# Patient Record
Sex: MALE | Race: WHITE | Employment: OTHER | URBAN - METROPOLITAN AREA
[De-identification: names, ages, dates, MRNs, and addresses within clinical notes are randomized per-mention and may not be internally consistent; named-entity substitution may affect disease eponyms.]

---

## 2017-03-09 ENCOUNTER — APPOINTMENT (OUTPATIENT)
Dept: GENERAL RADIOLOGY | Age: 77
End: 2017-03-09
Attending: EMERGENCY MEDICINE
Payer: MEDICARE

## 2017-03-09 ENCOUNTER — HOSPITAL ENCOUNTER (EMERGENCY)
Age: 77
Discharge: HOME | End: 2017-03-09
Attending: EMERGENCY MEDICINE
Payer: MEDICARE

## 2017-03-09 VITALS
DIASTOLIC BLOOD PRESSURE: 86 MMHG | TEMPERATURE: 98.2 F | HEIGHT: 67 IN | BODY MASS INDEX: 32.08 KG/M2 | SYSTOLIC BLOOD PRESSURE: 156 MMHG | HEART RATE: 68 BPM | OXYGEN SATURATION: 94 % | WEIGHT: 204.4 LBS | RESPIRATION RATE: 20 BRPM

## 2017-03-09 DIAGNOSIS — J98.01 ACUTE BRONCHOSPASM: ICD-10-CM

## 2017-03-09 DIAGNOSIS — R06.6 SINGULTUS: ICD-10-CM

## 2017-03-09 DIAGNOSIS — J10.1 INFLUENZA A: Primary | ICD-10-CM

## 2017-03-09 LAB
ALBUMIN SERPL BCP-MCNC: 3.9 G/DL (ref 3.5–5)
ALBUMIN/GLOB SERPL: 1.3 {RATIO} (ref 1.1–2.2)
ALP SERPL-CCNC: 72 U/L (ref 45–117)
ALT SERPL-CCNC: 43 U/L (ref 12–78)
ANION GAP BLD CALC-SCNC: 5 MMOL/L (ref 5–15)
AST SERPL W P-5'-P-CCNC: 29 U/L (ref 15–37)
BASOPHILS # BLD AUTO: 0 K/UL (ref 0–0.1)
BASOPHILS # BLD: 0 % (ref 0–1)
BILIRUB DIRECT SERPL-MCNC: 0.1 MG/DL (ref 0–0.2)
BILIRUB SERPL-MCNC: 0.4 MG/DL (ref 0.2–1)
BNP SERPL-MCNC: 52 PG/ML (ref 0–100)
BUN SERPL-MCNC: 19 MG/DL (ref 6–20)
BUN/CREAT SERPL: 18 (ref 12–20)
CALCIUM SERPL-MCNC: 8.4 MG/DL (ref 8.5–10.1)
CHLORIDE SERPL-SCNC: 105 MMOL/L (ref 97–108)
CO2 SERPL-SCNC: 27 MMOL/L (ref 21–32)
CREAT SERPL-MCNC: 1.03 MG/DL (ref 0.7–1.3)
EOSINOPHIL # BLD: 0.2 K/UL (ref 0–0.4)
EOSINOPHIL NFR BLD: 3 % (ref 0–7)
ERYTHROCYTE [DISTWIDTH] IN BLOOD BY AUTOMATED COUNT: 13.6 % (ref 11.5–14.5)
FLUAV AG NPH QL IA: POSITIVE
FLUBV AG NOSE QL IA: NEGATIVE
GLOBULIN SER CALC-MCNC: 2.9 G/DL (ref 2–4)
GLUCOSE SERPL-MCNC: 99 MG/DL (ref 65–100)
HCT VFR BLD AUTO: 42.7 % (ref 36.6–50.3)
HGB BLD-MCNC: 14.3 G/DL (ref 12.1–17)
LYMPHOCYTES # BLD AUTO: 24 % (ref 12–49)
LYMPHOCYTES # BLD: 1.3 K/UL (ref 0.8–3.5)
MCH RBC QN AUTO: 30.5 PG (ref 26–34)
MCHC RBC AUTO-ENTMCNC: 33.5 G/DL (ref 30–36.5)
MCV RBC AUTO: 91 FL (ref 80–99)
MONOCYTES # BLD: 0.8 K/UL (ref 0–1)
MONOCYTES NFR BLD AUTO: 16 % (ref 5–13)
NEUTS SEG # BLD: 3.1 K/UL (ref 1.8–8)
NEUTS SEG NFR BLD AUTO: 57 % (ref 32–75)
PLATELET # BLD AUTO: 157 K/UL (ref 150–400)
POTASSIUM SERPL-SCNC: 4 MMOL/L (ref 3.5–5.1)
PROT SERPL-MCNC: 6.8 G/DL (ref 6.4–8.2)
RBC # BLD AUTO: 4.69 M/UL (ref 4.1–5.7)
SODIUM SERPL-SCNC: 137 MMOL/L (ref 136–145)
WBC # BLD AUTO: 5.4 K/UL (ref 4.1–11.1)

## 2017-03-09 PROCEDURE — 96374 THER/PROPH/DIAG INJ IV PUSH: CPT

## 2017-03-09 PROCEDURE — 74011250637 HC RX REV CODE- 250/637: Performed by: EMERGENCY MEDICINE

## 2017-03-09 PROCEDURE — 85025 COMPLETE CBC W/AUTO DIFF WBC: CPT | Performed by: EMERGENCY MEDICINE

## 2017-03-09 PROCEDURE — 74011250636 HC RX REV CODE- 250/636: Performed by: EMERGENCY MEDICINE

## 2017-03-09 PROCEDURE — 36415 COLL VENOUS BLD VENIPUNCTURE: CPT | Performed by: EMERGENCY MEDICINE

## 2017-03-09 PROCEDURE — 74011000250 HC RX REV CODE- 250: Performed by: EMERGENCY MEDICINE

## 2017-03-09 PROCEDURE — 77030029684 HC NEB SM VOL KT MONA -A

## 2017-03-09 PROCEDURE — 80076 HEPATIC FUNCTION PANEL: CPT | Performed by: EMERGENCY MEDICINE

## 2017-03-09 PROCEDURE — 94640 AIRWAY INHALATION TREATMENT: CPT

## 2017-03-09 PROCEDURE — 99284 EMERGENCY DEPT VISIT MOD MDM: CPT

## 2017-03-09 PROCEDURE — 87804 INFLUENZA ASSAY W/OPTIC: CPT | Performed by: EMERGENCY MEDICINE

## 2017-03-09 PROCEDURE — 71020 XR CHEST PA LAT: CPT

## 2017-03-09 PROCEDURE — 93005 ELECTROCARDIOGRAM TRACING: CPT

## 2017-03-09 PROCEDURE — 83880 ASSAY OF NATRIURETIC PEPTIDE: CPT | Performed by: EMERGENCY MEDICINE

## 2017-03-09 PROCEDURE — 80048 BASIC METABOLIC PNL TOTAL CA: CPT | Performed by: EMERGENCY MEDICINE

## 2017-03-09 RX ORDER — ALBUTEROL SULFATE 90 UG/1
2 AEROSOL, METERED RESPIRATORY (INHALATION)
Qty: 1 INHALER | Refills: 0 | Status: ON HOLD | OUTPATIENT
Start: 2017-03-09 | End: 2019-01-02

## 2017-03-09 RX ORDER — METOCLOPRAMIDE HYDROCHLORIDE 5 MG/ML
5 INJECTION INTRAMUSCULAR; INTRAVENOUS
Status: COMPLETED | OUTPATIENT
Start: 2017-03-09 | End: 2017-03-09

## 2017-03-09 RX ORDER — BACLOFEN 10 MG/1
5 TABLET ORAL
Qty: 12 TAB | Refills: 0 | Status: ON HOLD | OUTPATIENT
Start: 2017-03-09 | End: 2019-01-08 | Stop reason: SDUPTHER

## 2017-03-09 RX ORDER — BACLOFEN 10 MG/1
10 TABLET ORAL 3 TIMES DAILY
Status: DISCONTINUED | OUTPATIENT
Start: 2017-03-09 | End: 2017-03-09

## 2017-03-09 RX ORDER — ALBUTEROL SULFATE 90 UG/1
2 AEROSOL, METERED RESPIRATORY (INHALATION)
Status: DISCONTINUED | OUTPATIENT
Start: 2017-03-09 | End: 2017-03-10 | Stop reason: HOSPADM

## 2017-03-09 RX ORDER — IPRATROPIUM BROMIDE AND ALBUTEROL SULFATE 2.5; .5 MG/3ML; MG/3ML
3 SOLUTION RESPIRATORY (INHALATION)
Status: COMPLETED | OUTPATIENT
Start: 2017-03-09 | End: 2017-03-09

## 2017-03-09 RX ORDER — BACLOFEN 10 MG/1
5 TABLET ORAL ONCE
Status: COMPLETED | OUTPATIENT
Start: 2017-03-09 | End: 2017-03-09

## 2017-03-09 RX ADMIN — ALBUTEROL SULFATE 2 PUFF: 90 AEROSOL, METERED RESPIRATORY (INHALATION) at 22:59

## 2017-03-09 RX ADMIN — METOCLOPRAMIDE 5 MG: 5 INJECTION, SOLUTION INTRAMUSCULAR; INTRAVENOUS at 21:32

## 2017-03-09 RX ADMIN — IPRATROPIUM BROMIDE AND ALBUTEROL SULFATE 3 ML: .5; 3 SOLUTION RESPIRATORY (INHALATION) at 21:10

## 2017-03-09 RX ADMIN — BACLOFEN 5 MG: 10 TABLET ORAL at 22:35

## 2017-03-10 LAB
ATRIAL RATE: 58 BPM
CALCULATED P AXIS, ECG09: 26 DEGREES
CALCULATED R AXIS, ECG10: 22 DEGREES
CALCULATED T AXIS, ECG11: 26 DEGREES
DIAGNOSIS, 93000: NORMAL
P-R INTERVAL, ECG05: 176 MS
Q-T INTERVAL, ECG07: 414 MS
QRS DURATION, ECG06: 80 MS
QTC CALCULATION (BEZET), ECG08: 406 MS
VENTRICULAR RATE, ECG03: 58 BPM

## 2017-03-10 NOTE — ED TRIAGE NOTES
Patient arrives to ED with c/o hiccups for 3 day, no nausea or vomiting noted. No other complaint noted.

## 2017-03-10 NOTE — ED PROVIDER NOTES
HPI Comments: 67 y.o. male with past medical history significant for dementia, sick sinus syndrome, pacemaker, HTN, hypothyroidism, and pituitary tumor who presents from home with chief complaint of hiccups. Daughters at bedside reports that the pt has been having the hiccups for 3 days without resolution. Pt has also had a cough for 2 weeks, congestion, and a chornic intermittent wheeze that worsenes with weight gain. Pt has a pacemaker in place due to SSS. Blood work taken at his PCP's office last September which were normal except for low thyroid levels and his synthroid dosage was increased. Kidney functions were normal.  Daughters deny fever. Daughters denies hx of CHF and childhood asthma. There are no other acute medical concerns at this time. Social hx:   Significant FMHx: unknown  PCP: Heidi Martínez MD    Full history, physical exam, and ROS unable to be obtained due to:  dementia. Note written by Brian Cordero, as dictated by Ishan Bright MD 8:39 PM      The history is provided by the patient. No  was used. Past Medical History:   Diagnosis Date    Dementia     Hypertension     Hypothyroidism     Pacemaker     Pituitary tumor     Sick sinus syndrome [facility])        Past Surgical History:   Procedure Laterality Date    HX PACEMAKER           History reviewed. No pertinent family history. Social History     Social History    Marital status:      Spouse name: N/A    Number of children: N/A    Years of education: N/A     Occupational History    Not on file. Social History Main Topics    Smoking status: Never Smoker    Smokeless tobacco: Not on file    Alcohol use Yes    Drug use: Not on file    Sexual activity: Not on file     Other Topics Concern    Not on file     Social History Narrative         ALLERGIES: Review of patient's allergies indicates no known allergies.     Review of Systems   Unable to perform ROS: Dementia Vitals:    03/09/17 2014   BP: 162/77   Pulse: (!) 59   Resp: 16   Temp: 98.1 °F (36.7 °C)   SpO2: 93%   Weight: 92.7 kg (204 lb 6.4 oz)   Height: 5' 7\" (1.702 m)            Physical Exam   Constitutional: He appears well-developed and well-nourished. No distress. HENT:   Head: Normocephalic and atraumatic. Eyes: Conjunctivae and EOM are normal. Pupils are equal, round, and reactive to light. Neck: Normal range of motion. Neck supple. Cardiovascular: Normal rate, regular rhythm, normal heart sounds and intact distal pulses. Exam reveals no friction rub. No murmur heard. Pulmonary/Chest: Effort normal. No respiratory distress. He has wheezes. He has no rales. He exhibits no tenderness. Abdominal: Soft. Bowel sounds are normal. He exhibits no distension. There is no tenderness. There is no rebound and no guarding. Musculoskeletal: Normal range of motion. Trace pitting edema b/l   Neurological: He is alert. Coordination normal.   Skin: Skin is warm and dry. He is not diaphoretic. No pallor. Psychiatric: He has a normal mood and affect. His behavior is normal.   Nursing note and vitals reviewed. MDM  Number of Diagnoses or Management Options  Acute bronchospasm:   Influenza A:   Singultus:   Diagnosis management comments: Possible causes of singultus diaphragm irration, gi causes, asthma as well given wheezing could be cause. Xray to look for edema given wheezing could be cardiac vs pulm and check creatinine. Daughter concern for flu so will check. As for meds for hiccups on ssri and antipsychotic and cant do reglan or chlorpromazine regularly .  Can try baclofen and inhaler       Amount and/or Complexity of Data Reviewed  Clinical lab tests: ordered and reviewed  Tests in the radiology section of CPT®: ordered and reviewed  Obtain history from someone other than the patient: yes (family)  Independent visualization of images, tracings, or specimens: yes (ekg)    Patient Progress  Patient progress: stable    ED Course       Procedures      EKG interpretation: (Preliminary)  Rhythm: sinus bradycardia; and regular . Rate (approx.): 55; Axis: normal; P wave: normal; QRS interval: normal ; ST/T wave: non-specific changes no prolong qt  9:31 PM  Listened to the pt and he is no longer wheezing after the neb tx. Pt sounds much better. 10:38 PM  Spoke with pt and family. Inhaler and try baclofen for hiccups at home given other meds interact with his seroquel. Family does not want tamiflu for flu.  Will dc home follow up with dr Robel Olvera and return if worsening sx

## 2017-03-10 NOTE — CALL BACK NOTE
[facility] Emergency Department Follow Up Call Record    Discharged to : Home/Family Home/Home Health/Skilled Facility/Rehab/Assisted Living/Other__home_____  1) Did you receive your discharge instructions? Yes        2) Do you understand them? Yes    Spoke with caregiver who reports Abelardo Torres still has hiccups at this time . \" He has taken two doses of baclofen or far and is taking the inhaler\", as reported by the caregiver. 3) Are you able to follow them? Yes          If NO, what can I clarify for you? 4) Do you understand your diagnosis? Yes         5) Do you know which symptoms should prompt you to call the doctor? Yes     6) Were you able to fill and  any medications that were prescribed? Yes     7) You were prescribed _baclofen, albuterol inhaler__________for __wheezing, hiccups__________________. Common side effects of this medication are___rash, headache_________________. This is not a complete list so please review the forms given from the pharmacy for a complete list.      8) Are there any questions about your medications? No            Have you scheduled any recommended doctors appointments (specialty, PCP) No. Will follow up with Dr. Poncho Triplett if not improving. If NO, what barriers are you encountering (transportation/lost contact info/cost/  didnt think necessary/no PCP  9) If discharged with Home Health, has the agency contacted you to schedule visit? No   This patient does have cargivers at home. 10) Is there anyone available to help you at home (meals, errands, transportation    monitoring) (adult children, neighbors, private duty companions) Yes    11) Are you on a special diet? No         If YES, do you understand the requirements for this diet? Education provided? 12) If presented with cough, bronchitis, COPD, asthma, is it ok to ask that the   respiratory disease management educator call you?  Not applicable      13)  A) If presented with fall, were you issued an assistive device in the ED    Are you using? Not applicable  B) If given RX for device, have you obtained? Not applicable       If NO, barriers? C) Therapist recommended:NO   Are you able to implement the suggestions? Not applicable        If NO, barriers to implementation? D) Are you having any difficulties with mobility inside your home?     (steps, bed, tub)Not applicable   If YES, ask if the [facility] PT can contact patient and good time and number?  14)  At the end of your discharge instructions, there is information about accessing [facility] SERVICES, have you had a chance to review those? No         Do you have any questions about signing up for this service? We encourage our patients to be active participants in their healthcare and this site is one of the ways to do that. It will allow you to access parts of your medical record, email your doctors office, schedule appointments, and request medications refills . 15) Are there any other questions that I can answer for you regarding    your Emergency department visit?  NO             Estimated Call Time:_____3:44 PM  ______________ Date/Time:_______________

## 2017-03-10 NOTE — DISCHARGE INSTRUCTIONS
We hope that we have addressed all of your medical concerns. The examination and treatment you received in the Emergency Department were for an emergent problem and were not intended as complete care. It is important that you follow up with your healthcare provider(s) for ongoing care. If your symptoms worsen or do not improve as expected, and you are unable to reach your usual health care provider(s), you should return to the Emergency Department. Today's healthcare is undergoing tremendous change, and patient satisfaction surveys are one of the many tools to assess the quality of medical care. You may receive a survey from the [employer] regarding your experience in the Emergency Department. I hope that your experience has been completely positive, particularly the medical care that I provided. As such, please participate in the survey; anything less than excellent does not meet my expectations or intentions. Thank you for allowing us to provide you with medical care today. We realize that you have many choices for your emergency care needs. Please choose us in the future for any continued health care needs.       Fan Love MD    [facility], [facility].   Office: [phone]            Recent Results (from the past 24 hour(s))   EKG, 12 LEAD, INITIAL    Collection Time: 03/09/17  8:44 PM   Result Value Ref Range    Ventricular Rate 58 BPM    Atrial Rate 58 BPM    P-R Interval 176 ms    QRS Duration 80 ms    Q-T Interval 414 ms    QTC Calculation (Bezet) 406 ms    Calculated P Axis 26 degrees    Calculated R Axis 22 degrees    Calculated T Axis 26 degrees    Diagnosis Sinus bradycardia  No previous ECGs available      CBC WITH AUTOMATED DIFF    Collection Time: 03/09/17  8:48 PM   Result Value Ref Range    WBC 5.4 4.1 - 11.1 K/uL    RBC 4.69 4.10 - 5.70 M/uL    HGB 14.3 12.1 - 17.0 g/dL    HCT 42.7 36.6 - 50.3 %    MCV 91.0 80.0 - 99.0 FL    MCH 30.5 26.0 - 34.0 PG    MCHC 33.5 30.0 - 36.5 g/dL    RDW 13.6 11.5 - 14.5 %    PLATELET 976 362 - 659 K/uL    NEUTROPHILS 57 32 - 75 %    LYMPHOCYTES 24 12 - 49 %    MONOCYTES 16 (H) 5 - 13 %    EOSINOPHILS 3 0 - 7 %    BASOPHILS 0 0 - 1 %    ABS. NEUTROPHILS 3.1 1.8 - 8.0 K/UL    ABS. LYMPHOCYTES 1.3 0.8 - 3.5 K/UL    ABS. MONOCYTES 0.8 0.0 - 1.0 K/UL    ABS. EOSINOPHILS 0.2 0.0 - 0.4 K/UL    ABS. BASOPHILS 0.0 0.0 - 0.1 K/UL   METABOLIC PANEL, BASIC    Collection Time: 03/09/17  8:48 PM   Result Value Ref Range    Sodium 137 136 - 145 mmol/L    Potassium 4.0 3.5 - 5.1 mmol/L    Chloride 105 97 - 108 mmol/L    CO2 27 21 - 32 mmol/L    Anion gap 5 5 - 15 mmol/L    Glucose 99 65 - 100 mg/dL    BUN 19 6 - 20 MG/DL    Creatinine 1.03 0.70 - 1.30 MG/DL    BUN/Creatinine ratio 18 12 - 20      GFR est AA >60 >60 ml/min/1.73m2    GFR est non-AA >60 >60 ml/min/1.73m2    Calcium 8.4 (L) 8.5 - 10.1 MG/DL   BNP    Collection Time: 03/09/17  8:48 PM   Result Value Ref Range    BNP 52 0 - 100 pg/mL   INFLUENZA A & B AG (RAPID TEST)    Collection Time: 03/09/17  8:48 PM   Result Value Ref Range    Influenza A Antigen POSITIVE (A) NEG      Influenza B Antigen NEGATIVE  NEG     HEPATIC FUNCTION PANEL    Collection Time: 03/09/17  8:48 PM   Result Value Ref Range    Protein, total 6.8 6.4 - 8.2 g/dL    Albumin 3.9 3.5 - 5.0 g/dL    Globulin 2.9 2.0 - 4.0 g/dL    A-G Ratio 1.3 1.1 - 2.2      Bilirubin, total 0.4 0.2 - 1.0 MG/DL    Bilirubin, direct 0.1 0.0 - 0.2 MG/DL    Alk. phosphatase 72 45 - 117 U/L    AST (SGOT) 29 15 - 37 U/L    ALT (SGPT) 43 12 - 78 U/L       Xr Chest Pa Lat    Result Date: 3/9/2017  INDICATION: wheezing hiccups hx pacemaker EXAM: CXR 2 Views. COMPARISON: Chest CT 9/26/2015. FINDINGS: Frontal and lateral views of the chest show the lungs are free of acute disease. Heart size is normal. There is no overt pulmonary edema. There is no evident pneumothorax, adenopathy or pleural effusion.  Dual chamber pacemaker is stable. IMPRESSION: No acute disease. No significant interval change. Hiccups: Care Instructions  Your Care Instructions    Hiccups occur when a spasm contracts the diaphragm, a large sheet of muscle that separates the chest cavity from the abdominal cavity. The spasm causes an intake of breath that is suddenly stopped by the closure of the vocal cords (glottis). This closure causes the \"hiccup\" sound. A very full stomach can cause hiccups that go away on their own. A full stomach can be caused by things like eating too much food too quickly or swallowing too much air. Most hiccups go away on their own within a few minutes to a few hours and do not require any treatment. Hiccups that last longer than 48 hours are called persistent hiccups. Hiccups that last longer than a month are called intractable hiccups. Both persistent and intractable hiccups may be a sign of a more serious health problem. Follow-up care is a key part of your treatment and safety. Be sure to make and go to all appointments, and call your doctor if you are having problems. It's also a good idea to know your test results and keep a list of the medicines you take. How can you care for yourself at home? · Try these safe and easy home remedies if your hiccups are making you uncomfortable. ¨ Eat a teaspoon of sugar or honey. ¨ Hold your breath and count slowly to 10. ¨ Quickly drink a glass of cold water. · If your doctor prescribed medicine, take it as directed. Call your doctor if you think you are having a problem with your medicine. To help prevent hiccups  · Take steps to avoid swallowing air:  ¨ Eat slowly. Avoid gulping food or beverages. ¨ Chew your food thoroughly before you swallow. ¨ Avoid drinking through a straw. ¨ Avoid chewing gum or eating hard candy. ¨ Do not smoke or use other tobacco products. ¨ If you wear dentures, check with a dentist to make sure they fit properly.   · Do not eat large meals. · Do not drink alcohol. · Avoid sudden changes in stomach temperature, such as drinking a hot beverage and then a cold beverage. · Avoid emotional stress or excitement. When should you call for help? Call your doctor now or seek immediate medical care if:  · You have trouble swallowing and are unable to swallow food or fluids. · You have hiccups for more than 2 days. · You have new symptoms, such as belly pain, constipation, diarrhea, heartburn, or vomiting. Watch closely for changes in your health, and be sure to contact your doctor if:  · You have trouble swallowing but are able to swallow food and fluids. · Hiccups occur often and get in the way of your activities. · You think medicine may be causing your symptoms. · You do not get better as expected. Where can you learn more? Go to http://shady-cruz.info/. Enter W407 in the search box to learn more about \"Hiccups: Care Instructions. \"  Current as of: May 27, 2016  Content Version: 11.1  © 1740-7226 [employer]. Care instructions adapted under license by [employer] (which disclaims liability or warranty for this information). If you have questions about a medical condition or this instruction, always ask your healthcare professional. [address] any warranty or liability for your use of this information. Influenza (Flu): Care Instructions  Your Care Instructions  Influenza (flu) is an infection in the lungs and breathing passages. It is caused by the influenza virus. There are different strains, or types, of the flu virus from year to year. Unlike the common cold, the flu comes on suddenly and the symptoms, such as a cough, congestion, fever, chills, fatigue, aches, and pains, are more severe. These symptoms may last up to 10 days. Although the flu can make you feel very sick, it usually doesn't cause serious health problems.   Home treatment is usually all you need for flu symptoms. But your doctor may prescribe antiviral medicine to prevent other health problems, such as pneumonia, from developing. Older people and those who have a long-term health condition, such as lung disease, are most at risk for having pneumonia or other health problems. Follow-up care is a key part of your treatment and safety. Be sure to make and go to all appointments, and call your doctor if you are having problems. Its also a good idea to know your test results and keep a list of the medicines you take. How can you care for yourself at home? · Get plenty of rest.  · Drink plenty of fluids, enough so that your urine is light yellow or clear like water. If you have kidney, heart, or liver disease and have to limit fluids, talk with your doctor before you increase the amount of fluids you drink. · Take an over-the-counter pain medicine if needed, such as acetaminophen (Tylenol), ibuprofen (Advil, Motrin), or naproxen (Aleve), to relieve fever, headache, and muscle aches. Read and follow all instructions on the label. No one younger than 20 should take aspirin. It has been linked to Reye syndrome, a serious illness. · Do not smoke. Smoking can make the flu worse. If you need help quitting, talk to your doctor about stop-smoking programs and medicines. These can increase your chances of quitting for good. · Breathe moist air from a hot shower or from a sink filled with hot water to help clear a stuffy nose. · Before you use cough and cold medicines, check the label. These medicines may not be safe for young children or for people with certain health problems. · If the skin around your nose and lips becomes sore, put some petroleum jelly on the area. · To ease coughing:  ¨ Drink fluids to soothe a scratchy throat. ¨ Suck on cough drops or plain hard candy. ¨ Take an over-the-counter cough medicine that contains dextromethorphan to help you get some sleep.  Read and follow all instructions on the label. ¨ Raise your head at night with an extra pillow. This may help you rest if coughing keeps you awake. · Take any prescribed medicine exactly as directed. Call your doctor if you think you are having a problem with your medicine. To avoid spreading the flu  · Wash your hands regularly, and keep your hands away from your face. · Stay home from school, work, and other public places until you are feeling better and your fever has been gone for at least 24 hours. The fever needs to have gone away on its own without the help of medicine. · Ask people living with you to talk to their doctors about preventing the flu. They may get antiviral medicine to keep from getting the flu from you. · To prevent the flu in the future, get a flu vaccine every fall. Encourage people living with you to get the vaccine. · Cover your mouth when you cough or sneeze. When should you call for help? Call 911 anytime you think you may need emergency care. For example, call if:  · You have severe trouble breathing. Call your doctor now or seek immediate medical care if:  · You have new or worse trouble breathing. · You seem to be getting much sicker. · You feel very sleepy or confused. · You have a new or higher fever. · You get a new rash. Watch closely for changes in your health, and be sure to contact your doctor if:  · You begin to get better and then get worse. · You are not getting better after 1 week. Where can you learn more? Go to http://shady-cruz.info/. Enter V086 in the search box to learn more about \"Influenza (Flu): Care Instructions. \"  Current as of: May 23, 2016  Content Version: 11.1  © 7186-0371 [employer]. Care instructions adapted under license by [employer] [facility] (which disclaims liability or warranty for this information).  If you have questions about a medical condition or this instruction, always ask your healthcare professional. [employer] disclaims any warranty or liability for your use of this information. Wheezing or Bronchoconstriction: Care Instructions  Your Care Instructions  Wheezing is a whistling noise made during breathing. It occurs when the small airways, or bronchial tubes, that lead to your lungs swell or contract (spasm) and become narrow. This narrowing is called bronchoconstriction. When your airways constrict, it is hard for air to pass through and this makes it hard for you to breathe. Wheezing and bronchoconstriction can be caused by many problems, including:  · An infection such as the flu or a cold. · Allergies such as hay fever. · Diseases such as asthma or chronic obstructive pulmonary disease. · Smoking. Treatment for your wheezing depends on what is causing the problem. Your wheezing may get better without treatment. But you may need to pay attention to things that cause your wheezing and avoid them. Or you may need medicine to help treat the wheezing and to reduce the swelling or to relieve spasms in your lungs. Follow-up care is a key part of your treatment and safety. Be sure to make and go to all appointments, and call your doctor if you are having problems. It is also a good idea to know your test results and keep a list of the medicines you take. How can you care for yourself at home? · Take your medicine exactly as prescribed. Call your doctor if you think you are having a problem with your medicine. You will get more details on the specific medicine your doctor prescribes. · If your doctor prescribed antibiotics, take them as directed. Do not stop taking them just because you feel better. You need to take the full course of antibiotics. · Breathe moist air from a humidifier, hot shower, or sink filled with hot water. This may help ease your symptoms and make it easier for you to breathe.   · If you have congestion in your nose and throat, drinking plenty of fluids, especially hot fluids, may help relieve your symptoms. If you have kidney, heart, or liver disease and have to limit fluids, talk with your doctor before you increase the amount of fluids you drink. · If you have mucus in your airways, it may help to breathe deeply and cough. · Do not smoke or allow others to smoke around you. Smoking can make your wheezing worse. If you need help quitting, talk to your doctor about stop-smoking programs and medicines. These can increase your chances of quitting for good. · Avoid things that may cause your wheezing. These may include colds, smoke, air pollution, dust, pollen, pets, cockroaches, stress, and cold air. When should you call for help? Call 911 anytime you think you may need emergency care. For example, call if:  · You have severe trouble breathing. · You passed out (lost consciousness). Call your doctor now or seek immediate medical care if:  · You cough up yellow, dark brown, or bloody mucus (sputum). · You have new or worse shortness of breath. · Your wheezing is not getting better or it gets worse after you start taking your medicine. Watch closely for changes in your health, and be sure to contact your doctor if:  · You do not get better as expected. Where can you learn more? Go to http://shady-cruz.info/. Enter 410 9785 in the search box to learn more about \"Wheezing or Bronchoconstriction: Care Instructions. \"  Current as of: May 23, 2016  Content Version: 11.1  © 2801-0689 [employer]. Care instructions adapted under license by [employer] (which disclaims liability or warranty for this information). If you have questions about a medical condition or this instruction, always ask your healthcare professional. [place] [address] any warranty or liability for your use of this information.

## 2017-03-10 NOTE — ED NOTES
MD reviewed discharge instructions with the caregiver. The patient and caregiver verbalized understanding. Patient ambulated out of the emergency department escorted by daughters. Patient continues to have hiccups. Patient is in no apparent distress.

## 2018-10-19 ENCOUNTER — APPOINTMENT (OUTPATIENT)
Dept: GENERAL RADIOLOGY | Age: 78
End: 2018-10-19
Attending: EMERGENCY MEDICINE
Payer: MEDICARE

## 2018-10-19 ENCOUNTER — APPOINTMENT (OUTPATIENT)
Dept: CT IMAGING | Age: 78
End: 2018-10-19
Attending: EMERGENCY MEDICINE
Payer: MEDICARE

## 2018-10-19 ENCOUNTER — HOSPITAL ENCOUNTER (EMERGENCY)
Age: 78
Discharge: HOME | End: 2018-10-19
Attending: EMERGENCY MEDICINE
Payer: MEDICARE

## 2018-10-19 VITALS
TEMPERATURE: 98.1 F | RESPIRATION RATE: 20 BRPM | WEIGHT: 204 LBS | DIASTOLIC BLOOD PRESSURE: 81 MMHG | OXYGEN SATURATION: 92 % | HEIGHT: 67 IN | SYSTOLIC BLOOD PRESSURE: 169 MMHG | BODY MASS INDEX: 32.02 KG/M2 | HEART RATE: 54 BPM

## 2018-10-19 DIAGNOSIS — M54.5 LOW BACK PAIN, UNSPECIFIED BACK PAIN LATERALITY, UNSPECIFIED CHRONICITY, WITH SCIATICA PRESENCE UNSPECIFIED: Primary | ICD-10-CM

## 2018-10-19 LAB
ALBUMIN SERPL-MCNC: 3.8 G/DL (ref 3.5–5)
ALBUMIN/GLOB SERPL: 1.2 {RATIO} (ref 1.1–2.2)
ALP SERPL-CCNC: 101 U/L (ref 45–117)
ALT SERPL-CCNC: 29 U/L (ref 12–78)
ANION GAP SERPL CALC-SCNC: 9 MMOL/L (ref 5–15)
APPEARANCE UR: CLEAR
AST SERPL-CCNC: 19 U/L (ref 15–37)
BACTERIA URNS QL MICRO: NEGATIVE /HPF
BASOPHILS # BLD: 0 K/UL (ref 0–0.1)
BASOPHILS NFR BLD: 0 % (ref 0–1)
BILIRUB SERPL-MCNC: 0.6 MG/DL (ref 0.2–1)
BILIRUB UR QL: NEGATIVE
BUN SERPL-MCNC: 14 MG/DL (ref 6–20)
BUN/CREAT SERPL: 15 (ref 12–20)
CALCIUM SERPL-MCNC: 8.3 MG/DL (ref 8.5–10.1)
CHLORIDE SERPL-SCNC: 106 MMOL/L (ref 97–108)
CO2 SERPL-SCNC: 30 MMOL/L (ref 21–32)
COLOR UR: ABNORMAL
COMMENT, HOLDF: NORMAL
CREAT SERPL-MCNC: 0.94 MG/DL (ref 0.7–1.3)
DIFFERENTIAL METHOD BLD: NORMAL
EOSINOPHIL # BLD: 0.2 K/UL (ref 0–0.4)
EOSINOPHIL NFR BLD: 2 % (ref 0–7)
EPITH CASTS URNS QL MICRO: ABNORMAL /LPF
ERYTHROCYTE [DISTWIDTH] IN BLOOD BY AUTOMATED COUNT: 13.4 % (ref 11.5–14.5)
GLOBULIN SER CALC-MCNC: 3.2 G/DL (ref 2–4)
GLUCOSE SERPL-MCNC: 121 MG/DL (ref 65–100)
GLUCOSE UR STRIP.AUTO-MCNC: NEGATIVE MG/DL
HCT VFR BLD AUTO: 44.3 % (ref 36.6–50.3)
HGB BLD-MCNC: 14.7 G/DL (ref 12.1–17)
HGB UR QL STRIP: NEGATIVE
HYALINE CASTS URNS QL MICRO: ABNORMAL /LPF (ref 0–5)
IMM GRANULOCYTES # BLD: 0 K/UL (ref 0–0.04)
IMM GRANULOCYTES NFR BLD AUTO: 0 % (ref 0–0.5)
KETONES UR QL STRIP.AUTO: NEGATIVE MG/DL
LEUKOCYTE ESTERASE UR QL STRIP.AUTO: NEGATIVE
LIPASE SERPL-CCNC: 232 U/L (ref 73–393)
LYMPHOCYTES # BLD: 1.6 K/UL (ref 0.8–3.5)
LYMPHOCYTES NFR BLD: 19 % (ref 12–49)
MCH RBC QN AUTO: 31.4 PG (ref 26–34)
MCHC RBC AUTO-ENTMCNC: 33.2 G/DL (ref 30–36.5)
MCV RBC AUTO: 94.7 FL (ref 80–99)
MONOCYTES # BLD: 0.7 K/UL (ref 0–1)
MONOCYTES NFR BLD: 9 % (ref 5–13)
MUCOUS THREADS URNS QL MICRO: ABNORMAL /LPF
NEUTS SEG # BLD: 5.8 K/UL (ref 1.8–8)
NEUTS SEG NFR BLD: 70 % (ref 32–75)
NITRITE UR QL STRIP.AUTO: NEGATIVE
NRBC # BLD: 0 K/UL (ref 0–0.01)
NRBC BLD-RTO: 0 PER 100 WBC
PH UR STRIP: 6 [PH] (ref 5–8)
PLATELET # BLD AUTO: 182 K/UL (ref 150–400)
PMV BLD AUTO: 10.8 FL (ref 8.9–12.9)
POTASSIUM SERPL-SCNC: 3.3 MMOL/L (ref 3.5–5.1)
PROT SERPL-MCNC: 7 G/DL (ref 6.4–8.2)
PROT UR STRIP-MCNC: 30 MG/DL
RBC # BLD AUTO: 4.68 M/UL (ref 4.1–5.7)
RBC #/AREA URNS HPF: ABNORMAL /HPF (ref 0–5)
SAMPLES BEING HELD,HOLD: NORMAL
SODIUM SERPL-SCNC: 145 MMOL/L (ref 136–145)
SP GR UR REFRACTOMETRY: >1.03 (ref 1–1.03)
TROPONIN I SERPL-MCNC: <0.05 NG/ML
UR CULT HOLD, URHOLD: NORMAL
UROBILINOGEN UR QL STRIP.AUTO: 1 EU/DL (ref 0.2–1)
WBC # BLD AUTO: 8.4 K/UL (ref 4.1–11.1)
WBC URNS QL MICRO: ABNORMAL /HPF (ref 0–4)

## 2018-10-19 PROCEDURE — 81001 URINALYSIS AUTO W/SCOPE: CPT | Performed by: EMERGENCY MEDICINE

## 2018-10-19 PROCEDURE — 74177 CT ABD & PELVIS W/CONTRAST: CPT

## 2018-10-19 PROCEDURE — 77030011943

## 2018-10-19 PROCEDURE — 84484 ASSAY OF TROPONIN QUANT: CPT | Performed by: EMERGENCY MEDICINE

## 2018-10-19 PROCEDURE — 74011636320 HC RX REV CODE- 636/320: Performed by: EMERGENCY MEDICINE

## 2018-10-19 PROCEDURE — 80053 COMPREHEN METABOLIC PANEL: CPT | Performed by: EMERGENCY MEDICINE

## 2018-10-19 PROCEDURE — 99284 EMERGENCY DEPT VISIT MOD MDM: CPT

## 2018-10-19 PROCEDURE — 93005 ELECTROCARDIOGRAM TRACING: CPT

## 2018-10-19 PROCEDURE — 74011000250 HC RX REV CODE- 250: Performed by: EMERGENCY MEDICINE

## 2018-10-19 PROCEDURE — 74011000258 HC RX REV CODE- 258: Performed by: EMERGENCY MEDICINE

## 2018-10-19 PROCEDURE — 83690 ASSAY OF LIPASE: CPT | Performed by: EMERGENCY MEDICINE

## 2018-10-19 PROCEDURE — 71046 X-RAY EXAM CHEST 2 VIEWS: CPT

## 2018-10-19 PROCEDURE — 85025 COMPLETE CBC W/AUTO DIFF WBC: CPT | Performed by: EMERGENCY MEDICINE

## 2018-10-19 PROCEDURE — 36415 COLL VENOUS BLD VENIPUNCTURE: CPT | Performed by: EMERGENCY MEDICINE

## 2018-10-19 RX ORDER — SODIUM CHLORIDE 0.9 % (FLUSH) 0.9 %
10 SYRINGE (ML) INJECTION
Status: COMPLETED | OUTPATIENT
Start: 2018-10-19 | End: 2018-10-19

## 2018-10-19 RX ORDER — LIDOCAINE HYDROCHLORIDE 20 MG/ML
JELLY TOPICAL
Status: COMPLETED | OUTPATIENT
Start: 2018-10-19 | End: 2018-10-19

## 2018-10-19 RX ADMIN — Medication 10 ML: at 16:16

## 2018-10-19 RX ADMIN — IOPAMIDOL 100 ML: 755 INJECTION, SOLUTION INTRAVENOUS at 16:17

## 2018-10-19 RX ADMIN — LIDOCAINE HYDROCHLORIDE: 20 JELLY TOPICAL at 15:53

## 2018-10-19 RX ADMIN — SODIUM CHLORIDE 100 ML: 900 INJECTION, SOLUTION INTRAVENOUS at 16:16

## 2018-10-19 NOTE — ED PROVIDER NOTES
HPI  
 
  65y M with hx of dementia here with concern for L sided abd pain. Seemed like he was grimacing when walking yesterday. Nursing home staff seemed to localize it to the L flank/abd. Had an xray done that showed ileus vs partial SBO so sent here. No vomiting. Moving bowels ok. No trouble breathing. Nothing makes sx's better or worse. No recent illnesses. No prior abd surgeries. Past Medical History:  
Diagnosis Date  Dementia  Hypertension  Hypothyroidism  Pacemaker  Pituitary tumor  Sick sinus syndrome ([address] Past Surgical History:  
Procedure Laterality Date  HX PACEMAKER History reviewed. No pertinent family history. Social History Socioeconomic History  Marital status:  Spouse name: Not on file  Number of children: Not on file  Years of education: Not on file  Highest education level: Not on file Social Needs  Financial resource strain: Not on file  Food insecurity - worry: Not on file  Food insecurity - inability: Not on file  Transportation needs - medical: Not on file  Transportation needs - non-medical: Not on file Occupational History  Not on file Tobacco Use  Smoking status: Never Smoker  Smokeless tobacco: Never Used Substance and Sexual Activity  Alcohol use: Yes  Drug use: Not on file  Sexual activity: Not on file Other Topics Concern  Not on file Social History Narrative  Not on file ALLERGIES: Exelon [rivastigmine] Review of Systems Review of Systems Constitutional: (-) weight loss. HEENT: (-) stiff neck Eyes: (-) discharge. Respiratory: (-) cough. Cardiovascular: (-) syncope. Gastrointestinal: (-) blood in stool. Genitourinary: (-) hematuria. Musculoskeletal: (-) myalgias. Neurological: (-) seizure. Skin: (-) petechiae Lymph/Immunologic: (-) enlarged lymph nodes All other systems reviewed and are negative. Vitals: 10/19/18 1407 BP: 183/74 Pulse: (!) 53 Resp: 18 Temp: 98.7 °F (37.1 °C) SpO2: 94% Weight: 92.5 kg (204 lb) Height: 5' 7\" (1.702 m) Physical Exam Nursing note and vitals reviewed. Constitutional: oriented to person, place, and time. appears elderly. No distress. Head: Normocephalic and atraumatic. Sclera anicteric Nose: No rhinorrhea Mouth/Throat: Oropharynx is clear and moist. Pharynx normal 
Eyes: Conjunctivae are normal. Pupils are equal, round, and reactive to light. Right eye exhibits no discharge. Left eye exhibits no discharge. Neck: Painless normal range of motion. Neck supple. No LAD. Cardiovascular: Normal rate, regular rhythm, normal heart sounds and intact distal pulses. Exam reveals no gallop and no friction rub. No murmur heard. Pulmonary/Chest:  No respiratory distress. No wheezes. No rales. No rhonchi. No increased work of breathing. No accessory muscle use. Good air exchange throughout. Abdominal: soft, tender in the LLQ, no rebound or guarding. No hepatosplenomegaly. Normal bowel sounds throughout. Back: no tenderness to palpation, no deformities, no CVA tenderness Extremities/Musculoskeletal: Normal range of motion. no tenderness. No edema. Distal extremities are neurovasc intact. Lymphadenopathy:   No adenopathy. Neurological:  Alert and oriented to person, place, and time. Coordination normal. CN 2-12 intact. Motor and sensory function intact. Skin: Skin is warm and dry. No rash noted. No pallor. MDM 65y M here with abd pain. Unclear etiology. Will check labs, ECG, CT abd/pelvis and reeval. 
  
 
Procedures ED EKG interpretation: 
Rhythm: sinus bradycardia; and regular . Rate (approx.): 50; Axis: normal; P wave: normal; QRS interval: normal ; ST/T wave: normal;  This EKG was interpreted by Mariano Whitehead MD,ED Provider.

## 2018-10-19 NOTE — ROUTINE PROCESS
Reviewed discharge instructions with the patient's family who verbalized understanding and denied having any further questions.

## 2018-10-19 NOTE — ED TRIAGE NOTES
Patient arrives from [facility] with daughter who drove patient here. Per daughter, starting with pain yesterday on movement, per grimacing. Had back and abd films today, abd film shows either SBO or ileus.

## 2018-10-19 NOTE — DISCHARGE INSTRUCTIONS
Back Pain: Care Instructions  Your Care Instructions    Back pain has many possible causes. It is often related to problems with muscles and ligaments of the back. It may also be related to problems with the nerves, discs, or bones of the back. Moving, lifting, standing, sitting, or sleeping in an awkward way can strain the back. Sometimes you don't notice the injury until later. Arthritis is another common cause of back pain. Although it may hurt a lot, back pain usually improves on its own within several weeks. Most people recover in 12 weeks or less. Using good home treatment and being careful not to stress your back can help you feel better sooner. Follow-up care is a key part of your treatment and safety. Be sure to make and go to all appointments, and call your doctor if you are having problems. It's also a good idea to know your test results and keep a list of the medicines you take. How can you care for yourself at home? · Sit or lie in positions that are most comfortable and reduce your pain. Try one of these positions when you lie down:  ? Lie on your back with your knees bent and supported by large pillows. ? Lie on the floor with your legs on the seat of a sofa or chair. ? Lie on your side with your knees and hips bent and a pillow between your legs. ? Lie on your stomach if it does not make pain worse. · Do not sit up in bed, and avoid soft couches and twisted positions. Bed rest can help relieve pain at first, but it delays healing. Avoid bed rest after the first day of back pain. · Change positions every 30 minutes. If you must sit for long periods of time, take breaks from sitting. Get up and walk around, or lie in a comfortable position. · Try using a heating pad on a low or medium setting for 15 to 20 minutes every 2 or 3 hours. Try a warm shower in place of one session with the heating pad. · You can also try an ice pack for 10 to 15 minutes every 2 to 3 hours.  Put a thin cloth between the ice pack and your skin. · Take pain medicines exactly as directed. ? If the doctor gave you a prescription medicine for pain, take it as prescribed. ? If you are not taking a prescription pain medicine, ask your doctor if you can take an over-the-counter medicine. · Take short walks several times a day. You can start with 5 to 10 minutes, 3 or 4 times a day, and work up to longer walks. Walk on level surfaces and avoid hills and stairs until your back is better. · Return to work and other activities as soon as you can. Continued rest without activity is usually not good for your back. · To prevent future back pain, do exercises to stretch and strengthen your back and stomach. Learn how to use good posture, safe lifting techniques, and proper body mechanics. When should you call for help? Call your doctor now or seek immediate medical care if:    · You have new or worsening numbness in your legs.     · You have new or worsening weakness in your legs. (This could make it hard to stand up.)     · You lose control of your bladder or bowels.    Watch closely for changes in your health, and be sure to contact your doctor if:    · You have a fever, lose weight, or don't feel well.     · You do not get better as expected. Where can you learn more? Go to http://shady-cruz.info/. Enter V014 in the search box to learn more about \"Back Pain: Care Instructions. \"  Current as of: November 29, 2017  Content Version: 11.8  © 0401-9672 [employer]. Care instructions adapted under license by [employer] (which disclaims liability or warranty for this information). If you have questions about a medical condition or this instruction, always ask your healthcare professional. [place] [address] any warranty or liability for your use of this information.

## 2018-10-20 LAB
ATRIAL RATE: 394 BPM
CALCULATED R AXIS, ECG10: 13 DEGREES
CALCULATED T AXIS, ECG11: 41 DEGREES
DIAGNOSIS, 93000: NORMAL
Q-T INTERVAL, ECG07: 482 MS
QRS DURATION, ECG06: 84 MS
QTC CALCULATION (BEZET), ECG08: 439 MS
VENTRICULAR RATE, ECG03: 50 BPM

## 2018-10-22 ENCOUNTER — HOSPITAL ENCOUNTER (EMERGENCY)
Age: 78
Discharge: HOME | End: 2018-10-22
Attending: EMERGENCY MEDICINE
Payer: MEDICARE

## 2018-10-22 ENCOUNTER — APPOINTMENT (OUTPATIENT)
Dept: GENERAL RADIOLOGY | Age: 78
End: 2018-10-22
Attending: EMERGENCY MEDICINE
Payer: MEDICARE

## 2018-10-22 VITALS
RESPIRATION RATE: 15 BRPM | HEART RATE: 62 BPM | SYSTOLIC BLOOD PRESSURE: 137 MMHG | HEIGHT: 70 IN | BODY MASS INDEX: 26.99 KG/M2 | DIASTOLIC BLOOD PRESSURE: 95 MMHG | OXYGEN SATURATION: 96 % | TEMPERATURE: 101.2 F | WEIGHT: 188.49 LBS

## 2018-10-22 DIAGNOSIS — N39.0 URINARY TRACT INFECTION WITHOUT HEMATURIA, SITE UNSPECIFIED: ICD-10-CM

## 2018-10-22 DIAGNOSIS — R50.9 FEVER, UNSPECIFIED FEVER CAUSE: Primary | ICD-10-CM

## 2018-10-22 LAB
ALBUMIN SERPL-MCNC: 3.5 G/DL (ref 3.5–5)
ALBUMIN/GLOB SERPL: 1.1 {RATIO} (ref 1.1–2.2)
ALP SERPL-CCNC: 90 U/L (ref 45–117)
ALT SERPL-CCNC: 26 U/L (ref 12–78)
ANION GAP SERPL CALC-SCNC: 8 MMOL/L (ref 5–15)
APPEARANCE UR: ABNORMAL
AST SERPL-CCNC: 19 U/L (ref 15–37)
BACTERIA URNS QL MICRO: ABNORMAL /HPF
BASOPHILS # BLD: 0 K/UL (ref 0–0.1)
BASOPHILS NFR BLD: 0 % (ref 0–1)
BILIRUB SERPL-MCNC: 1.1 MG/DL (ref 0.2–1)
BILIRUB UR QL CFM: NEGATIVE
BUN SERPL-MCNC: 17 MG/DL (ref 6–20)
BUN/CREAT SERPL: 15 (ref 12–20)
CALCIUM SERPL-MCNC: 8.5 MG/DL (ref 8.5–10.1)
CHLORIDE SERPL-SCNC: 102 MMOL/L (ref 97–108)
CO2 SERPL-SCNC: 28 MMOL/L (ref 21–32)
COLOR UR: YELLOW
COMMENT, HOLDF: NORMAL
CREAT SERPL-MCNC: 1.1 MG/DL (ref 0.7–1.3)
DIFFERENTIAL METHOD BLD: ABNORMAL
EOSINOPHIL # BLD: 0 K/UL (ref 0–0.4)
EOSINOPHIL NFR BLD: 0 % (ref 0–7)
EPITH CASTS URNS QL MICRO: ABNORMAL /LPF
ERYTHROCYTE [DISTWIDTH] IN BLOOD BY AUTOMATED COUNT: 13.6 % (ref 11.5–14.5)
FLUAV AG NPH QL IA: NEGATIVE
FLUBV AG NOSE QL IA: NEGATIVE
GLOBULIN SER CALC-MCNC: 3.2 G/DL (ref 2–4)
GLUCOSE SERPL-MCNC: 94 MG/DL (ref 65–100)
GLUCOSE UR STRIP.AUTO-MCNC: NEGATIVE MG/DL
HCT VFR BLD AUTO: 42.4 % (ref 36.6–50.3)
HGB BLD-MCNC: 14.2 G/DL (ref 12.1–17)
HGB UR QL STRIP: ABNORMAL
IMM GRANULOCYTES # BLD: 0.1 K/UL (ref 0–0.04)
IMM GRANULOCYTES NFR BLD AUTO: 1 % (ref 0–0.5)
KETONES UR QL STRIP.AUTO: ABNORMAL MG/DL
LACTATE SERPL-SCNC: 1.3 MMOL/L (ref 0.4–2)
LEUKOCYTE ESTERASE UR QL STRIP.AUTO: ABNORMAL
LYMPHOCYTES # BLD: 1.5 K/UL (ref 0.8–3.5)
LYMPHOCYTES NFR BLD: 8 % (ref 12–49)
MCH RBC QN AUTO: 30.7 PG (ref 26–34)
MCHC RBC AUTO-ENTMCNC: 33.5 G/DL (ref 30–36.5)
MCV RBC AUTO: 91.8 FL (ref 80–99)
MONOCYTES # BLD: 1.7 K/UL (ref 0–1)
MONOCYTES NFR BLD: 10 % (ref 5–13)
NEUTS SEG # BLD: 14.6 K/UL (ref 1.8–8)
NEUTS SEG NFR BLD: 81 % (ref 32–75)
NITRITE UR QL STRIP.AUTO: NEGATIVE
NRBC # BLD: 0 K/UL (ref 0–0.01)
NRBC BLD-RTO: 0 PER 100 WBC
PH UR STRIP: 8.5 [PH] (ref 5–8)
PLATELET # BLD AUTO: 183 K/UL (ref 150–400)
PMV BLD AUTO: 10.5 FL (ref 8.9–12.9)
POTASSIUM SERPL-SCNC: 3.3 MMOL/L (ref 3.5–5.1)
PROT SERPL-MCNC: 6.7 G/DL (ref 6.4–8.2)
PROT UR STRIP-MCNC: 100 MG/DL
RBC # BLD AUTO: 4.62 M/UL (ref 4.1–5.7)
RBC #/AREA URNS HPF: ABNORMAL /HPF (ref 0–5)
SAMPLES BEING HELD,HOLD: NORMAL
SODIUM SERPL-SCNC: 138 MMOL/L (ref 136–145)
SP GR UR REFRACTOMETRY: 1.01 (ref 1–1.03)
UROBILINOGEN UR QL STRIP.AUTO: 1 EU/DL (ref 0.2–1)
WBC # BLD AUTO: 18 K/UL (ref 4.1–11.1)
WBC URNS QL MICRO: ABNORMAL /HPF (ref 0–4)

## 2018-10-22 PROCEDURE — 96365 THER/PROPH/DIAG IV INF INIT: CPT

## 2018-10-22 PROCEDURE — 81001 URINALYSIS AUTO W/SCOPE: CPT | Performed by: EMERGENCY MEDICINE

## 2018-10-22 PROCEDURE — 71046 X-RAY EXAM CHEST 2 VIEWS: CPT

## 2018-10-22 PROCEDURE — 74011250637 HC RX REV CODE- 250/637: Performed by: EMERGENCY MEDICINE

## 2018-10-22 PROCEDURE — 85025 COMPLETE CBC W/AUTO DIFF WBC: CPT | Performed by: EMERGENCY MEDICINE

## 2018-10-22 PROCEDURE — 74011250636 HC RX REV CODE- 250/636: Performed by: EMERGENCY MEDICINE

## 2018-10-22 PROCEDURE — 36415 COLL VENOUS BLD VENIPUNCTURE: CPT | Performed by: EMERGENCY MEDICINE

## 2018-10-22 PROCEDURE — 83605 ASSAY OF LACTIC ACID: CPT | Performed by: EMERGENCY MEDICINE

## 2018-10-22 PROCEDURE — 87186 SC STD MICRODIL/AGAR DIL: CPT | Performed by: EMERGENCY MEDICINE

## 2018-10-22 PROCEDURE — 87040 BLOOD CULTURE FOR BACTERIA: CPT | Performed by: EMERGENCY MEDICINE

## 2018-10-22 PROCEDURE — 87077 CULTURE AEROBIC IDENTIFY: CPT | Performed by: EMERGENCY MEDICINE

## 2018-10-22 PROCEDURE — 96361 HYDRATE IV INFUSION ADD-ON: CPT

## 2018-10-22 PROCEDURE — 77030005563 HC CATH URETH INT MMGH -A

## 2018-10-22 PROCEDURE — 74011000258 HC RX REV CODE- 258: Performed by: EMERGENCY MEDICINE

## 2018-10-22 PROCEDURE — 99285 EMERGENCY DEPT VISIT HI MDM: CPT

## 2018-10-22 PROCEDURE — 87086 URINE CULTURE/COLONY COUNT: CPT | Performed by: EMERGENCY MEDICINE

## 2018-10-22 PROCEDURE — 51701 INSERT BLADDER CATHETER: CPT

## 2018-10-22 PROCEDURE — 87804 INFLUENZA ASSAY W/OPTIC: CPT | Performed by: EMERGENCY MEDICINE

## 2018-10-22 PROCEDURE — 80053 COMPREHEN METABOLIC PANEL: CPT | Performed by: EMERGENCY MEDICINE

## 2018-10-22 RX ORDER — ACETAMINOPHEN 500 MG
1000 TABLET ORAL
Status: DISCONTINUED | OUTPATIENT
Start: 2018-10-22 | End: 2018-10-22

## 2018-10-22 RX ORDER — IBUPROFEN 800 MG/1
800 TABLET ORAL
Qty: 20 TAB | Refills: 0 | Status: SHIPPED | OUTPATIENT
Start: 2018-10-22 | End: 2018-10-29

## 2018-10-22 RX ORDER — CEPHALEXIN 250 MG/1
250 CAPSULE ORAL 2 TIMES DAILY
Qty: 14 CAP | Refills: 0 | Status: ON HOLD | OUTPATIENT
Start: 2018-10-22 | End: 2019-01-02

## 2018-10-22 RX ORDER — IBUPROFEN 400 MG/1
800 TABLET ORAL
Status: COMPLETED | OUTPATIENT
Start: 2018-10-22 | End: 2018-10-22

## 2018-10-22 RX ADMIN — CEFTRIAXONE 1 G: 1 INJECTION, POWDER, FOR SOLUTION INTRAMUSCULAR; INTRAVENOUS at 20:33

## 2018-10-22 RX ADMIN — IBUPROFEN 800 MG: 400 TABLET, FILM COATED ORAL at 20:32

## 2018-10-22 RX ADMIN — SODIUM CHLORIDE 1000 ML: 900 INJECTION, SOLUTION INTRAVENOUS at 19:25

## 2018-10-22 NOTE — ED TRIAGE NOTES
Triage: Pt arrives with daughters for c/o pain grimacing with ambulation (unknown origin), fatigue, increased confusion since Thursday. Pt was seen at [facility] on Friday with normal results except chest xray stated atelectasis. Hx dementia.

## 2018-10-22 NOTE — ED NOTES
Bedside and Verbal shift change report given to Dwain Harada. RN (oncoming nurse) by Sarita Thorne RN (offgoing nurse). Report included the following information ED Summary, MAR and Recent Results.

## 2018-10-22 NOTE — ED PROVIDER NOTES
Hx dementia, SSS S/P pacer (currently non-functioning), HTN, pituitary tumor; presents from a memory care unit accompanied by his daughters; he began grimacing with apparent abd vs back pain 4 days ago; he had outpatient plain films which showed an ileus vs SBO; he was seen at the [facility] [place] ED and had an unremarkable CBC, CMP, UA, and abd CT; CXR showed ATX; he developed a fever today and has been more confused, weak, and needed help to ambulate and eat (not his baseline); no cough; no rash; no hx UTI's. No known sick contacts. Fatigue Past Medical History:  
Diagnosis Date  Dementia  Hypertension  Hypothyroidism  Pacemaker  Pituitary tumor  Sick sinus syndrome ([facility] [address] Past Surgical History:  
Procedure Laterality Date  HX PACEMAKER History reviewed. No pertinent family history. Social History Socioeconomic History  Marital status:  Spouse name: Not on file  Number of children: Not on file  Years of education: Not on file  Highest education level: Not on file Social Needs  Financial resource strain: Not on file  Food insecurity - worry: Not on file  Food insecurity - inability: Not on file  Transportation needs - medical: Not on file  Transportation needs - non-medical: Not on file Occupational History  Not on file Tobacco Use  Smoking status: Never Smoker  Smokeless tobacco: Never Used Substance and Sexual Activity  Alcohol use: Yes  Drug use: Not on file  Sexual activity: Not on file Other Topics Concern  Not on file Social History Narrative  Not on file ALLERGIES: Exelon [rivastigmine] Review of Systems Constitutional: Positive for fatigue. All other systems reviewed and are negative. Vitals:  
 10/22/18 1851 10/22/18 1900 10/22/18 1913 BP:  161/87 Pulse:  66 Resp:  15 Temp: (!) 101.5 °F (38.6 °C) SpO2:  (!) 86% 95% Weight:  85.5 kg (188 lb 7.9 oz) Height:  5' 10\" (1.778 m) Physical Exam  
Constitutional: He appears well-developed and well-nourished. Moderate to severe dementia; non-toxic appearance HENT:  
Head: Normocephalic and atraumatic. Eyes: Conjunctivae are normal.  
Neck: Neck supple. No tracheal deviation present. Cardiovascular: Normal rate, regular rhythm, normal heart sounds and intact distal pulses. Exam reveals no gallop and no friction rub. No murmur heard. Pulmonary/Chest: Effort normal and breath sounds normal.  
Abdominal: Soft. Minimal right-sided abd tenderness Musculoskeletal: He exhibits no edema or deformity. Neurological: He is alert. Awake; limited historian; dementia Skin: Skin is warm and dry. Psychiatric: He has a normal mood and affect. Nursing note and vitals reviewed. MDM Procedures Progress Note: 
Results, treatment, and follow up plan have been discussed with daughters. Questions were answered. Maulik Buitrago MD 
 
A/P: fever, AMS, weakness - suspect UTI; reassuring appearance and exam; VSS;  
WBC - 18; chemistries ok; UA with 4+ bacteria, 5-10 WBC's, + LE; Rocephin, IVF's in ED; Keflex for home; lactate normal; daughters prefer for him to go back to the memory unit over hospitalization which I think is reasonable.   Maulik Buitrago MD

## 2018-10-23 NOTE — ED NOTES
Patient's daughter provided with prescriptions and discharge paperwork; reports she would relay information to Memory Care unit. Patient's brief changed and assisted into personal clothing. Assisted to wheelchair and into private vehicle. No distress noted.

## 2018-10-23 NOTE — DISCHARGE INSTRUCTIONS

## 2018-10-26 LAB
BACTERIA SPEC CULT: ABNORMAL
BACTERIA SPEC CULT: ABNORMAL
CC UR VC: ABNORMAL
SERVICE CMNT-IMP: ABNORMAL

## 2018-10-27 LAB
BACTERIA SPEC CULT: NORMAL
SERVICE CMNT-IMP: NORMAL

## 2018-12-30 ENCOUNTER — APPOINTMENT (OUTPATIENT)
Dept: GENERAL RADIOLOGY | Age: 78
DRG: 470 | End: 2018-12-30
Attending: EMERGENCY MEDICINE
Payer: MEDICARE

## 2018-12-30 ENCOUNTER — APPOINTMENT (OUTPATIENT)
Dept: CT IMAGING | Age: 78
DRG: 470 | End: 2018-12-30
Attending: PHYSICIAN ASSISTANT
Payer: MEDICARE

## 2018-12-30 ENCOUNTER — HOSPITAL ENCOUNTER (INPATIENT)
Age: 78
LOS: 9 days | Discharge: NURSING, REHAB OR LONG-TERM CARE | DRG: 470 | End: 2019-01-08
Attending: EMERGENCY MEDICINE | Admitting: INTERNAL MEDICINE
Payer: MEDICARE

## 2018-12-30 DIAGNOSIS — Z87.81 S/P RIGHT HIP FRACTURE: ICD-10-CM

## 2018-12-30 DIAGNOSIS — S72.001S HIP FRACTURE, RIGHT, SEQUELA: Primary | ICD-10-CM

## 2018-12-30 LAB
ABO + RH BLD: NORMAL
ALBUMIN SERPL-MCNC: 3.8 G/DL (ref 3.5–5)
ALBUMIN/GLOB SERPL: 1.3 {RATIO} (ref 1.1–2.2)
ALP SERPL-CCNC: 126 U/L (ref 45–117)
ALT SERPL-CCNC: 32 U/L (ref 12–78)
ANION GAP SERPL CALC-SCNC: 4 MMOL/L (ref 5–15)
APTT PPP: 23.3 SEC (ref 22.1–32)
AST SERPL-CCNC: 25 U/L (ref 15–37)
BASOPHILS # BLD: 0 K/UL (ref 0–0.1)
BASOPHILS NFR BLD: 0 % (ref 0–1)
BILIRUB SERPL-MCNC: 0.5 MG/DL (ref 0.2–1)
BLOOD GROUP ANTIBODIES SERPL: NORMAL
BUN SERPL-MCNC: 16 MG/DL (ref 6–20)
BUN/CREAT SERPL: 16 (ref 12–20)
CALCIUM SERPL-MCNC: 8.1 MG/DL (ref 8.5–10.1)
CHLORIDE SERPL-SCNC: 109 MMOL/L (ref 97–108)
CO2 SERPL-SCNC: 30 MMOL/L (ref 21–32)
COMMENT, HOLDF: NORMAL
CREAT SERPL-MCNC: 1 MG/DL (ref 0.7–1.3)
DIFFERENTIAL METHOD BLD: ABNORMAL
EOSINOPHIL # BLD: 0.1 K/UL (ref 0–0.4)
EOSINOPHIL NFR BLD: 1 % (ref 0–7)
ERYTHROCYTE [DISTWIDTH] IN BLOOD BY AUTOMATED COUNT: 13.4 % (ref 11.5–14.5)
GLOBULIN SER CALC-MCNC: 2.9 G/DL (ref 2–4)
GLUCOSE SERPL-MCNC: 106 MG/DL (ref 65–100)
HCT VFR BLD AUTO: 42.6 % (ref 36.6–50.3)
HGB BLD-MCNC: 14 G/DL (ref 12.1–17)
IMM GRANULOCYTES # BLD: 0 K/UL (ref 0–0.04)
IMM GRANULOCYTES NFR BLD AUTO: 1 % (ref 0–0.5)
INR PPP: 1.1 (ref 0.9–1.1)
LYMPHOCYTES # BLD: 1.2 K/UL (ref 0.8–3.5)
LYMPHOCYTES NFR BLD: 14 % (ref 12–49)
MCH RBC QN AUTO: 30.8 PG (ref 26–34)
MCHC RBC AUTO-ENTMCNC: 32.9 G/DL (ref 30–36.5)
MCV RBC AUTO: 93.8 FL (ref 80–99)
MONOCYTES # BLD: 0.5 K/UL (ref 0–1)
MONOCYTES NFR BLD: 6 % (ref 5–13)
NEUTS SEG # BLD: 6.4 K/UL (ref 1.8–8)
NEUTS SEG NFR BLD: 78 % (ref 32–75)
NRBC # BLD: 0 K/UL (ref 0–0.01)
NRBC BLD-RTO: 0 PER 100 WBC
PLATELET # BLD AUTO: 182 K/UL (ref 150–400)
PMV BLD AUTO: 10.2 FL (ref 8.9–12.9)
POTASSIUM SERPL-SCNC: 3.8 MMOL/L (ref 3.5–5.1)
PROT SERPL-MCNC: 6.7 G/DL (ref 6.4–8.2)
PROTHROMBIN TIME: 10.9 SEC (ref 9–11.1)
RBC # BLD AUTO: 4.54 M/UL (ref 4.1–5.7)
SAMPLES BEING HELD,HOLD: NORMAL
SODIUM SERPL-SCNC: 143 MMOL/L (ref 136–145)
SPECIMEN EXP DATE BLD: NORMAL
THERAPEUTIC RANGE,PTTT: NORMAL SECS (ref 58–77)
WBC # BLD AUTO: 8.1 K/UL (ref 4.1–11.1)

## 2018-12-30 PROCEDURE — 99284 EMERGENCY DEPT VISIT MOD MDM: CPT

## 2018-12-30 PROCEDURE — 73700 CT LOWER EXTREMITY W/O DYE: CPT

## 2018-12-30 PROCEDURE — 71045 X-RAY EXAM CHEST 1 VIEW: CPT

## 2018-12-30 PROCEDURE — 96375 TX/PRO/DX INJ NEW DRUG ADDON: CPT

## 2018-12-30 PROCEDURE — 74011250637 HC RX REV CODE- 250/637: Performed by: INTERNAL MEDICINE

## 2018-12-30 PROCEDURE — 86900 BLOOD TYPING SEROLOGIC ABO: CPT

## 2018-12-30 PROCEDURE — 36415 COLL VENOUS BLD VENIPUNCTURE: CPT

## 2018-12-30 PROCEDURE — 96374 THER/PROPH/DIAG INJ IV PUSH: CPT

## 2018-12-30 PROCEDURE — 74011250636 HC RX REV CODE- 250/636

## 2018-12-30 PROCEDURE — 74011000258 HC RX REV CODE- 258: Performed by: INTERNAL MEDICINE

## 2018-12-30 PROCEDURE — 85610 PROTHROMBIN TIME: CPT

## 2018-12-30 PROCEDURE — 85025 COMPLETE CBC W/AUTO DIFF WBC: CPT

## 2018-12-30 PROCEDURE — 85730 THROMBOPLASTIN TIME PARTIAL: CPT

## 2018-12-30 PROCEDURE — 73502 X-RAY EXAM HIP UNI 2-3 VIEWS: CPT

## 2018-12-30 PROCEDURE — 74011250636 HC RX REV CODE- 250/636: Performed by: INTERNAL MEDICINE

## 2018-12-30 PROCEDURE — 74011250636 HC RX REV CODE- 250/636: Performed by: PHYSICIAN ASSISTANT

## 2018-12-30 PROCEDURE — 74011250636 HC RX REV CODE- 250/636: Performed by: EMERGENCY MEDICINE

## 2018-12-30 PROCEDURE — 80053 COMPREHEN METABOLIC PANEL: CPT

## 2018-12-30 PROCEDURE — 65660000000 HC RM CCU STEPDOWN

## 2018-12-30 RX ORDER — SODIUM CHLORIDE 0.9 % (FLUSH) 0.9 %
5-10 SYRINGE (ML) INJECTION EVERY 8 HOURS
Status: DISCONTINUED | OUTPATIENT
Start: 2018-12-30 | End: 2019-01-08 | Stop reason: HOSPADM

## 2018-12-30 RX ORDER — MEMANTINE HYDROCHLORIDE 10 MG/1
10 TABLET ORAL 2 TIMES DAILY
Status: DISCONTINUED | OUTPATIENT
Start: 2018-12-31 | End: 2019-01-08 | Stop reason: HOSPADM

## 2018-12-30 RX ORDER — ONDANSETRON 4 MG/1
4 TABLET, ORALLY DISINTEGRATING ORAL
Status: DISCONTINUED | OUTPATIENT
Start: 2018-12-30 | End: 2019-01-08 | Stop reason: HOSPADM

## 2018-12-30 RX ORDER — ADHESIVE BANDAGE
30 BANDAGE TOPICAL DAILY PRN
Status: DISCONTINUED | OUTPATIENT
Start: 2018-12-30 | End: 2019-01-08 | Stop reason: HOSPADM

## 2018-12-30 RX ORDER — ONDANSETRON 2 MG/ML
4 INJECTION INTRAMUSCULAR; INTRAVENOUS
Status: COMPLETED | OUTPATIENT
Start: 2018-12-30 | End: 2018-12-30

## 2018-12-30 RX ORDER — PANTOPRAZOLE SODIUM 40 MG/1
40 TABLET, DELAYED RELEASE ORAL
Status: DISCONTINUED | OUTPATIENT
Start: 2018-12-31 | End: 2019-01-05

## 2018-12-30 RX ORDER — HYDROCODONE BITARTRATE AND ACETAMINOPHEN 5; 325 MG/1; MG/1
1 TABLET ORAL
Status: DISCONTINUED | OUTPATIENT
Start: 2018-12-30 | End: 2019-01-08 | Stop reason: HOSPADM

## 2018-12-30 RX ORDER — MORPHINE SULFATE 2 MG/ML
2 INJECTION, SOLUTION INTRAMUSCULAR; INTRAVENOUS
Status: COMPLETED | OUTPATIENT
Start: 2018-12-30 | End: 2018-12-30

## 2018-12-30 RX ORDER — QUETIAPINE FUMARATE 25 MG/1
25 TABLET, FILM COATED ORAL 2 TIMES DAILY
Status: DISCONTINUED | OUTPATIENT
Start: 2018-12-31 | End: 2019-01-08 | Stop reason: HOSPADM

## 2018-12-30 RX ORDER — MORPHINE SULFATE 2 MG/ML
1 INJECTION, SOLUTION INTRAMUSCULAR; INTRAVENOUS
Status: DISCONTINUED | OUTPATIENT
Start: 2018-12-30 | End: 2019-01-08 | Stop reason: HOSPADM

## 2018-12-30 RX ORDER — SIMVASTATIN 40 MG/1
40 TABLET, FILM COATED ORAL
Status: DISCONTINUED | OUTPATIENT
Start: 2018-12-30 | End: 2019-01-05

## 2018-12-30 RX ORDER — MORPHINE SULFATE 4 MG/ML
INJECTION INTRAVENOUS
Status: DISPENSED
Start: 2018-12-30 | End: 2018-12-31

## 2018-12-30 RX ORDER — MORPHINE SULFATE 2 MG/ML
4 INJECTION, SOLUTION INTRAMUSCULAR; INTRAVENOUS
Status: DISCONTINUED | OUTPATIENT
Start: 2018-12-30 | End: 2018-12-30

## 2018-12-30 RX ORDER — ACETAMINOPHEN 325 MG/1
650 TABLET ORAL
Status: DISCONTINUED | OUTPATIENT
Start: 2018-12-30 | End: 2019-01-08 | Stop reason: HOSPADM

## 2018-12-30 RX ORDER — DEXTROSE MONOHYDRATE AND SODIUM CHLORIDE 5; .45 G/100ML; G/100ML
75 INJECTION, SOLUTION INTRAVENOUS CONTINUOUS
Status: DISCONTINUED | OUTPATIENT
Start: 2018-12-30 | End: 2019-01-08 | Stop reason: HOSPADM

## 2018-12-30 RX ORDER — CETIRIZINE HCL 10 MG
10 TABLET ORAL DAILY
Status: DISCONTINUED | OUTPATIENT
Start: 2018-12-31 | End: 2019-01-08 | Stop reason: HOSPADM

## 2018-12-30 RX ORDER — TAMSULOSIN HYDROCHLORIDE 0.4 MG/1
0.4 CAPSULE ORAL DAILY
Status: DISCONTINUED | OUTPATIENT
Start: 2018-12-31 | End: 2019-01-08 | Stop reason: HOSPADM

## 2018-12-30 RX ORDER — TRAMADOL HYDROCHLORIDE 50 MG/1
50 TABLET ORAL
Status: DISCONTINUED | OUTPATIENT
Start: 2018-12-30 | End: 2019-01-08 | Stop reason: HOSPADM

## 2018-12-30 RX ORDER — SERTRALINE HYDROCHLORIDE 50 MG/1
100 TABLET, FILM COATED ORAL DAILY
Status: DISCONTINUED | OUTPATIENT
Start: 2018-12-31 | End: 2019-01-08 | Stop reason: HOSPADM

## 2018-12-30 RX ORDER — SODIUM CHLORIDE 0.9 % (FLUSH) 0.9 %
5-10 SYRINGE (ML) INJECTION AS NEEDED
Status: DISCONTINUED | OUTPATIENT
Start: 2018-12-30 | End: 2019-01-08 | Stop reason: HOSPADM

## 2018-12-30 RX ORDER — MORPHINE SULFATE 4 MG/ML
4 INJECTION, SOLUTION INTRAMUSCULAR; INTRAVENOUS
Status: DISCONTINUED | OUTPATIENT
Start: 2018-12-30 | End: 2018-12-31 | Stop reason: SDUPTHER

## 2018-12-30 RX ORDER — DONEPEZIL HYDROCHLORIDE 10 MG/1
10 TABLET, FILM COATED ORAL DAILY
Status: DISCONTINUED | OUTPATIENT
Start: 2018-12-31 | End: 2019-01-08 | Stop reason: HOSPADM

## 2018-12-30 RX ORDER — ACETAMINOPHEN 325 MG/1
650 TABLET ORAL EVERY 6 HOURS
Status: DISCONTINUED | OUTPATIENT
Start: 2018-12-31 | End: 2019-01-08 | Stop reason: HOSPADM

## 2018-12-30 RX ADMIN — MORPHINE SULFATE 2 MG: 2 INJECTION, SOLUTION INTRAMUSCULAR; INTRAVENOUS at 20:28

## 2018-12-30 RX ADMIN — DEXTROSE MONOHYDRATE AND SODIUM CHLORIDE 75 ML/HR: 5; .45 INJECTION, SOLUTION INTRAVENOUS at 23:28

## 2018-12-30 RX ADMIN — ONDANSETRON 4 MG: 2 INJECTION INTRAMUSCULAR; INTRAVENOUS at 20:28

## 2018-12-30 RX ADMIN — MORPHINE SULFATE 1 MG: 2 INJECTION, SOLUTION INTRAMUSCULAR; INTRAVENOUS at 22:27

## 2018-12-30 RX ADMIN — MORPHINE SULFATE 4 MG: 4 INJECTION, SOLUTION INTRAMUSCULAR; INTRAVENOUS at 21:25

## 2018-12-30 RX ADMIN — SIMVASTATIN 40 MG: 40 TABLET, FILM COATED ORAL at 22:27

## 2018-12-30 NOTE — ED TRIAGE NOTES
Patient arrives via EMS with cc of GLF on right side. Facility reports patient is normally ambulatory and was non-ambulatory after GLF. Patient resides at [address] [facility] [address] Dementia unit. Facility denied LOC. EMS reports BLE are equal in length, but RLE is rotated slightly outward. PVS intact. Patient poor historian due to Dementia baseline. VSS.

## 2018-12-31 ENCOUNTER — APPOINTMENT (OUTPATIENT)
Dept: GENERAL RADIOLOGY | Age: 78
DRG: 470 | End: 2018-12-31
Attending: ORTHOPAEDIC SURGERY
Payer: MEDICARE

## 2018-12-31 ENCOUNTER — ANESTHESIA (OUTPATIENT)
Dept: SURGERY | Age: 78
DRG: 470 | End: 2018-12-31
Payer: MEDICARE

## 2018-12-31 ENCOUNTER — ANESTHESIA EVENT (OUTPATIENT)
Dept: SURGERY | Age: 78
DRG: 470 | End: 2018-12-31
Payer: MEDICARE

## 2018-12-31 LAB
ANION GAP SERPL CALC-SCNC: 5 MMOL/L (ref 5–15)
APPEARANCE UR: CLEAR
BACTERIA URNS QL MICRO: NEGATIVE /HPF
BILIRUB UR QL CFM: NEGATIVE
BUN SERPL-MCNC: 13 MG/DL (ref 6–20)
BUN/CREAT SERPL: 17 (ref 12–20)
CALCIUM SERPL-MCNC: 7.8 MG/DL (ref 8.5–10.1)
CHLORIDE SERPL-SCNC: 108 MMOL/L (ref 97–108)
CO2 SERPL-SCNC: 26 MMOL/L (ref 21–32)
COLOR UR: ABNORMAL
CREAT SERPL-MCNC: 0.77 MG/DL (ref 0.7–1.3)
EPITH CASTS URNS QL MICRO: ABNORMAL /LPF
ERYTHROCYTE [DISTWIDTH] IN BLOOD BY AUTOMATED COUNT: 13.2 % (ref 11.5–14.5)
GLUCOSE SERPL-MCNC: 139 MG/DL (ref 65–100)
GLUCOSE UR STRIP.AUTO-MCNC: NEGATIVE MG/DL
HCT VFR BLD AUTO: 40.8 % (ref 36.6–50.3)
HGB BLD-MCNC: 13.6 G/DL (ref 12.1–17)
HGB UR QL STRIP: NEGATIVE
INR PPP: 1.1 (ref 0.9–1.1)
KETONES UR QL STRIP.AUTO: ABNORMAL MG/DL
LEUKOCYTE ESTERASE UR QL STRIP.AUTO: NEGATIVE
MCH RBC QN AUTO: 31.3 PG (ref 26–34)
MCHC RBC AUTO-ENTMCNC: 33.3 G/DL (ref 30–36.5)
MCV RBC AUTO: 94 FL (ref 80–99)
MUCOUS THREADS URNS QL MICRO: ABNORMAL /LPF
NITRITE UR QL STRIP.AUTO: NEGATIVE
NRBC # BLD: 0 K/UL (ref 0–0.01)
NRBC BLD-RTO: 0 PER 100 WBC
PH UR STRIP: 6 [PH] (ref 5–8)
PLATELET # BLD AUTO: 170 K/UL (ref 150–400)
PMV BLD AUTO: 10.6 FL (ref 8.9–12.9)
POTASSIUM SERPL-SCNC: 3.7 MMOL/L (ref 3.5–5.1)
PROT UR STRIP-MCNC: 30 MG/DL
PROTHROMBIN TIME: 11.3 SEC (ref 9–11.1)
RBC # BLD AUTO: 4.34 M/UL (ref 4.1–5.7)
RBC #/AREA URNS HPF: ABNORMAL /HPF (ref 0–5)
SODIUM SERPL-SCNC: 139 MMOL/L (ref 136–145)
SP GR UR REFRACTOMETRY: 1.03 (ref 1–1.03)
UA: UC IF INDICATED,UAUC: ABNORMAL
UROBILINOGEN UR QL STRIP.AUTO: 1 EU/DL (ref 0.2–1)
WBC # BLD AUTO: 10.5 K/UL (ref 4.1–11.1)
WBC URNS QL MICRO: ABNORMAL /HPF (ref 0–4)

## 2018-12-31 PROCEDURE — 65270000029 HC RM PRIVATE

## 2018-12-31 PROCEDURE — 77030020788: Performed by: ORTHOPAEDIC SURGERY

## 2018-12-31 PROCEDURE — 74011000250 HC RX REV CODE- 250: Performed by: ORTHOPAEDIC SURGERY

## 2018-12-31 PROCEDURE — 77030006784 HC BLD SAW OSC MCRA -B: Performed by: ORTHOPAEDIC SURGERY

## 2018-12-31 PROCEDURE — 36415 COLL VENOUS BLD VENIPUNCTURE: CPT

## 2018-12-31 PROCEDURE — 77030018836 HC SOL IRR NACL ICUM -A: Performed by: ORTHOPAEDIC SURGERY

## 2018-12-31 PROCEDURE — 77030026438 HC STYL ET INTUB CARD -A: Performed by: ANESTHESIOLOGY

## 2018-12-31 PROCEDURE — 73501 X-RAY EXAM HIP UNI 1 VIEW: CPT

## 2018-12-31 PROCEDURE — 77030002933 HC SUT MCRYL J&J -A: Performed by: ORTHOPAEDIC SURGERY

## 2018-12-31 PROCEDURE — 74011250637 HC RX REV CODE- 250/637: Performed by: INTERNAL MEDICINE

## 2018-12-31 PROCEDURE — 77030008467 HC STPLR SKN COVD -B: Performed by: ORTHOPAEDIC SURGERY

## 2018-12-31 PROCEDURE — 74011250636 HC RX REV CODE- 250/636: Performed by: INTERNAL MEDICINE

## 2018-12-31 PROCEDURE — 76210000016 HC OR PH I REC 1 TO 1.5 HR: Performed by: ORTHOPAEDIC SURGERY

## 2018-12-31 PROCEDURE — 77030002916 HC SUT ETHLN J&J -A: Performed by: ORTHOPAEDIC SURGERY

## 2018-12-31 PROCEDURE — 77030034850

## 2018-12-31 PROCEDURE — 77030031139 HC SUT VCRL2 J&J -A: Performed by: ORTHOPAEDIC SURGERY

## 2018-12-31 PROCEDURE — 74011250637 HC RX REV CODE- 250/637: Performed by: PHYSICIAN ASSISTANT

## 2018-12-31 PROCEDURE — 85610 PROTHROMBIN TIME: CPT

## 2018-12-31 PROCEDURE — 74011250636 HC RX REV CODE- 250/636: Performed by: ORTHOPAEDIC SURGERY

## 2018-12-31 PROCEDURE — 81001 URINALYSIS AUTO W/SCOPE: CPT

## 2018-12-31 PROCEDURE — 74011250636 HC RX REV CODE- 250/636

## 2018-12-31 PROCEDURE — 94760 N-INVAS EAR/PLS OXIMETRY 1: CPT

## 2018-12-31 PROCEDURE — 77030012935 HC DRSG AQUACEL BMS -B

## 2018-12-31 PROCEDURE — 76060000034 HC ANESTHESIA 1.5 TO 2 HR: Performed by: ORTHOPAEDIC SURGERY

## 2018-12-31 PROCEDURE — 77030018846 HC SOL IRR STRL H20 ICUM -A: Performed by: ORTHOPAEDIC SURGERY

## 2018-12-31 PROCEDURE — 76010000153 HC OR TIME 1.5 TO 2 HR: Performed by: ORTHOPAEDIC SURGERY

## 2018-12-31 PROCEDURE — 74011000250 HC RX REV CODE- 250

## 2018-12-31 PROCEDURE — 0SRR0JA REPLACEMENT OF RIGHT HIP JOINT, FEMORAL SURFACE WITH SYNTHETIC SUBSTITUTE, UNCEMENTED, OPEN APPROACH: ICD-10-PCS | Performed by: ORTHOPAEDIC SURGERY

## 2018-12-31 PROCEDURE — 77030013079 HC BLNKT BAIR HGGR 3M -A: Performed by: ANESTHESIOLOGY

## 2018-12-31 PROCEDURE — C1776 JOINT DEVICE (IMPLANTABLE): HCPCS | Performed by: ORTHOPAEDIC SURGERY

## 2018-12-31 PROCEDURE — 77030012935 HC DRSG AQUACEL BMS -B: Performed by: ORTHOPAEDIC SURGERY

## 2018-12-31 PROCEDURE — 80048 BASIC METABOLIC PNL TOTAL CA: CPT

## 2018-12-31 PROCEDURE — 77030008684 HC TU ET CUF COVD -B: Performed by: ANESTHESIOLOGY

## 2018-12-31 PROCEDURE — 85027 COMPLETE CBC AUTOMATED: CPT

## 2018-12-31 PROCEDURE — 77030011640 HC PAD GRND REM COVD -A: Performed by: ORTHOPAEDIC SURGERY

## 2018-12-31 DEVICE — HIP PRI POR BIPLR STD HD ECHO -- H4: Type: IMPLANTABLE DEVICE | Site: HIP | Status: FUNCTIONAL

## 2018-12-31 DEVICE — IMPLANTABLE DEVICE: Type: IMPLANTABLE DEVICE | Site: HIP | Status: FUNCTIONAL

## 2018-12-31 DEVICE — HEAD FEM DIA28MM STD OFFSET HIP CO CHROM TYP 1 PRI MOD 2: Type: IMPLANTABLE DEVICE | Site: HIP | Status: FUNCTIONAL

## 2018-12-31 RX ORDER — GLYCOPYRROLATE 0.2 MG/ML
INJECTION INTRAMUSCULAR; INTRAVENOUS AS NEEDED
Status: DISCONTINUED | OUTPATIENT
Start: 2018-12-31 | End: 2018-12-31 | Stop reason: HOSPADM

## 2018-12-31 RX ORDER — FENTANYL CITRATE 50 UG/ML
INJECTION, SOLUTION INTRAMUSCULAR; INTRAVENOUS AS NEEDED
Status: DISCONTINUED | OUTPATIENT
Start: 2018-12-31 | End: 2018-12-31 | Stop reason: HOSPADM

## 2018-12-31 RX ORDER — PROPOFOL 10 MG/ML
INJECTION, EMULSION INTRAVENOUS AS NEEDED
Status: DISCONTINUED | OUTPATIENT
Start: 2018-12-31 | End: 2018-12-31 | Stop reason: HOSPADM

## 2018-12-31 RX ORDER — CEFAZOLIN SODIUM/WATER 2 G/20 ML
2 SYRINGE (ML) INTRAVENOUS EVERY 8 HOURS
Status: COMPLETED | OUTPATIENT
Start: 2018-12-31 | End: 2019-01-01

## 2018-12-31 RX ORDER — SODIUM CHLORIDE 0.9 % (FLUSH) 0.9 %
5-10 SYRINGE (ML) INJECTION EVERY 8 HOURS
Status: DISCONTINUED | OUTPATIENT
Start: 2018-12-31 | End: 2019-01-08 | Stop reason: HOSPADM

## 2018-12-31 RX ORDER — SODIUM CHLORIDE 9 MG/ML
125 INJECTION, SOLUTION INTRAVENOUS CONTINUOUS
Status: DISPENSED | OUTPATIENT
Start: 2018-12-31 | End: 2019-01-01

## 2018-12-31 RX ORDER — FERROUS SULFATE, DRIED 160(50) MG
1 TABLET, EXTENDED RELEASE ORAL
Status: DISCONTINUED | OUTPATIENT
Start: 2019-01-01 | End: 2019-01-08 | Stop reason: HOSPADM

## 2018-12-31 RX ORDER — POLYETHYLENE GLYCOL 3350 17 G/17G
17 POWDER, FOR SOLUTION ORAL DAILY
Status: DISCONTINUED | OUTPATIENT
Start: 2018-12-31 | End: 2019-01-08 | Stop reason: HOSPADM

## 2018-12-31 RX ORDER — HYDROMORPHONE HYDROCHLORIDE 2 MG/ML
INJECTION, SOLUTION INTRAMUSCULAR; INTRAVENOUS; SUBCUTANEOUS AS NEEDED
Status: DISCONTINUED | OUTPATIENT
Start: 2018-12-31 | End: 2018-12-31 | Stop reason: HOSPADM

## 2018-12-31 RX ORDER — SUCCINYLCHOLINE CHLORIDE 20 MG/ML
INJECTION INTRAMUSCULAR; INTRAVENOUS AS NEEDED
Status: DISCONTINUED | OUTPATIENT
Start: 2018-12-31 | End: 2018-12-31 | Stop reason: HOSPADM

## 2018-12-31 RX ORDER — FACIAL-BODY WIPES
10 EACH TOPICAL DAILY PRN
Status: DISCONTINUED | OUTPATIENT
Start: 2019-01-02 | End: 2019-01-08 | Stop reason: HOSPADM

## 2018-12-31 RX ORDER — CEFAZOLIN SODIUM 1 G/3ML
INJECTION, POWDER, FOR SOLUTION INTRAMUSCULAR; INTRAVENOUS AS NEEDED
Status: DISCONTINUED | OUTPATIENT
Start: 2018-12-31 | End: 2018-12-31 | Stop reason: HOSPADM

## 2018-12-31 RX ORDER — ACETAMINOPHEN 10 MG/ML
INJECTION, SOLUTION INTRAVENOUS AS NEEDED
Status: DISCONTINUED | OUTPATIENT
Start: 2018-12-31 | End: 2018-12-31 | Stop reason: HOSPADM

## 2018-12-31 RX ORDER — ROCURONIUM BROMIDE 10 MG/ML
INJECTION, SOLUTION INTRAVENOUS AS NEEDED
Status: DISCONTINUED | OUTPATIENT
Start: 2018-12-31 | End: 2018-12-31 | Stop reason: HOSPADM

## 2018-12-31 RX ORDER — CEFAZOLIN SODIUM/WATER 2 G/20 ML
2 SYRINGE (ML) INTRAVENOUS ONCE
Status: DISCONTINUED | OUTPATIENT
Start: 2018-12-31 | End: 2018-12-31 | Stop reason: HOSPADM

## 2018-12-31 RX ORDER — LEVOTHYROXINE SODIUM 150 UG/1
150 TABLET ORAL
Status: DISCONTINUED | OUTPATIENT
Start: 2019-01-01 | End: 2019-01-08 | Stop reason: HOSPADM

## 2018-12-31 RX ORDER — SODIUM CHLORIDE, SODIUM LACTATE, POTASSIUM CHLORIDE, CALCIUM CHLORIDE 600; 310; 30; 20 MG/100ML; MG/100ML; MG/100ML; MG/100ML
INJECTION, SOLUTION INTRAVENOUS
Status: DISCONTINUED | OUTPATIENT
Start: 2018-12-31 | End: 2018-12-31 | Stop reason: HOSPADM

## 2018-12-31 RX ORDER — AMOXICILLIN 250 MG
1 CAPSULE ORAL 2 TIMES DAILY
Status: DISCONTINUED | OUTPATIENT
Start: 2018-12-31 | End: 2019-01-08 | Stop reason: HOSPADM

## 2018-12-31 RX ORDER — EPHEDRINE SULFATE 50 MG/ML
INJECTION, SOLUTION INTRAVENOUS AS NEEDED
Status: DISCONTINUED | OUTPATIENT
Start: 2018-12-31 | End: 2018-12-31 | Stop reason: HOSPADM

## 2018-12-31 RX ORDER — ONDANSETRON 2 MG/ML
INJECTION INTRAMUSCULAR; INTRAVENOUS AS NEEDED
Status: DISCONTINUED | OUTPATIENT
Start: 2018-12-31 | End: 2018-12-31 | Stop reason: HOSPADM

## 2018-12-31 RX ORDER — ENOXAPARIN SODIUM 100 MG/ML
40 INJECTION SUBCUTANEOUS DAILY
Status: DISCONTINUED | OUTPATIENT
Start: 2018-12-31 | End: 2019-01-08 | Stop reason: HOSPADM

## 2018-12-31 RX ORDER — DEXAMETHASONE SODIUM PHOSPHATE 4 MG/ML
INJECTION, SOLUTION INTRA-ARTICULAR; INTRALESIONAL; INTRAMUSCULAR; INTRAVENOUS; SOFT TISSUE AS NEEDED
Status: DISCONTINUED | OUTPATIENT
Start: 2018-12-31 | End: 2018-12-31 | Stop reason: HOSPADM

## 2018-12-31 RX ORDER — NALOXONE HYDROCHLORIDE 0.4 MG/ML
0.4 INJECTION, SOLUTION INTRAMUSCULAR; INTRAVENOUS; SUBCUTANEOUS AS NEEDED
Status: DISCONTINUED | OUTPATIENT
Start: 2018-12-31 | End: 2019-01-08 | Stop reason: HOSPADM

## 2018-12-31 RX ORDER — LIDOCAINE HYDROCHLORIDE 20 MG/ML
INJECTION, SOLUTION EPIDURAL; INFILTRATION; INTRACAUDAL; PERINEURAL AS NEEDED
Status: DISCONTINUED | OUTPATIENT
Start: 2018-12-31 | End: 2018-12-31 | Stop reason: HOSPADM

## 2018-12-31 RX ORDER — SODIUM CHLORIDE 0.9 % (FLUSH) 0.9 %
5-10 SYRINGE (ML) INJECTION AS NEEDED
Status: DISCONTINUED | OUTPATIENT
Start: 2018-12-31 | End: 2019-01-08 | Stop reason: HOSPADM

## 2018-12-31 RX ORDER — ONDANSETRON 2 MG/ML
4 INJECTION INTRAMUSCULAR; INTRAVENOUS
Status: ACTIVE | OUTPATIENT
Start: 2018-12-31 | End: 2019-01-01

## 2018-12-31 RX ADMIN — EPHEDRINE SULFATE 5 MG: 50 INJECTION, SOLUTION INTRAVENOUS at 08:05

## 2018-12-31 RX ADMIN — MORPHINE SULFATE 1 MG: 2 INJECTION, SOLUTION INTRAMUSCULAR; INTRAVENOUS at 03:12

## 2018-12-31 RX ADMIN — GLYCOPYRROLATE 0.2 MG: 0.2 INJECTION INTRAMUSCULAR; INTRAVENOUS at 07:58

## 2018-12-31 RX ADMIN — EPHEDRINE SULFATE 5 MG: 50 INJECTION, SOLUTION INTRAVENOUS at 08:47

## 2018-12-31 RX ADMIN — TAMSULOSIN HYDROCHLORIDE 0.4 MG: 0.4 CAPSULE ORAL at 12:53

## 2018-12-31 RX ADMIN — PROPOFOL 50 MG: 10 INJECTION, EMULSION INTRAVENOUS at 08:07

## 2018-12-31 RX ADMIN — DONEPEZIL HYDROCHLORIDE 10 MG: 10 TABLET ORAL at 12:53

## 2018-12-31 RX ADMIN — HYDROMORPHONE HYDROCHLORIDE 0.2 MG: 2 INJECTION, SOLUTION INTRAMUSCULAR; INTRAVENOUS; SUBCUTANEOUS at 09:02

## 2018-12-31 RX ADMIN — SODIUM CHLORIDE 125 ML/HR: 900 INJECTION, SOLUTION INTRAVENOUS at 17:26

## 2018-12-31 RX ADMIN — Medication 5 ML: at 13:36

## 2018-12-31 RX ADMIN — FENTANYL CITRATE 25 MCG: 50 INJECTION, SOLUTION INTRAMUSCULAR; INTRAVENOUS at 07:38

## 2018-12-31 RX ADMIN — SODIUM CHLORIDE, SODIUM LACTATE, POTASSIUM CHLORIDE, CALCIUM CHLORIDE: 600; 310; 30; 20 INJECTION, SOLUTION INTRAVENOUS at 07:35

## 2018-12-31 RX ADMIN — HYDROMORPHONE HYDROCHLORIDE 0.2 MG: 2 INJECTION, SOLUTION INTRAMUSCULAR; INTRAVENOUS; SUBCUTANEOUS at 08:34

## 2018-12-31 RX ADMIN — ROCURONIUM BROMIDE 5 MG: 10 INJECTION, SOLUTION INTRAVENOUS at 07:42

## 2018-12-31 RX ADMIN — EPHEDRINE SULFATE 5 MG: 50 INJECTION, SOLUTION INTRAVENOUS at 08:52

## 2018-12-31 RX ADMIN — SODIUM CHLORIDE 125 ML/HR: 900 INJECTION, SOLUTION INTRAVENOUS at 09:25

## 2018-12-31 RX ADMIN — PANTOPRAZOLE SODIUM 40 MG: 40 TABLET, DELAYED RELEASE ORAL at 12:53

## 2018-12-31 RX ADMIN — MEMANTINE 10 MG: 10 TABLET ORAL at 17:17

## 2018-12-31 RX ADMIN — SIMVASTATIN 40 MG: 40 TABLET, FILM COATED ORAL at 22:47

## 2018-12-31 RX ADMIN — PROPOFOL 150 MG: 10 INJECTION, EMULSION INTRAVENOUS at 07:42

## 2018-12-31 RX ADMIN — EPHEDRINE SULFATE 5 MG: 50 INJECTION, SOLUTION INTRAVENOUS at 08:29

## 2018-12-31 RX ADMIN — ACETAMINOPHEN 1000 MG: 10 INJECTION, SOLUTION INTRAVENOUS at 08:26

## 2018-12-31 RX ADMIN — Medication 2 G: at 15:58

## 2018-12-31 RX ADMIN — FENTANYL CITRATE 50 MCG: 50 INJECTION, SOLUTION INTRAMUSCULAR; INTRAVENOUS at 08:07

## 2018-12-31 RX ADMIN — DEXAMETHASONE SODIUM PHOSPHATE 4 MG: 4 INJECTION, SOLUTION INTRA-ARTICULAR; INTRALESIONAL; INTRAMUSCULAR; INTRAVENOUS; SOFT TISSUE at 07:59

## 2018-12-31 RX ADMIN — ENOXAPARIN SODIUM 40 MG: 40 INJECTION SUBCUTANEOUS at 12:54

## 2018-12-31 RX ADMIN — QUETIAPINE FUMARATE 25 MG: 25 TABLET ORAL at 17:17

## 2018-12-31 RX ADMIN — ACETAMINOPHEN 650 MG: 325 TABLET ORAL at 12:53

## 2018-12-31 RX ADMIN — FENTANYL CITRATE 50 MCG: 50 INJECTION, SOLUTION INTRAMUSCULAR; INTRAVENOUS at 07:58

## 2018-12-31 RX ADMIN — ACETAMINOPHEN 650 MG: 325 TABLET ORAL at 17:17

## 2018-12-31 RX ADMIN — POLYETHYLENE GLYCOL 3350 17 G: 17 POWDER, FOR SOLUTION ORAL at 12:52

## 2018-12-31 RX ADMIN — EPHEDRINE SULFATE 5 MG: 50 INJECTION, SOLUTION INTRAVENOUS at 08:56

## 2018-12-31 RX ADMIN — FENTANYL CITRATE 25 MCG: 50 INJECTION, SOLUTION INTRAMUSCULAR; INTRAVENOUS at 07:42

## 2018-12-31 RX ADMIN — CEFAZOLIN SODIUM 2 G: 1 INJECTION, POWDER, FOR SOLUTION INTRAMUSCULAR; INTRAVENOUS at 07:54

## 2018-12-31 RX ADMIN — SUCCINYLCHOLINE CHLORIDE 140 MG: 20 INJECTION INTRAMUSCULAR; INTRAVENOUS at 07:42

## 2018-12-31 RX ADMIN — LIDOCAINE HYDROCHLORIDE 60 MG: 20 INJECTION, SOLUTION EPIDURAL; INFILTRATION; INTRACAUDAL; PERINEURAL at 07:42

## 2018-12-31 RX ADMIN — ONDANSETRON 4 MG: 2 INJECTION INTRAMUSCULAR; INTRAVENOUS at 07:59

## 2018-12-31 RX ADMIN — SERTRALINE HYDROCHLORIDE 100 MG: 50 TABLET ORAL at 12:53

## 2018-12-31 NOTE — PERIOP NOTES
TRANSFER - OUT REPORT: 
 
Verbal report given to Brandon Bender (name) on [address]  being transferred to [address] [place] [address]  (unit) for routine post - op Report consisted of patients Situation, Background, Assessment and  
Recommendations(SBAR). Time Pre op antibiotic CNBWB:[id number] Anesthesia Stop time: 2083 Scott Present on Transfer to floor:YES Order for Scott on Chart:YES Discharge Prescriptions with Chart:NO Information from the following report(s) SBAR, Kardex, OR Summary, Intake/Output, MAR, Med Rec Status and Cardiac Rhythm SR was reviewed with the receiving nurse. Opportunity for questions and clarification was provided. Is the patient on 02? YES 
     L/Min 2L Is the patient on a monitor? YES Is the nurse transporting with the patient? YES Surgical Waiting Area notified of patient's transfer from PACU? YES The following personal items collected during your admission accompanied patient upon transfer:  
Dental Appliance: Dental Appliances: None Vision: Visual Aid: None Hearing Aid:   
Jewelry:   
Clothing:   
Other Valuables:   
Valuables sent to safe:

## 2018-12-31 NOTE — OP NOTES
[address] [place] [address] REPORT    Katt Landers  MR#: [id number]  : 1940  ACCOUNT #: [de-identified]   DATE OF SERVICE: 2018    PREOPERATIVE DIAGNOSIS:  Displaced right femoral neck fracture. POSTOPERATIVE DIAGNOSIS:  Displaced right femoral neck fracture. PROCEDURE PERFORMED:  Right hip hemiarthroplasty. SURGEON:  Jayy Maria MD    ASSISTANT:  Wing Osuna    ANESTHESIA:  General.    ESTIMATED BLOOD LOSS:  Minimal.    SPECIMENS REMOVED:  none    COMPLICATIONS:  None. IMPLANTS:  Biomet size 13 press-fit femoral stem, 49 mm head, standard neck. DISPOSITION:  The patient was taken to the recovery room in stable condition. OPERATIVE INDICATIONS:  The patient is a 74-year-old male who presented to [address] [place] [address] with a right femoral neck fracture. He has severe dementia. I met with his daughter, who works in the [facility] Emergency Department. We discussed options. I recommended a hemiarthroplasty for his hip fracture. She wished to proceed. Informed consent was obtained including all risks and benefits, and they wished to go forward. OPERATIVE PROCEDURE:  The patient was identified in the preoperative holding area. The right lower extremity was marked with the patient. All preoperative questions were answered. He was seen by the Anesthesia Department, taken to the operating room, and transferred to the operating room table in supine position. Once appropriate anesthesia was obtained, he was flipped to a lateral position with the right leg up, with a pegboard used to hold him there. An axillary roll was placed. All bony prominences were padded. The right leg was prepped and draped in the usual sterile fashion. A timeout was conducted indicating the correct operative site and preoperative antibiotics were given prior to incision being made.   Next, an incision was made centered at the greater trochanter of the right femur and dissected down to the fascia. The fascia was split in line with the skin. A Charnley retractor was placed. The hip was internally rotated while electrocautery was used to remove the external rotators. The capsule was opened. There was a fracture of the femoral neck. The femoral head was removed. The acetabulum was cleaned of any debris. Trials were undertaken. A size 49 mm head was selected. The proximal femur was approached with sequential broaching and reaming. A size 13 press-fit femoral stem was placed with good fixation and purchase. Once this was in place, then trial head and neck sizes were attempted. A standard neck with a 49 mm head was selected. The final implants were implanted. The hip was quite stable with the hip flexed to 90 degrees, with internal rotation to about 70 degrees. Once all of this was completed, the whole area was thoroughly irrigated with saline. The capsule followed by external rotators, followed by fascia were all closed with Vicryl suture as well as subcuticular layer, and then skin staples were placed. Sterile dressings were placed. The patient was awoken and taken to recovery room in stable condition.       MD NILDA Baker / MATEO  D: 12/31/2018 10:24     T: 12/31/2018 11:13  JOB #: [id number]

## 2018-12-31 NOTE — PROGRESS NOTES
Consult received and chart reviewed. Patient underwent hemiarthroplasty for femoral neck fracture today. We will follow up with DANIEL morejon tomorrow.  
 
Francisca Romero PT

## 2018-12-31 NOTE — PROGRESS NOTES
Hospitalist Progress Note Indira Ashley MD 
Call  Date of Service:  2018 NAME:  Sam Colón :  1940 MRN:  [id number] Admission Summary: S/p fall and right hip fracture Interval history / Subjective:  
Resting quietly when seen at bedside Assessment & Plan: Hx of fall and right hip fracture s/p right hemiarthroplasty by ortho service 
-no complaints of pain 
-on pain and bowel regimen as guided by ortho service. History of pacemaker placement for sick sinus syndrome without any battery life in the past year. 2018 hospitalist note: \"Patient's cardiologist aware. No dysrhythmias noted. Fairly well controlled primary hypertension. \" Hx of dyslipidemia/hyperlipidemia 
-on statin Hx of hypothyroidism -on replacement History of a pituitary tumor. Probable advanced dementia without any current behavioral disturbances.   
-on aricpet and memantine and other home meds 2018 hospitalist note: \"Directives. DDNR on the record. Discussed with the patient's daughter, power of . \" 
 
DVT prophylaxis: lovenox, as per ortho recs Care Plan discussed with:patient/staff Disposition:TBD Hospital Problems  Never Reviewed Codes Class Noted POA  
 S/P right hip fracture ICD-10-CM: Z87.81 ICD-9-CM: V15.51  2018 Unknown Review of Systems:  
Unable to obtain due to current state, dementia Vital Signs:  
 Last 24hrs VS reviewed since prior progress note. Most recent are: 
Visit Vitals /74 Pulse 84 Temp 98 °F (36.7 °C) Resp 14 SpO2 100% Intake/Output Summary (Last 24 hours) at 2018 1345 Last data filed at 2018 1000 Gross per 24 hour Intake 1420 ml Output 335 ml Net 1085 ml Physical Examination:  
 
 
     
Constitutional:  No acute distress, pleasant   
ENT:  Neck supple,   
 Resp:  No wheezing/rhonchi/rales. No accessory muscle use CV:  Regular rhythm, normal rate, no gallops or rubs GI:  Soft, non distended, non tender. normoactive bowel sounds Musculoskeletal:  No edema, pulses present, no cyanosis Neurologic:  follows command, tracks with eyes, flat affect, alert but not oriented Data Review:  
 
 
 
Labs:  
 
Recent Labs 12/31/18 0254 12/30/18 2022 WBC 10.5 8.1 HGB 13.6 14.0  
HCT 40.8 42.6  182 Recent Labs 12/31/18 0254 12/30/18 2022  143  
K 3.7 3.8  109* CO2 26 30 BUN 13 16 CREA 0.77 1.00 * 106* CA 7.8* 8.1* Recent Labs 12/30/18 2022 SGOT 25 ALT 32 * TBILI 0.5 TP 6.7 ALB 3.8 GLOB 2.9 Recent Labs 12/31/18 0254 12/30/18 2022 INR 1.1 1.1 PTP 11.3* 10.9 APTT  --  23.3 Lab Results Component Value Date/Time Color DARK YELLOW 12/31/2018 06:38 AM  
 Appearance CLEAR 12/31/2018 06:38 AM  
 Specific gravity 1.029 12/31/2018 06:38 AM  
 Specific gravity 1.015 10/22/2018 07:03 PM  
 pH (UA) 6.0 12/31/2018 06:38 AM  
 Protein 30 (A) 12/31/2018 06:38 AM  
 Glucose NEGATIVE  12/31/2018 06:38 AM  
 Ketone TRACE (A) 12/31/2018 06:38 AM  
 Bilirubin NEGATIVE  10/19/2018 04:50 PM  
 Urobilinogen 1.0 12/31/2018 06:38 AM  
 Nitrites NEGATIVE  12/31/2018 06:38 AM  
 Leukocyte Esterase NEGATIVE  12/31/2018 06:38 AM  
 Epithelial cells MODERATE (A) 12/31/2018 06:38 AM  
 Bacteria NEGATIVE  12/31/2018 06:38 AM  
 WBC 0-4 12/31/2018 06:38 AM  
 RBC 0-5 12/31/2018 06:38 AM  
 
 
 
Medications Reviewed:  
 
______________________________________________________________________ EXPECTED LENGTH OF STAY: - - - 
ACTUAL LENGTH OF STAY:          1 Debbie Fagan MD

## 2018-12-31 NOTE — ED PROVIDER NOTES
Grace Dumont is a 77 y.o. male who presents to the ED via EMS from [address] [place] [address]  with a c/o rt hip pain s/p GLF today. Pt's daughter states that he fell down, landing on his right side. Pt was non-ambulatory after GLF. Denies LOC/head injury. Additional hx limited due to pt's dementia. No chest pain, shortness of breath, n/v,d , fever, chills, numbness, tingling, abdominal pain, back pain, cough, leg swelling, dizziness or any other acute sx. Pt denies any recent travel, known sick contacts, or recent illness. PCP: Luis Godoy MD 
PMHx significant for: Dementia, Sick Sinus Syndrome, HTN, Hypothyroidism, Pituitary Tumor PSHx significant for: Pacemaker Social Hx: Tobacco: none EtOH: none Illicit drug use: none There are no further complaints or symptoms at this time. Signed by: Jordan Garcia, scribing for Mirella Varela MD  on December 30th, 2018 at 19:14pm. 
 
 
 
 
  
 
Past Medical History:  
Diagnosis Date  Dementia  Hypertension  Hypothyroidism  Pacemaker  Pituitary tumor  Sick sinus syndrome ([facility] [address] Past Surgical History:  
Procedure Laterality Date  HX PACEMAKER History reviewed. No pertinent family history. Social History Socioeconomic History  Marital status:  Spouse name: Not on file  Number of children: Not on file  Years of education: Not on file  Highest education level: Not on file Social Needs  Financial resource strain: Not on file  Food insecurity - worry: Not on file  Food insecurity - inability: Not on file  Transportation needs - medical: Not on file  Transportation needs - non-medical: Not on file Occupational History  Not on file Tobacco Use  Smoking status: Never Smoker  Smokeless tobacco: Never Used Substance and Sexual Activity  Alcohol use: No  
  Frequency: Never  Drug use: No  
 Sexual activity: Not on file Other Topics Concern  Not on file Social History Narrative  Not on file ALLERGIES: Exelon [rivastigmine] Review of Systems Constitutional: Negative for chills and fever. Respiratory: Negative for cough and shortness of breath. Cardiovascular: Negative for chest pain and leg swelling. Gastrointestinal: Negative for nausea and vomiting. Musculoskeletal: Positive for arthralgias (Positive fpr rt hip pain). Neurological: Negative for syncope and weakness. All other systems reviewed and are negative. Vitals:  
 12/30/18 1833 BP: (!) 154/97 Pulse: (!) 58 Resp: 16 Temp: 98.3 °F (36.8 °C) SpO2: 93% Physical Exam  
Constitutional: He appears well-developed and well-nourished. No distress. HENT:  
Head: Normocephalic and atraumatic. Nose: Nose normal.  
Mouth/Throat: Oropharynx is clear and moist.  
Eyes: Conjunctivae and EOM are normal. Pupils are equal, round, and reactive to light. No scleral icterus. Neck: Normal range of motion. Neck supple. No JVD present. No tracheal deviation present. No thyromegaly present. No carotid bruits noted. Cardiovascular: Normal rate, regular rhythm, normal heart sounds and intact distal pulses. Exam reveals no gallop and no friction rub. No murmur heard. Pulmonary/Chest: Effort normal and breath sounds normal. No respiratory distress. He has no wheezes. He has no rales. He exhibits no tenderness. Abdominal: Soft. Bowel sounds are normal. He exhibits no distension and no mass. There is no tenderness. There is no rebound and no guarding. Musculoskeletal: He exhibits no edema. Right hip: He exhibits decreased range of motion and tenderness. Lymphadenopathy:  
  He has no cervical adenopathy. Neurological: He is alert. He has normal reflexes. No cranial nerve deficit. Coordination normal.  
Skin: Skin is warm and dry. No rash noted. No erythema. Psychiatric: He has a normal mood and affect.  His behavior is normal. Judgment and thought content normal.  
Nursing note and vitals reviewed. Signed by: reji Livingston for Nato Varela MD  on December 30th, 2018 at 19:14pm. 
 
MDM Number of Diagnoses or Management Options Amount and/or Complexity of Data Reviewed Clinical lab tests: ordered and reviewed Tests in the radiology section of CPT®: ordered and reviewed Decide to obtain previous medical records or to obtain history from someone other than the patient: yes Review and summarize past medical records: yes Discuss the patient with other providers: yes Independent visualization of images, tracings, or specimens: yes Risk of Complications, Morbidity, and/or Mortality Presenting problems: high Diagnostic procedures: high Management options: high Patient Progress Patient progress: stable Procedures 7:58 PM 
X-Ray shows Rt Hip Fx, will consult Hospitalist for admission. 7:58 PM 
Patient is being admitted to the hospital.  The results of their tests and reasons for their admission have been discussed with them and/or available family. They convey agreement and understanding for the need to be admitted and for their admission diagnosis. Consultation will be made now with the inpatient physician for hospitalization.

## 2018-12-31 NOTE — ANESTHESIA POSTPROCEDURE EVALUATION
Procedure(s): RIGHT HIP HEMIARTHROPLASTY. <BSHSIANPOST> Visit Vitals /73 (BP 1 Location: Left arm, BP Patient Position: At rest) Pulse 100 Temp 36.8 °C (98.2 °F) Resp 16 SpO2 92%

## 2018-12-31 NOTE — PROGRESS NOTES
NUTRITION COMPLETE ASSESSMENT 
 
RECOMMENDATIONS:  
1. Continue current diet 2. Please document po intake meals /supplements in flow sheets 3. Please obtain measured weight Interventions/Plan:  
Food/Nutrient Delivery:           RD to add supplements Assessment:  
Reason for Assessment:  
[x] Provider Consult Diet: Cardiac Supplements: None Nutritionally Significant Medications: [x] Reviewed & Includes: Os-Peter, Synthroid, Protonix, Miralax, Pericolace, D5 w/NaCl @75mL/hr  
Meal Intake: No data found. Pre-Hospitalization: Unsure Current Hospitalization:  
Fluid Restriction:  none Appetite:  unsure PO Ability:  unsure Average po intake: diet just advanced Average supplements intake:  n/a Subjective: Pt with dementia; poor historian Objective: 
Pt seen for MD consult. Pt admitted with R hip fx from nursing home. PMHx: advanced Alzheimer dementia, primary hypertension, hypothyroidism, history of pacemaker placement for sick sinus syndrome. Noted pt with no current wt in system; used most recent wt in Oct (85.4 kg) to calculate nutrition needs. Pt needs measured weight. Pt diet just advanced today to Cardiac. Will add Ensure BID which provides 700 kcal, 40g protein meeting 38% caloric, 43% protein needs. Will follow po intake, wt status. Estimated Nutrition Needs:  
Kcals/day: 0166 Kcals/day(6095-8157 kcal/day (MSJ x 1.2-1.3)) Protein: 94 g(94-103g/day (1.1-1.2g/kg)) Fluid: 1900 ml(1mL/kcal) Based On: [place] [address] Weight Used: Other (comment)(last wt in EHR, 188#) Pt expected to meet estimated nutrient needs:  []   Yes     []  No [x] Unable to predict at this time Nutrition Diagnosis:  
1. Increased nutrient needs (protein) related to hip fx as evidenced by pt with hip fx Goals: Pt will consume at least 50% meals, supplements over next 5-7 days Monitoring & Evaluation: - Total energy intake, Liquid meal replacement - Weight/weight change  -   
 
 Previous Nutrition Goals Met:   N/A Previous Recommendations:    N/A Education & Discharge Needs: 
 [x] None Identified 
 [] Identified and addressed  
 [] Participated in care plan, discharge planning, and/or interdisciplinary rounds Cultural, Ep[employer] and ethnic food preferences identified:   
 
Skin Integrity: []Intact  [x]Other- surgical incision R hip Edema: [x]None []Other Last BM: Unknown Food Allergies: [x]None []Other Diet Restrictions:   none Anthropometrics:   
Weight Loss Metrics 12/30/2018 10/22/2018 10/19/2018 3/9/2017 9/26/2015 9/23/2015 Today's Wt - 188 lb 7.9 oz 204 lb 204 lb 6.4 oz 205 lb 202 lb BMI 29.52 kg/m2 27.05 kg/m2 31.95 kg/m2 32.01 kg/m2 29.41 kg/m2 28.98 kg/m2 Height: 5' 7\" (170.2 cm)(per EHR note), Body mass index is 29.52 kg/m². ,   
 ,   
 
Labs:   
Lab Results Component Value Date/Time Sodium 139 12/31/2018 02:54 AM  
 Potassium 3.7 12/31/2018 02:54 AM  
 Chloride 108 12/31/2018 02:54 AM  
 CO2 26 12/31/2018 02:54 AM  
 Glucose 139 (H) 12/31/2018 02:54 AM  
 BUN 13 12/31/2018 02:54 AM  
 Creatinine 0.77 12/31/2018 02:54 AM  
 Calcium 7.8 (L) 12/31/2018 02:54 AM  
 Albumin 3.8 12/30/2018 08:22 PM  
 
No results found for: HBA1C, HGBE8, GKW8ZPSX, [id number] [address]

## 2018-12-31 NOTE — ED NOTES
Patient resting comfortably in bed, bed locked & low, call bell within reach. Patient's Daughter at bedside.

## 2018-12-31 NOTE — ED NOTES
Kaity GUEVARA, at bedside updating Patient and Patient's Family on plan of care. 8:56 PM 
Hospitalist MD at bedside for admission assessment. Patient's family at bedside. 9:02 PM 
CT to pick patient up shortly.

## 2018-12-31 NOTE — PROGRESS NOTES
Bedside and Verbal shift change report given to Dorann Alpers, RN (oncoming nurse) by Dg Upton RN (offgoing nurse). Report included the following information SBAR, Kardex, Intake/Output, MAR, Accordion and Recent Results.

## 2018-12-31 NOTE — ROUTINE PROCESS
TRANSFER - OUT REPORT: 
 
Verbal report given to Villa Dias RN (name) on Rocky Upton  being transferred to  (unit) for routine progression of care Report consisted of patients Situation, Background, Assessment and  
Recommendations(SBAR). Information from the following report(s) SBAR, ED Summary, [facility] and Recent Results was reviewed with the receiving nurse. Lines:  
Peripheral IV 12/30/18 Right Antecubital (Active) Site Assessment Clean, dry, & intact 12/30/2018  8:24 PM  
Phlebitis Assessment 0 12/30/2018  8:24 PM  
Infiltration Assessment 0 12/30/2018  8:24 PM  
Dressing Status Clean, dry, & intact 12/30/2018  8:24 PM  
Dressing Type Transparent 12/30/2018  8:24 PM  
Hub Color/Line Status Blue 12/30/2018  8:24 PM  
  
 
Opportunity for questions and clarification was provided. Patient transported with: 
ER Medic, Celia Johnson, on cardiac monitor.

## 2018-12-31 NOTE — ANESTHESIA PREPROCEDURE EVALUATION
Anesthetic History No history of anesthetic complications Review of Systems / Medical History Patient summary reviewed, nursing notes reviewed and pertinent labs reviewed Pulmonary Within defined limits Neuro/Psych Dementia Cardiovascular Hypertension Dysrhythmias Pacemaker GI/Hepatic/Renal 
Within defined limits Endo/Other Hypothyroidism Other Findings Physical Exam 
 
Airway Mallampati: II 
TM Distance: > 6 cm Neck ROM: normal range of motion Mouth opening: Normal 
 
 Cardiovascular Regular rate and rhythm,  S1 and S2 normal,  no murmur, click, rub, or gallop Dental 
No notable dental hx Pulmonary Breath sounds clear to auscultation Abdominal 
GI exam deferred Other Findings Anesthetic Plan ASA: 3 Anesthesia type: general 
 
 
 
 
Induction: Intravenous Anesthetic plan and risks discussed with: Patient

## 2018-12-31 NOTE — PROGRESS NOTES
TRANSFER - IN REPORT: 
 
Verbal report received from [address], RN(name) on [address]  being received from ED(unit) for routine progression of care Report consisted of patients Situation, Background, Assessment and  
Recommendations(SBAR). Information from the following report(s) SBAR, Kardex, Intake/Output, MAR, Accordion and Recent Results was reviewed with the receiving nurse. Opportunity for questions and clarification was provided. Assessment completed upon patients arrival to unit and care assumed.

## 2018-12-31 NOTE — CONSULTS
ORTHOPAEDIC CONSULT NOTE    Subjective:     Date of Consultation:  December 30, 2018  Referring Physician:  Jaime Schuster is a 77 y.o. male with PMH significant for sick sinus syndrome with pacemaker no longer working, HTN, hypothyroidism and dementia who lives in [facility] was witnessed having a GLF at his facility today while walking without assistance. Patient unable to give any information but patient daughter (ER nurse here) is present for history purposes. She states that he has had advancing dementia for some time (>15yrs) and has become more impulsive recently. She states that he normally walks without assistance but has been less mobile of late following an admission in October for suspected ileus. Daughter also expresses concerns regarding 30-40lb weight loss over the past 9mo but denies any cancer history. Patient was brought to the ED and found to have a right hip fracture for which we have been asked to see the patient. Patient Active Problem List    Diagnosis Date Noted    S/P right hip fracture 12/30/2018     History reviewed. No pertinent family history. Social History     Tobacco Use    Smoking status: Never Smoker    Smokeless tobacco: Never Used   Substance Use Topics    Alcohol use: No     Frequency: Never     Past Medical History:   Diagnosis Date    Dementia     Hypertension     Hypothyroidism     Pacemaker     Pituitary tumor     Sick sinus syndrome ([address]       Past Surgical History:   Procedure Laterality Date    HX PACEMAKER        Prior to Admission medications    Medication Sig Start Date End Date Taking? Authorizing Provider   cephALEXin (KEFLEX) 250 mg capsule Take 1 Cap by mouth two (2) times a day. 10/22/18   Kandice Denton MD   baclofen (LIORESAL) 10 mg tablet Take 0.5 Tabs by mouth three (3) times daily as needed.  3/9/17   Elham Capone MD   albuterol (PROVENTIL HFA, VENTOLIN HFA, PROAIR HFA) 90 mcg/actuation inhaler Take 2 Puffs by inhalation every four (4) hours as needed for Wheezing. 3/9/17   Rhonda Jones MD   inhalational spacing device 1 Each by Does Not Apply route as needed. 3/9/17   Rhonda Jones MD   oxyCODONE-acetaminophen (PERCOCET) 5-325 mg per tablet Take 1 Tab by mouth every four (4) hours as needed for Pain. Max Daily Amount: 6 Tabs. 9/26/15   Jude Arellano MD   lidocaine (LIDODERM) 5 %(700 mg/patch) 1 Patch by TransDERmal route every twenty-four (24) hours. 9/26/15   Jude Arellano MD   omeprazole (PRILOSEC) 40 mg capsule Take 20 mg by mouth daily. Marilyn Segura MD   QUEtiapine (SEROQUEL) 25 mg tablet Take 25 mg by mouth two (2) times a day. Marilyn Segura MD   levothyroxine (SYNTHROID) 125 mcg tablet Take 150 mcg by mouth Daily (before breakfast). Marilyn Segura MD   aspirin delayed-release 81 mg tablet Take 81 mg by mouth daily. Marilyn Segura MD   cetirizine (ZYRTEC) 10 mg tablet Take 10 mg by mouth. Marilyn Segura MD   memantine ER (NAMENDA XR) 28 mg capsule Take 28 mg by mouth daily. Marilyn Segura MD   donepezil (ARICEPT) 10 mg tablet Take 10 mg by mouth daily. Marilyn Segura MD   tamsulosin (FLOMAX) 0.4 mg capsule Take 0.4 mg by mouth daily. Marilyn Segura MD   simvastatin (ZOCOR) 40 mg tablet Take 40 mg by mouth nightly. Marilyn Segura MD   sertraline (ZOLOFT) 100 mg tablet Take 100 mg by mouth daily. Marilyn Segura MD     No current facility-administered medications for this encounter. Current Outpatient Medications   Medication Sig    cephALEXin (KEFLEX) 250 mg capsule Take 1 Cap by mouth two (2) times a day.  baclofen (LIORESAL) 10 mg tablet Take 0.5 Tabs by mouth three (3) times daily as needed.  albuterol (PROVENTIL HFA, VENTOLIN HFA, PROAIR HFA) 90 mcg/actuation inhaler Take 2 Puffs by inhalation every four (4) hours as needed for Wheezing.  inhalational spacing device 1 Each by Does Not Apply route as needed.     oxyCODONE-acetaminophen (PERCOCET) 5-325 mg per tablet Take 1 Tab by mouth every four (4) hours as needed for Pain. Max Daily Amount: 6 Tabs.  lidocaine (LIDODERM) 5 %(700 mg/patch) 1 Patch by TransDERmal route every twenty-four (24) hours.  omeprazole (PRILOSEC) 40 mg capsule Take 20 mg by mouth daily.  QUEtiapine (SEROQUEL) 25 mg tablet Take 25 mg by mouth two (2) times a day.  levothyroxine (SYNTHROID) 125 mcg tablet Take 150 mcg by mouth Daily (before breakfast).  aspirin delayed-release 81 mg tablet Take 81 mg by mouth daily.  cetirizine (ZYRTEC) 10 mg tablet Take 10 mg by mouth.  memantine ER (NAMENDA XR) 28 mg capsule Take 28 mg by mouth daily.  donepezil (ARICEPT) 10 mg tablet Take 10 mg by mouth daily.  tamsulosin (FLOMAX) 0.4 mg capsule Take 0.4 mg by mouth daily.  simvastatin (ZOCOR) 40 mg tablet Take 40 mg by mouth nightly.  sertraline (ZOLOFT) 100 mg tablet Take 100 mg by mouth daily. Allergies   Allergen Reactions    Exelon [Rivastigmine] Rash        Review of Systems:  Review of systems not obtained due to patient factors.     Mental Status: severe dementia    Objective:     Patient Vitals for the past 8 hrs:   BP Temp Pulse Resp SpO2   18 2100 151/55    92 %   18 2000 178/87    95 %   18 1900 155/81    94 %   18 1833 (!) 154/97 98.3 °F (36.8 °C) (!) 58 16 93 %     Temp (24hrs), Av.3 °F (36.8 °C), Min:98.3 °F (36.8 °C), Max:98.3 °F (36.8 °C)      EXAM: Awake and alert but not oriented; NAD; agreeable to exam  Right leg externally rotated and slightly shortened when compared to the left  Log roll of leg causes grimace  Does not respond to motor or sensory function requests  Distal pulses palpable    Imaging Review: EXAM: XR HIP RT W OR WO PELV 2-3 VWS     INDICATION: Trauma.     COMPARISON: None.     FINDINGS: A portable AP view of the pelvis and a frogleg lateral view of the  right hip demonstrate a right subcapital femoral neck fracture with varus  deformity. .     IMPRESSION  IMPRESSION: There is a right femoral neck fracture which appears to be a  subcapital femoral neck fracture. Labs:   Recent Results (from the past 24 hour(s))   CBC WITH AUTOMATED DIFF    Collection Time: 12/30/18  8:22 PM   Result Value Ref Range    WBC 8.1 4.1 - 11.1 K/uL    RBC 4.54 4.10 - 5.70 M/uL    HGB 14.0 12.1 - 17.0 g/dL    HCT 42.6 36.6 - 50.3 %    MCV 93.8 80.0 - 99.0 FL    MCH 30.8 26.0 - 34.0 PG    MCHC 32.9 30.0 - 36.5 g/dL    RDW 13.4 11.5 - 14.5 %    PLATELET 817 415 - 389 K/uL    MPV 10.2 8.9 - 12.9 FL    NRBC 0.0 0  WBC    ABSOLUTE NRBC 0.00 0.00 - 0.01 K/uL    NEUTROPHILS 78 (H) 32 - 75 %    LYMPHOCYTES 14 12 - 49 %    MONOCYTES 6 5 - 13 %    EOSINOPHILS 1 0 - 7 %    BASOPHILS 0 0 - 1 %    IMMATURE GRANULOCYTES 1 (H) 0.0 - 0.5 %    ABS. NEUTROPHILS 6.4 1.8 - 8.0 K/UL    ABS. LYMPHOCYTES 1.2 0.8 - 3.5 K/UL    ABS. MONOCYTES 0.5 0.0 - 1.0 K/UL    ABS. EOSINOPHILS 0.1 0.0 - 0.4 K/UL    ABS. BASOPHILS 0.0 0.0 - 0.1 K/UL    ABS. IMM. GRANS. 0.0 0.00 - 0.04 K/UL    DF AUTOMATED     PTT    Collection Time: 12/30/18  8:22 PM   Result Value Ref Range    aPTT 23.3 22.1 - 32.0 sec    aPTT, therapeutic range     58.0 - 77.0 SECS   PROTHROMBIN TIME + INR    Collection Time: 12/30/18  8:22 PM   Result Value Ref Range    INR 1.1 0.9 - 1.1      Prothrombin time 10.9 9.0 - 11.1 sec   SAMPLES BEING HELD    Collection Time: 12/30/18  8:22 PM   Result Value Ref Range    SAMPLES BEING HELD 1RED     COMMENT        Add-on orders for these samples will be processed based on acceptable specimen integrity and analyte stability, which may vary by analyte.          Impression:     Active Problems:    S/P right hip fracture (12/30/2018)        Plan:     Suspected right femoral neck fracture - some concern for intertroch extension; CT ordered for confirmation  Have spoken with daughter regarding surgical fixation and risks and benefits specifically pain reduction and reduction of risk of blood clots, PNAs and further debility  Fracture would warrant either hemiarthroplasty or IM nail depending on final fracture pattern analysis  Admit to Medicine for pre-op evaluation and medical management  Pain control  Bed rest  Scott placement  NPO after midnight  Consent to be signed by daughter and placed on chart  To OR likely morning of 12/31    Dr Elidia Meredith aware of patient and agrees with current plan of care    Cora Ac PA-C  Orthopedic Trauma Service  [facility]

## 2018-12-31 NOTE — ROUTINE PROCESS
Patient: Dontrell Clayton MRN: [id number]  SSN: [id number] YOB: 1940  Age: 77 y.o. Sex: male Patient is status post Procedure(s): RIGHT HIP HEMIARTHROPLASTY. Surgeon(s) and Role: Maxi Olvio MD - Primary Local/Dose/Irrigation: 
 
             
Peripheral IV 12/30/18 Right Antecubital (Active) Site Assessment Clean, dry, & intact 12/31/2018  2:07 AM  
Phlebitis Assessment 0 12/31/2018  2:07 AM  
Infiltration Assessment 0 12/31/2018  2:07 AM  
Dressing Status Clean, dry, & intact 12/31/2018  2:07 AM  
Dressing Type Transparent 12/31/2018  2:07 AM  
Hub Color/Line Status Capped 12/31/2018  2:07 AM  
Action Taken Open ports on tubing capped 12/31/2018  2:07 AM  
Alcohol Cap Used Yes 12/31/2018  2:07 AM  
   
Peripheral IV 12/31/18 Right Arm (Active) Airway - Endotracheal Tube 12/31/18 Oral (Active) Dressing/Packing:  Wound Hip Right-DRESSING TYPE: Aquacel (12/31/18 0700) Splint/Cast:  ] Other:

## 2018-12-31 NOTE — H&P
[address] [facility] HISTORY AND PHYSICAL Katt Landers 
MR#: [id number] : 1940 ACCOUNT #: [de-identified] ADMIT DATE: 2018 Time of dictation 2137 hours. PRIMARY CARE PHYSICIAN:  Dr. Seb Portillo. CHIEF COMPLAINT:  Fall with right hip pain. HISTORY OF PRESENT ILLNESS:  Please note, most of the history was obtained from patient's daughter at the bedside as well as the EMS or nursing home records as the patient with a history of advanced Alzheimer dementia and unable to give any history. 42-year-old gentleman with a past medical history significant for advanced Alzheimer dementia, primary hypertension, hypothyroidism, history of pacemaker placement for sick sinus syndrome without any battery life for the past year and a pituitary tumor, was brought to the emergency room by EMS from the [facility] facility for fall with right hip pain. Per patient's daughter and the nursing home documentation, patient tripped and fell onto his right side. There was no documentation of prior complaints of headache, chest pains, shortness of breath, cough, fevers, chills, abdominal pain, bladder or bowel irregularities. PAST MEDICAL HISTORY: 
1. Alzheimer's dementia. 2.  Primary hypertension. 3.  Hypothyroidism. 4.  Pituitary tumor. 5.  Sick sinus syndrome. PAST SURGICAL HISTORY:  Pacemaker placement. MEDICATIONS:  At the facility are as follows: 1. Albuterol inhaler as needed. 2.  Aspirin 81 mg p.o. daily. 3.  Baclofen 10 mg half a tablet p.o. 3 times a day as needed. 4.  Keflex 250 mg 1 tablet p.o. twice a day, completed 5. Zyrtec 10 mg p.o. daily. 6.  Donepezil 10 mg p.o. daily. 7.  Levothyroxine 150 mcg p.o. daily. 8.  Lidoderm patch every 24 hours . 9. Mylanta and memantine 28 mg p.o. daily. 10.  Oxycodone/acetaminophen 5/325 one tablet p.o. every 4 hours as needed for pain. 11.  Seroquel 25 mg p.o. twice a day. 12.  Sertraline 100 mg p.o. daily. 13.  Simvastatin 40 mg p.o. daily. 14.  Tamsulosin 0.5 mg p.o. daily. ALLERGIES:  PATIENT HAS ALLERGIES TO EXELON. SOCIAL HISTORY:  Significant for residing at the memory clinic; requires assistance with activities and instrumental activities of daily living. No history of tobacco use. FAMILY HISTORY:  Reviewed and negative for any premature coronary artery disease or death. Negative for malignancy and negative for diabetes mellitus. REVIEW OF SYSTEMS:  Attempted 12-14-point system review; however, due to patient's advanced Alzheimer dementia, unable to give any pertinent history; however, per patient's daughter, negative otherwise as outlined in the history of the presenting illness. PHYSICAL EXAMINATION: 
GENERAL:  Patient was awake, alert, not in any acute distress. VITAL SIGNS:  Blood pressure of 154/97, heart rate 58, respiratory rate 16, afebrile, saturating 93% on room air. HEAD:  Normocephalic. EYES:  Pupils equal and reactive to light. EARS, NOSE, THROAT:  Clear. NECK:  No jugular venous distention, no carotid bruits. HEART:  S1, S2 present. RESPIRATORY:  Lungs with decreased air entry at both bases without any crepitations or rhonchi. GASTROINTESTINAL:  Bowel sounds present. The abdomen is soft, nontender; no rebound, no guarding. GENITOURINARY:  No suprapubic or flank tenderness. MUSCULOSKELETAL:   1+  pedal edema. No asymmetry of the extremities. Decreased range of motion at the right hip joint. SKIN:  Nil of note CENTRAL NERVOUS SYSTEM:  Patient was awake, alert; however, unable to ascertain any orientation. PSYCHIATRIC:  Normal mood. LABORATORY DATA  AND  RADIOGRAPHIC FINDINGS are as follows:  Chest x-ray with clear lungs. Pacemaker in situ.   An x-ray of the right hip with a right femoral neck fracture, which appears to be subcapital femoral which appears to be subcapital. CBC with a WBC of 8.1, hemoglobin 14, hematocrit 42.6, a platelet count of 155. Metabolic panel with a sodium of 143, potassium 3.8, chloride of 109, bicarb of 13, BUN of 16, creatinine 1.00, glucose of 106, calcium of 8.1. Normal liver function tests. Urinalysis is pending. ASSESSMENT AND PLAN: 
1.  Musculoskeletal:  Patient will be admitted to the inpatient level remote telemetry floor for further management of fall with acute right femoral neck fracture. Probable underlying osteoporosis. We will keep nothing by mouth status, IV fluids, IV analgesia. Orthopedic surgical evaluation in progress for possible surgical repair within 24 hours. 2.  Cardiovascular:   History of pacemaker placement for sick sinus syndrome without any battery life in the past year. Patient's cardiologist aware. No dysrhythmias noted. Fairly well controlled primary hypertension. 3.  Dyslipidemia. 4.  Endocrine:  Hypothyroidism, on Levothyroxine. 5.  Central nervous system:  History of a pituitary tumor. Probable advanced Alzheimer dementia without any current behavioral disturbances. Will continue Aricept and memantine. 5.  Prophylaxis for DVT with SCDs in the preoperative period. 6.  Directives. DDNR on the record. Discussed with the patient's daughter, power of . In summary, 67-year-old gentleman with a few medical problems including advanced Alzheimer dementia, prior pacemaker placement for sick sinus syndrome without any battery life for 1 year per patient's power of 's decision in conjunction with the patient's cardiologist is being admitted to the inpatient level remote telemetry floor for management of fall with an acute right femoral neck fracture.   Patient is at increased risk for further decompensation and will benefit from inpatient management includingwith orthopedic surgical consult for surgical.  The entire admission plans discussed in detail with the patient and patient's power of , daughter, Dax Sarabia, who was at the bedside and can be reached at cell, [phone]. All questions and concerns were addressed. Total time for admission, 50 minutes of which at least 50% of the time was spent in plan of care and counseling of the patient. Emiliano Herring MD 
 
  
STM/MN 
D: 12/30/2018 21:47    
T: 12/30/2018 22:35 JOB #: [id number]

## 2018-12-31 NOTE — BRIEF OP NOTE
BRIEF OPERATIVE NOTE Date of Procedure: 12/31/2018 Preoperative Diagnosis: Right hip fracture Postoperative Diagnosis: Right hip fracture Procedure(s): RIGHT HIP HEMIARTHROPLASTY Surgeon(s) and Role: Eual Schlatter, MD - Primary Surgical Assistant: Claudio Jordan 
 
Surgical Staff: 
Circ-1: Tierra Hallman RN Scrub RN-1: Luis Carlos Barroso RN Surg Asst-1: Jaya Broussard Event Time In Time Out Incision Start 12/31/2018 6159 Incision Close 12/31/2018 0858 Anesthesia: General  
Estimated Blood Loss: min Specimens: * No specimens in log * Findings: as abvoe Complications: none Implants:  
Implant Name Type Inv. Item Serial No.  Lot No. LRB No. Used Action STEM FEM ECHO BIMTRC II [id number] --  - SNA Joint Component STEM FEM ECHO BIMTRC II [id number] --  [employer] [id number] Right 1 Implanted HEAD FEM TYP 1 TAPR MOD 28MM --  - SNA Joint Component HEAD FEM TYP 1 TAPR MOD 28MM --  [employer] [id number] Right 1 Implanted CUP ACET BPLR [id number] --  - SNA Joint Component CUP ACET BPLR [id number] TI --  [employer] [id number] Right 1 Implanted

## 2019-01-01 LAB
ANION GAP SERPL CALC-SCNC: 7 MMOL/L (ref 5–15)
BUN SERPL-MCNC: 15 MG/DL (ref 6–20)
BUN/CREAT SERPL: 17 (ref 12–20)
CALCIUM SERPL-MCNC: 7.6 MG/DL (ref 8.5–10.1)
CHLORIDE SERPL-SCNC: 109 MMOL/L (ref 97–108)
CO2 SERPL-SCNC: 22 MMOL/L (ref 21–32)
CREAT SERPL-MCNC: 0.89 MG/DL (ref 0.7–1.3)
GLUCOSE SERPL-MCNC: 101 MG/DL (ref 65–100)
HGB BLD-MCNC: 11.7 G/DL (ref 12.1–17)
POTASSIUM SERPL-SCNC: 3.9 MMOL/L (ref 3.5–5.1)
SODIUM SERPL-SCNC: 138 MMOL/L (ref 136–145)

## 2019-01-01 PROCEDURE — 74011250637 HC RX REV CODE- 250/637: Performed by: ORTHOPAEDIC SURGERY

## 2019-01-01 PROCEDURE — G8989 SELF CARE D/C STATUS: HCPCS

## 2019-01-01 PROCEDURE — 74011000258 HC RX REV CODE- 258: Performed by: INTERNAL MEDICINE

## 2019-01-01 PROCEDURE — 74011250636 HC RX REV CODE- 250/636: Performed by: INTERNAL MEDICINE

## 2019-01-01 PROCEDURE — 85018 HEMOGLOBIN: CPT

## 2019-01-01 PROCEDURE — 74011250637 HC RX REV CODE- 250/637: Performed by: INTERNAL MEDICINE

## 2019-01-01 PROCEDURE — 94760 N-INVAS EAR/PLS OXIMETRY 1: CPT

## 2019-01-01 PROCEDURE — 74011250636 HC RX REV CODE- 250/636: Performed by: ORTHOPAEDIC SURGERY

## 2019-01-01 PROCEDURE — 74011250637 HC RX REV CODE- 250/637: Performed by: PHYSICIAN ASSISTANT

## 2019-01-01 PROCEDURE — 77030036660

## 2019-01-01 PROCEDURE — 97165 OT EVAL LOW COMPLEX 30 MIN: CPT

## 2019-01-01 PROCEDURE — 77010033678 HC OXYGEN DAILY

## 2019-01-01 PROCEDURE — G8988 SELF CARE GOAL STATUS: HCPCS

## 2019-01-01 PROCEDURE — 97530 THERAPEUTIC ACTIVITIES: CPT

## 2019-01-01 PROCEDURE — 36415 COLL VENOUS BLD VENIPUNCTURE: CPT

## 2019-01-01 PROCEDURE — G8987 SELF CARE CURRENT STATUS: HCPCS

## 2019-01-01 PROCEDURE — 74011000250 HC RX REV CODE- 250: Performed by: INTERNAL MEDICINE

## 2019-01-01 PROCEDURE — 80048 BASIC METABOLIC PNL TOTAL CA: CPT

## 2019-01-01 PROCEDURE — 74011000250 HC RX REV CODE- 250: Performed by: ORTHOPAEDIC SURGERY

## 2019-01-01 PROCEDURE — 97162 PT EVAL MOD COMPLEX 30 MIN: CPT

## 2019-01-01 PROCEDURE — 65270000029 HC RM PRIVATE

## 2019-01-01 RX ADMIN — ACETAMINOPHEN 650 MG: 325 TABLET ORAL at 21:26

## 2019-01-01 RX ADMIN — STANDARDIZED SENNA CONCENTRATE AND DOCUSATE SODIUM 1 TABLET: 8.6; 5 TABLET, FILM COATED ORAL at 18:01

## 2019-01-01 RX ADMIN — STANDARDIZED SENNA CONCENTRATE AND DOCUSATE SODIUM 1 TABLET: 8.6; 5 TABLET, FILM COATED ORAL at 10:31

## 2019-01-01 RX ADMIN — PANTOPRAZOLE SODIUM 40 MG: 40 TABLET, DELAYED RELEASE ORAL at 07:48

## 2019-01-01 RX ADMIN — ACETAMINOPHEN 650 MG: 325 TABLET ORAL at 18:01

## 2019-01-01 RX ADMIN — SODIUM CHLORIDE 125 ML/HR: 900 INJECTION, SOLUTION INTRAVENOUS at 01:08

## 2019-01-01 RX ADMIN — ACETAMINOPHEN 650 MG: 325 TABLET ORAL at 00:21

## 2019-01-01 RX ADMIN — LEVOTHYROXINE SODIUM 150 MCG: 150 TABLET ORAL at 07:48

## 2019-01-01 RX ADMIN — MEMANTINE 10 MG: 10 TABLET ORAL at 18:01

## 2019-01-01 RX ADMIN — QUETIAPINE FUMARATE 25 MG: 25 TABLET ORAL at 18:01

## 2019-01-01 RX ADMIN — ACETAMINOPHEN 650 MG: 325 TABLET ORAL at 07:48

## 2019-01-01 RX ADMIN — CALCIUM CARBONATE-VITAMIN D TAB 500 MG-200 UNIT 1 TABLET: 500-200 TAB at 10:31

## 2019-01-01 RX ADMIN — CETIRIZINE HYDROCHLORIDE 10 MG: 10 TABLET, FILM COATED ORAL at 10:31

## 2019-01-01 RX ADMIN — CALCIUM CARBONATE-VITAMIN D TAB 500 MG-200 UNIT 1 TABLET: 500-200 TAB at 18:01

## 2019-01-01 RX ADMIN — SIMVASTATIN 40 MG: 40 TABLET, FILM COATED ORAL at 21:26

## 2019-01-01 RX ADMIN — Medication 10 ML: at 21:26

## 2019-01-01 RX ADMIN — DONEPEZIL HYDROCHLORIDE 10 MG: 10 TABLET ORAL at 10:31

## 2019-01-01 RX ADMIN — Medication 2 G: at 00:22

## 2019-01-01 RX ADMIN — POLYETHYLENE GLYCOL 3350 17 G: 17 POWDER, FOR SOLUTION ORAL at 10:31

## 2019-01-01 RX ADMIN — ACETAMINOPHEN 650 MG: 325 TABLET ORAL at 13:01

## 2019-01-01 RX ADMIN — DEXTROSE MONOHYDRATE AND SODIUM CHLORIDE 75 ML/HR: 5; .45 INJECTION, SOLUTION INTRAVENOUS at 10:31

## 2019-01-01 RX ADMIN — QUETIAPINE FUMARATE 25 MG: 25 TABLET ORAL at 10:31

## 2019-01-01 RX ADMIN — CALCIUM CARBONATE-VITAMIN D TAB 500 MG-200 UNIT 1 TABLET: 500-200 TAB at 13:01

## 2019-01-01 RX ADMIN — PROCHLORPERAZINE EDISYLATE 2.5 MG: 5 INJECTION INTRAMUSCULAR; INTRAVENOUS at 19:10

## 2019-01-01 RX ADMIN — TAMSULOSIN HYDROCHLORIDE 0.4 MG: 0.4 CAPSULE ORAL at 10:31

## 2019-01-01 RX ADMIN — SERTRALINE HYDROCHLORIDE 100 MG: 50 TABLET ORAL at 10:31

## 2019-01-01 RX ADMIN — ENOXAPARIN SODIUM 40 MG: 40 INJECTION SUBCUTANEOUS at 10:31

## 2019-01-01 RX ADMIN — MEMANTINE 10 MG: 10 TABLET ORAL at 10:31

## 2019-01-01 NOTE — PROGRESS NOTES
Bedside shift change report given to [facility] (oncoming nurse) by Evan Carey (offgoing nurse). Report included the following information SBAR, Kardex, Intake/Output, MAR and Recent Results.

## 2019-01-01 NOTE — ROUTINE PROCESS
Bedside shift change report given to Christian fox RN (oncoming nurse) by Lilliana Waterman RN(offgoing nurse). Report given with SBAR.

## 2019-01-01 NOTE — ROUTINE PROCESS
Primary Nurse Akbar Herrera RN and Oxana Junior RN performed a dual skin assessment on this patient Impairment noted- see wound doc flow sheet Jules score is 17 Abrasion right elbow

## 2019-01-01 NOTE — PROGRESS NOTES
Bedside and Verbal shift change report given to Joaquin Swenson RN (oncoming nurse) by Suraj Catherine RN (offgoing nurse). Report included the following information SBAR, Kardex and MAR.

## 2019-01-01 NOTE — PROGRESS NOTES
Hospitalist Progress Note William Murray MD 
Call  Date of Service:  2019 NAME:  Indio Soto :  1940 MRN:  [id number] Admission Summary: S/p fall and right hip fracture Interval history / Subjective: No complaint. Physical therapy working with patient. Son in the room visiting. Assessment & Plan: Hx of fall and right hip fracture s/p right hemiarthroplasty by ortho service 
-no complaints of pain 
-on pain and bowel regimen as guided by ortho service. -PT/OT working with patient 
-Post-op lab reviewed History of pacemaker placement for sick sinus syndrome without any battery life in the past year. 2018 hospitalist note: \"Patient's cardiologist aware. No dysrhythmias noted. Fairly well controlled primary hypertension. \" Hx of dyslipidemia/hyperlipidemia 
-on statin Hx of hypothyroidism -on replacement History of a pituitary tumor. Probable advanced dementia without any current behavioral disturbances.   
-on aricpet and memantine and other home meds 2018 hospitalist note: \"Directives. DDNR on the record. Discussed with the patient's daughter, power of . \" 
 
DVT prophylaxis: lovenox, as per ortho recs Care Plan discussed with:patient/staff Disposition:TBD Hospital Problems  Never Reviewed Codes Class Noted POA  
 S/P right hip fracture ICD-10-CM: Z87.81 ICD-9-CM: V15.51  2018 Unknown Review of Systems:  
Unable to obtain due to current state, dementia Vital Signs:  
 Last 24hrs VS reviewed since prior progress note. Most recent are: 
Visit Vitals /77 (BP 1 Location: Right arm, BP Patient Position: At rest) Pulse (!) 56 Temp 98.9 °F (37.2 °C) Resp 16 Ht 5' 7\" (1.702 m) SpO2 90% BMI 29.52 kg/m² Intake/Output Summary (Last 24 hours) at 2019 8489 Last data filed at 2019 0113 Gross per 24 hour Intake  Output 460 ml Net -460 ml Physical Examination:  
 
 
     
Constitutional:  No acute distress, pleasant   
ENT:  Neck supple, Resp:  No wheezing/rhonchi/rales. No accessory muscle use CV:  Regular rhythm, normal rate, no gallops or rubs GI:  Soft, non distended, non tender. normoactive bowel sounds Musculoskeletal:  No edema, pulses present, no cyanosis Neurologic:  follows command, tracks with eyes, flat affect, alert but not oriented Data Review:  
 
 
 
Labs:  
 
Recent Labs 01/01/19 
0500 12/31/18 
0254 12/30/18 2022 WBC  --  10.5 8.1 HGB 11.7* 13.6 14.0  
HCT  --  40.8 42.6 PLT  --  170 182 Recent Labs 01/01/19 
0500 12/31/18 
0254 12/30/18 2022  139 143  
K 3.9 3.7 3.8 * 108 109* CO2 22 26 30 BUN 15 13 16 CREA 0.89 0.77 1.00 * 139* 106* CA 7.6* 7.8* 8.1* Recent Labs 12/30/18 2022 SGOT 25 ALT 32 * TBILI 0.5 TP 6.7 ALB 3.8 GLOB 2.9 Recent Labs 12/31/18 
0254 12/30/18 2022 INR 1.1 1.1 PTP 11.3* 10.9 APTT  --  23.3 Lab Results Component Value Date/Time Color DARK YELLOW 12/31/2018 06:38 AM  
 Appearance CLEAR 12/31/2018 06:38 AM  
 Specific gravity 1.029 12/31/2018 06:38 AM  
 Specific gravity 1.015 10/22/2018 07:03 PM  
 pH (UA) 6.0 12/31/2018 06:38 AM  
 Protein 30 (A) 12/31/2018 06:38 AM  
 Glucose NEGATIVE  12/31/2018 06:38 AM  
 Ketone TRACE (A) 12/31/2018 06:38 AM  
 Bilirubin NEGATIVE  10/19/2018 04:50 PM  
 Urobilinogen 1.0 12/31/2018 06:38 AM  
 Nitrites NEGATIVE  12/31/2018 06:38 AM  
 Leukocyte Esterase NEGATIVE  12/31/2018 06:38 AM  
 Epithelial cells MODERATE (A) 12/31/2018 06:38 AM  
 Bacteria NEGATIVE  12/31/2018 06:38 AM  
 WBC 0-4 12/31/2018 06:38 AM  
 RBC 0-5 12/31/2018 06:38 AM  
 
 
 
Medications Reviewed:  
 
______________________________________________________________________ EXPECTED LENGTH OF STAY: 2d 4h 
 ACTUAL LENGTH OF STAY:          2 Maurice Mcallister MD

## 2019-01-01 NOTE — PROGRESS NOTES
Occupational Therapy EVALUATION/discharge Patient: Isabel Wong (35 y.o. male) Date: 1/1/2019 Primary Diagnosis: S/P right hip fracture Procedure(s) (LRB): 
RIGHT HIP HEMIARTHROPLASTY (Right) 1 Day Post-Op Precautions:   Fall, Bed Alarm, WBAT, Total hip(posterior THR precautions) ASSESSMENT:  
Based on the objective data described below, the patient presents with decreased independence with all self care and functional mobility following admission for fall resulting in hip fracture and now POD 1 from surgical correction of hip. Pt with posterior hip precautions at this time and also with history of advanced dementia. Pt in memory care prior to admission. Pt currently oriented only to person and son present during evaluation. Attempted discussion with patient but he was unable to answer questions or provide any information. Son provided history as follows. Pt ambulated independently around the memory care unit but needs supervision for safety. He has assistance for bathing 3 x week, dressing daily, and uses depends for toileting. He has all meals provided in community dinning area. Pt typically oriented to self only. This date, pt requires total A for all ADL activities due to impaired cognition. Pt not capable of new learning at this time and unable to process and following posterior hip precautions. Pt will not benefit from AE as he will not be able to learn and carry over AE training. Discussed with son and he was in agreement pt will not benefit from ADL training. Recommend pt to return to familiar setting and have ADL assistance to maintain precautions by nursing staff. Provided written education to son for posterior hip precautions. No further OT needs at this time. Discharge OT services. Further skilled acute occupational therapy is not indicated at this time. Discharge Recommendations: Eddie Carrasquillo with increased A for ADL activities. Further Equipment Recommendations for Discharge: none SUBJECTIVE:  
Patient stated Ariana Lot.  OBJECTIVE DATA SUMMARY:  
HISTORY:  
Past Medical History:  
Diagnosis Date  Dementia  Hypertension  Hypothyroidism  Pacemaker  Pituitary tumor  Sick sinus syndrome ([address] Past Surgical History:  
Procedure Laterality Date  HX PACEMAKER Prior Level of Function/Environment/Context: pt's son providing history. Expanded or extensive additional review of patient history:  
 
Home Situation Home Environment: Assisted living([place]) Care Facility Name: [address] [place] [address] [address] One/Two Story Residence: One story Living Alone: No 
Support Systems: Child(dee) Patient Expects to be Discharged to[de-identified] (return to [address] [facility] [address]) Current DME Used/Available at Home: None Tub or Shower Type: Shower Hand dominance: Right EXAMINATION OF PERFORMANCE DEFICITS: 
Cognitive/Behavioral Status: 
Neurologic State: Alert;Confused Orientation Level: Disoriented to place; Disoriented to situation;Disoriented to time;Oriented to person Cognition: Decreased attention/concentration;Decreased command following Perception: Appears intact Perseveration: Perseverates during conversation Safety/Judgement: Decreased awareness of environment;Decreased awareness of need for assistance;Decreased awareness of need for safety;Decreased insight into deficits Skin: see nursing note Edema: none noted Hearing: Auditory Auditory Impairment: Hard of hearing, bilateral 
Vision/Perceptual:   
    
    
    
  
    
Acuity: Within Defined Limits Range of Motion: 
 
AROM: Generally decreased, functional 
PROM: Generally decreased, functional 
  
  
  
  
  
  
Strength: 
 
Strength: Generally decreased, functional 
  
  
  
  
 
Coordination: 
Coordination: Generally decreased, functional 
Fine Motor Skills-Upper: Right Intact; Left Intact Gross Motor Skills-Upper: Right Intact; Left Intact Tone & Sensation: Tone: Normal 
Sensation: Intact Balance: 
Sitting: (unable to progress to EOB) Sitting - Static: (unable to progress to EOB) Sitting - Dynamic: (unable to progress to EOB) Standing: (unable) Functional Mobility and Transfers for ADLs:Bed Mobility: 
Supine to Sit: (unable to progress to EOB) Sit to Supine: (unable to progress to EOB) Scooting: Total assistance;Assist x2 Transfers: 
Sit to Stand: (unable to progress to EOB) Stand to Sit: (unable to progress to EOB) Bed to Chair: (unable to progress to EOB) Bathroom Mobility: (unable to progress to EOB) Toilet Transfer : (unable to progress to EOB) ADL Assessment: 
Feeding: Supervision Oral Facial Hygiene/Grooming: Supervision Bathing: Total assistance Upper Body Dressing: Total assistance(due to confusion) Lower Body Dressing: Total assistance(due to posterior precautions) Toileting: Total assistance ADL Intervention and task modifications: 
  
Aborted Progressing OOB due to impaired cognition and unsafe condition with progressing pt to EOB. Discussed mobility with PT and pt required max A x 2 persons. Cognitive Retraining Safety/Judgement: Decreased awareness of environment;Decreased awareness of need for assistance;Decreased awareness of need for safety;Decreased insight into deficits Functional Measure: 
Barthel Index: 
 
Bathin Bladder: 0 Bowels: 5 Groomin Dressin Feedin Mobility: 0 Stairs: 0 Toilet Use: 0 Transfer (Bed to Chair and Back): 5 Total: 10 Barthel and G-code impairment scale: 
Percentage of impairment CH 
0% CI 
1-19% CJ 
20-39% CK 
40-59% CL 
60-79% CM 
80-99% CN 
100% Barthel Score 0-100 100 99-80 79-60 59-40 20-39 1-19 
 0 Barthel Score 0-20 20 17-19 13-16 9-12 5-8 1-4 0 The Barthel ADL Index: Guidelines 1. The index should be used as a record of what a patient does, not as a record of what a patient could do. 2. The main aim is to establish degree of independence from any help, physical or verbal, however minor and for whatever reason. 3. The need for supervision renders the patient not independent. 4. A patient's performance should be established using the best available evidence. Asking the patient, friends/relatives and nurses are the usual sources, but direct observation and common sense are also important. However direct testing is not needed. 5. Usually the patient's performance over the preceding 24-48 hours is important, but occasionally longer periods will be relevant. 6. Middle categories imply that the patient supplies over 50 per cent of the effort. 7. Use of aids to be independent is allowed. Cristina Davenport., Barthel, D.W. (2562). Functional evaluation: the Barthel Index. [address] [facility] (14)2. Dara Pemberton dustin AMIE Esposito, Justin Jacobs., [place] [employer] Marvin, [facility] (1999). Measuring the change indisability after inpatient rehabilitation; comparison of the responsiveness of the Barthel Index and Functional [place] Measure. Journal of Neurology, Neurosurgery, and Psychiatry, 66(4), 125-330. Andrzej Mohr, N.J.A, LORIE Pillai, & Keri Booth, M.A. (2004.) Assessment of post-stroke quality of life in cost-effectiveness studies: The usefulness of the Barthel Index and the EuroQoL-5D. [facility], 13, 504-31 G codes: In compliance with CMSs Claims Based Outcome Reporting, the following G-code set was chosen for this patient based on their primary functional limitation being treated: The outcome measure chosen to determine the severity of the functional limitation was the Barthel Index with a score of 10/100 which was correlated with the impairment scale. ? Self Care:  
  - CURRENT STATUS: CM - 80%-99% impaired, limited or restricted  - GOAL STATUS: CM - 80%-99% impaired, limited or restricted  - D/C STATUS:  CM - 80%-99% impaired, limited or restricted Occupational Therapy Evaluation Charge Determination History Examination Decision-Making HIGH Complexity : Extensive review of history including physical, cognitive and psychosocial history  LOW Complexity : 1-3 performance deficits relating to physical, cognitive , or psychosocial skils that result in activity limitations and / or participation restrictions  LOW Complexity : No comorbidities that affect functional and no verbal or physical assistance needed to complete eval tasks Based on the above components, the patient evaluation is determined to be of the following complexity level: LOW Pain: 
Pain Scale 1: Numeric (0 - 10) Pain Intensity 1: 0 Activity Tolerance: VSS throughout session. After treatment:  
[]  Patient left in no apparent distress sitting up in chair 
[x]  Patient left in no apparent distress in bed 
[x]  Call bell left within reach [x]  Nursing notified 
[x]  Caregiver present 
[]  Bed alarm activated COMMUNICATION/EDUCATION:  
Communication/Collaboration: 
[x]      Home safety education was provided and the patient/caregiver indicated understanding. [x]      Patient/family have participated as able and agree with findings and recommendations. []      Patient is unable to participate in plan of care at this time. Findings and recommendations were discussed with: Physical Therapist and Registered Nurse Wilbur Bains OT Time Calculation: 10 mins

## 2019-01-01 NOTE — PROGRESS NOTES
ORTH FRACTURE PROGRESS NOTE 2019 Admit Date:  
2018 Post Op day: 1 Day Post-Op Subjective:   
Leti Luna remains very confused. Reportedly was somewhat more agitated overnight. Right hip bipolar completed yesterday. No new Ortho complaints per family. PT/OT:  
Gait:    
            
 
Vital Signs:   
Patient Vitals for the past 8 hrs: 
 BP Temp Pulse Resp SpO2  
19 0405 118/77 98.9 °F (37.2 °C) (!) 56 16 90 % Temp (24hrs), Av.5 °F (36.9 °C), Min:98 °F (36.7 °C), Max:98.9 °F (37.2 °C) Pain Control:  
Pain Assessment Pain Scale 1: Numeric (0 - 10) Pain Intensity 1: 0 Meds: 
 
Current Facility-Administered Medications Medication Dose Route Frequency  0.9% sodium chloride infusion  125 mL/hr IntraVENous CONTINUOUS  
 sodium chloride (NS) flush 5-10 mL  5-10 mL IntraVENous Q8H  
 sodium chloride (NS) flush 5-10 mL  5-10 mL IntraVENous PRN  
 naloxone (NARCAN) injection 0.4 mg  0.4 mg IntraVENous PRN  
 calcium-vitamin D (OS-CHERYL) 500 mg-200 unit tablet  1 Tab Oral TID WITH MEALS  senna-docusate (PERICOLACE) 8.6-50 mg per tablet 1 Tab  1 Tab Oral BID  polyethylene glycol (MIRALAX) packet 17 g  17 g Oral DAILY  [START ON 2019] bisacodyl (DULCOLAX) suppository 10 mg  10 mg Rectal DAILY PRN  
 ondansetron (ZOFRAN) injection 4 mg  4 mg IntraVENous Q4H PRN  
 enoxaparin (LOVENOX) injection 40 mg  40 mg SubCUTAneous DAILY  levothyroxine (SYNTHROID) tablet 150 mcg  150 mcg Oral ACB  cetirizine (ZYRTEC) tablet 10 mg  10 mg Oral DAILY  donepezil (ARICEPT) tablet 10 mg  10 mg Oral DAILY  memantine (NAMENDA) tablet 10 mg  10 mg Oral BID  pantoprazole (PROTONIX) tablet 40 mg  40 mg Oral ACB  QUEtiapine (SEROquel) tablet 25 mg  25 mg Oral BID  sertraline (ZOLOFT) tablet 100 mg  100 mg Oral DAILY  simvastatin (ZOCOR) tablet 40 mg  40 mg Oral QHS  tamsulosin (FLOMAX) capsule 0.4 mg  0.4 mg Oral DAILY  sodium chloride (NS) flush 5-10 mL  5-10 mL IntraVENous Q8H  
 sodium chloride (NS) flush 5-10 mL  5-10 mL IntraVENous PRN  
 dextrose 5 % - 0.45% NaCl infusion  75 mL/hr IntraVENous CONTINUOUS  
 acetaminophen (TYLENOL) tablet 650 mg  650 mg Oral Q4H PRN  
 ondansetron (ZOFRAN ODT) tablet 4 mg  4 mg Oral Q6H PRN  
 magnesium hydroxide (MILK OF MAGNESIA) 400 mg/5 mL oral suspension 30 mL  30 mL Oral DAILY PRN  
 morphine injection 1 mg  1 mg IntraVENous Q4H PRN  
 acetaminophen (TYLENOL) tablet 650 mg  650 mg Oral Q6H  
 traMADol (ULTRAM) tablet 50 mg  50 mg Oral Q6H PRN  
 HYDROcodone-acetaminophen (NORCO) 5-325 mg per tablet 1 Tab  1 Tab Oral Q4H PRN  
 
 
LAB:   
Recent Labs 01/01/19 
0500 12/31/18 
0254 HCT  --  40.8 HGB 11.7* 13.6 INR  --  1.1 Transfuse PRBC's:   
 
Assessment & Physician's Comment: 
Dressing is clean, dry, and intact Neurovascular checks within normal limits Orientation:  Disoriented (baseline) Active Problems: S/P right hip fracture (12/30/2018) Plan: 
 
Cont PT/OT - WBAT Tylenol for pain - careful with any narcotic medications Ice packs to hip PRN Lovenox for DVT prophylaxis Medical management per primary team 
Case management for disposition planning - higher level SNF? hSola Rainey PA-C [facility] [address] [place] [address]

## 2019-01-01 NOTE — PROGRESS NOTES
Bedside shift change report given to Anaya Sung (oncoming nurse) by Dolly Turner  (offgoing nurse). Report included the following information SBAR, Kardex, Intake/Output and MAR.

## 2019-01-01 NOTE — PROGRESS NOTES
Problem: Mobility Impaired (Adult and Pediatric) Goal: *Acute Goals and Plan of Care (Insert Text) Physical Therapy Goals Initiated 1/1/2019 1. Patient will move from supine to sit and sit to supine  in bed with moderate assistance  within 4 days. 2. Patient will perform sit to stand with moderate assistance  within 4 days. 3. Patient will ambulate with moderate assistance  for 15 feet with the least restrictive device within 4 days. 4. Patient will verbalize and demonstrate understanding of posterior hip precautions per protocol within 4 days. 6. Patient will perform hip home exercise program per protocol with moderate assistance  within 4 days. physical Therapy EVALUATION Patient: Misty Kang (28 y.o. male) Date: 1/1/2019 Primary Diagnosis: S/P right hip fracture Procedure(s) (LRB): 
RIGHT HIP HEMIARTHROPLASTY (Right) 1 Day Post-Op Precautions:   Bed Alarm, WBAT(posterior hip pecautions) ASSESSMENT : 
Based on the objective data described below, the patient presents with baseline dementia, combativeness this morning, right hip pain s/p right hip hemiarthroplasty after fall at SNF, impaired balance, and decreased activity tolerance limiting pt's safe functional mobility. Pt's son present during evaluation to provide history. At baseline, pt lives in a SNF and is ambulatory without AD per son. Pt requires assist for all ADLs due to Alzheimer's. Pt required max assist x 2 for bed mobility this date and c/o pain during movement. Once seated EOB, pt able to maintain static sitting balance with SBA/min assist at times. Pt slightly combative and c/o pain in sitting so standing not attempted this date. Will continue to follow while pt remains in hospital. Recommend SNF at discharge. Patient will benefit from skilled intervention to address the above impairments. Patients rehabilitation potential is considered to be Guarded Factors which may influence rehabilitation potential include: []         None noted 
[x]         Mental ability/status []         Medical condition 
[]         Home/family situation and support systems 
[x]         Safety awareness [x]         Pain tolerance/management 
[]         Other: PLAN : 
Recommendations and Planned Interventions: 
[x]           Bed Mobility Training             []    Neuromuscular Re-Education 
[x]           Transfer Training                   []    Orthotic/Prosthetic Training 
[x]           Gait Training                         []    Modalities [x]           Therapeutic Exercises           []    Edema Management/Control 
[x]           Therapeutic Activities            [x]    Patient and Family Training/Education 
[]           Other (comment): Frequency/Duration: Patient will be followed by physical therapy  daily to address goals. Discharge Recommendations: Westley Lozano Further Equipment Recommendations for Discharge: rolling walker SUBJECTIVE:  
Patient stated I can do it.  OBJECTIVE DATA SUMMARY:  
HISTORY:   
Past Medical History:  
Diagnosis Date  Dementia  Hypertension  Hypothyroidism  Pacemaker  Pituitary tumor  Sick sinus syndrome ([facility] [address] Past Surgical History:  
Procedure Laterality Date  HX PACEMAKER Prior Level of Function/Home Situation: needs assist for all ADLs due to dementia, ambulatory without AD at [facility] Personal factors and/or comorbidities impacting plan of care:  
 
Home Situation Home Environment: Skilled nursing facility Care Facility Name: [facility] One[facility]Two Story Residence: One story Living Alone: No 
Support Systems: Child(dee), Skilled nursing facility Patient Expects to be Discharged to[de-identified] Skilled nursing facility EXAMINATION/PRESENTATION/DECISION MAKING: Critical Behavior: 
Neurologic State: Alert, Confused Orientation Level: Disoriented to place, Disoriented to situation, Disoriented to time, Oriented to person Cognition: Decreased attention/concentration, Impaired decision making Safety/Judgement: Decreased awareness of need for assistance, Lack of insight into deficits Hearing: Auditory Auditory Impairment: Hard of hearing, bilateralSkin:  Dressing c/d/i right hip Edema: none Range Of Motion: 
AROM: Generally decreased, functional 
  
  
  
PROM: Generally decreased, functional 
  
  
  
Strength:   
Strength: Generally decreased, functional 
  
  
  
  
  
  
Tone & Sensation:  
Tone: Abnormal 
  
  
  
  
Sensation: Intact Coordination: 
Coordination: Generally decreased, functional 
Vision:  
  
Functional Mobility: 
Bed Mobility: 
  
Supine to Sit: Maximum assistance;Assist x2 Sit to Supine: Maximum assistance;Assist x2 Scooting: Total assistance;Assist x2 Transfers: 
Sit to Stand: (unable) Balance:  
Sitting: Impaired Sitting - Static: Fair (occasional) Sitting - Dynamic: Fair (occasional) Standing: (unable)Ambulation/Gait Training:  
  
  
  
Gait Description (WDL): (unable) Functional Measure: 
Barthel Index: 
 
Bathin Bladder: 0 Bowels: 5 Groomin Dressin Feedin Mobility: 0 Stairs: 0 Toilet Use: 0 Transfer (Bed to Chair and Back): 5 Total: 10 Barthel and G-code impairment scale: 
Percentage of impairment CH 
0% CI 
1-19% CJ 
20-39% CK 
40-59% CL 
60-79% CM 
80-99% CN 
100% Barthel Score 0-100 100 99-80 79-60 59-40 20-39 1-19 
 0 Barthel Score 0-20 20 17-19 13-16 9-12 5-8 1-4 0 The Barthel ADL Index: Guidelines 1. The index should be used as a record of what a patient does, not as a record of what a patient could do. 2. The main aim is to establish degree of independence from any help, physical or verbal, however minor and for whatever reason. 3. The need for supervision renders the patient not independent.  
4. A patient's performance should be established using the best available evidence. Asking the patient, friends/relatives and nurses are the usual sources, but direct observation and common sense are also important. However direct testing is not needed. 5. Usually the patient's performance over the preceding 24-48 hours is important, but occasionally longer periods will be relevant. 6. Middle categories imply that the patient supplies over 50 per cent of the effort. 7. Use of aids to be independent is allowed. Jordin Portillo., Barthel, ALPHONSE. (6291). Functional evaluation: the Barthel Index. [facility] (14)2. Lula Libman dustin AMIE Esposito, Bryanna Allen., Una Nugent., Marvin, [address] (1999). Measuring the change indisability after inpatient rehabilitation; comparison of the responsiveness of the Barthel Index and Functional [place] Measure. Journal of Neurology, Neurosurgery, and Psychiatry, 66(4), 116-807. URI Bella, LORIE Pillai, & Caitlin Brown MALEJO. (2004.) Assessment of post-stroke quality of life in cost-effectiveness studies: The usefulness of the Barthel Index and the EuroQoL-5D. [facility], 13, 437-08 G codes: In compliance with CMSs Claims Based Outcome Reporting, the following G-code set was chosen for this patient based on their primary functional limitation being treated: The outcome measure chosen to determine the severity of the functional limitation was the Barthel Index with a score of 10/100 which was correlated with the impairment scale. ? Mobility - Walking and Moving Around:  
  - CURRENT STATUS: CM - 80%-99% impaired, limited or restricted  - GOAL STATUS: CL - 60%-79% impaired, limited or restricted  - D/C STATUS:  ---------------To be determined--------------- Physical Therapy Evaluation Charge Determination History Examination Presentation Decision-Making MEDIUM  Complexity : 1-2 comorbidities / personal factors will impact the outcome/ POC  MEDIUM Complexity : 3 Standardized tests and measures addressing body structure, function, activity limitation and / or participation in recreation  MEDIUM Complexity : Evolving with changing characteristics  MEDIUM Complexity : FOTO score of 26-74 Based on the above components, the patient evaluation is determined to be of the following complexity level: MEDIUM Pain: 
Pain Scale 1: Numeric (0 - 10) Pain Intensity 1: 0 Activity Tolerance: VSS Please refer to the flowsheet for vital signs taken during this treatment. After treatment:  
[]         Patient left in no apparent distress sitting up in chair 
[x]         Patient left in no apparent distress in bed 
[x]         Call bell left within reach [x]         Nursing notified 
[x]         Caregiver present [x]         Bed alarm activated COMMUNICATION/EDUCATION:  
The patients plan of care was discussed with: Registered Nurse. [x]         Fall prevention education was provided and the patient/caregiver indicated understanding. []         Patient/family have participated as able in goal setting and plan of care. []         Patient/family agree to work toward stated goals and plan of care. []         Patient understands intent and goals of therapy, but is neutral about his/her participation. [x]         Patient is unable to participate in goal setting and plan of care. Thank you for this referral. 
Arnetta Alpers, PT Time Calculation: 20 mins

## 2019-01-02 LAB — HGB BLD-MCNC: 10.8 G/DL (ref 12.1–17)

## 2019-01-02 PROCEDURE — 65270000029 HC RM PRIVATE

## 2019-01-02 PROCEDURE — 74011250637 HC RX REV CODE- 250/637: Performed by: INTERNAL MEDICINE

## 2019-01-02 PROCEDURE — 74011250637 HC RX REV CODE- 250/637: Performed by: ORTHOPAEDIC SURGERY

## 2019-01-02 PROCEDURE — 85018 HEMOGLOBIN: CPT

## 2019-01-02 PROCEDURE — 94760 N-INVAS EAR/PLS OXIMETRY 1: CPT

## 2019-01-02 PROCEDURE — 74011250636 HC RX REV CODE- 250/636: Performed by: ORTHOPAEDIC SURGERY

## 2019-01-02 PROCEDURE — 97530 THERAPEUTIC ACTIVITIES: CPT

## 2019-01-02 PROCEDURE — 74011000250 HC RX REV CODE- 250: Performed by: ORTHOPAEDIC SURGERY

## 2019-01-02 PROCEDURE — 36415 COLL VENOUS BLD VENIPUNCTURE: CPT

## 2019-01-02 PROCEDURE — 74011000258 HC RX REV CODE- 258: Performed by: INTERNAL MEDICINE

## 2019-01-02 PROCEDURE — 74011250637 HC RX REV CODE- 250/637: Performed by: PHYSICIAN ASSISTANT

## 2019-01-02 RX ORDER — BACLOFEN 10 MG/1
5 TABLET ORAL
Status: DISCONTINUED | OUTPATIENT
Start: 2019-01-02 | End: 2019-01-03

## 2019-01-02 RX ORDER — LEVOTHYROXINE SODIUM 150 UG/1
125 TABLET ORAL
COMMUNITY

## 2019-01-02 RX ORDER — GUAIFENESIN 100 MG/5ML
81 LIQUID (ML) ORAL DAILY
COMMUNITY
End: 2019-01-08

## 2019-01-02 RX ORDER — OMEPRAZOLE 20 MG/1
20 CAPSULE, DELAYED RELEASE ORAL DAILY
Status: ON HOLD | COMMUNITY
End: 2019-01-08 | Stop reason: SDUPTHER

## 2019-01-02 RX ADMIN — MEMANTINE 10 MG: 10 TABLET ORAL at 18:02

## 2019-01-02 RX ADMIN — DEXTROSE MONOHYDRATE AND SODIUM CHLORIDE 75 ML/HR: 5; .45 INJECTION, SOLUTION INTRAVENOUS at 06:35

## 2019-01-02 RX ADMIN — PANTOPRAZOLE SODIUM 40 MG: 40 TABLET, DELAYED RELEASE ORAL at 09:22

## 2019-01-02 RX ADMIN — BACLOFEN 5 MG: 10 TABLET ORAL at 10:33

## 2019-01-02 RX ADMIN — LEVOTHYROXINE SODIUM 150 MCG: 150 TABLET ORAL at 09:22

## 2019-01-02 RX ADMIN — ACETAMINOPHEN 650 MG: 325 TABLET ORAL at 18:02

## 2019-01-02 RX ADMIN — TAMSULOSIN HYDROCHLORIDE 0.4 MG: 0.4 CAPSULE ORAL at 10:33

## 2019-01-02 RX ADMIN — STANDARDIZED SENNA CONCENTRATE AND DOCUSATE SODIUM 1 TABLET: 8.6; 5 TABLET, FILM COATED ORAL at 18:02

## 2019-01-02 RX ADMIN — SIMVASTATIN 40 MG: 40 TABLET, FILM COATED ORAL at 21:13

## 2019-01-02 RX ADMIN — POLYETHYLENE GLYCOL 3350 17 G: 17 POWDER, FOR SOLUTION ORAL at 10:33

## 2019-01-02 RX ADMIN — MEMANTINE 10 MG: 10 TABLET ORAL at 10:33

## 2019-01-02 RX ADMIN — Medication 5 ML: at 14:00

## 2019-01-02 RX ADMIN — ENOXAPARIN SODIUM 40 MG: 40 INJECTION SUBCUTANEOUS at 10:33

## 2019-01-02 RX ADMIN — BACLOFEN 5 MG: 10 TABLET ORAL at 18:01

## 2019-01-02 RX ADMIN — QUETIAPINE FUMARATE 25 MG: 25 TABLET ORAL at 10:33

## 2019-01-02 RX ADMIN — CALCIUM CARBONATE-VITAMIN D TAB 500 MG-200 UNIT 1 TABLET: 500-200 TAB at 09:22

## 2019-01-02 RX ADMIN — TRAMADOL HYDROCHLORIDE 50 MG: 50 TABLET, FILM COATED ORAL at 19:23

## 2019-01-02 RX ADMIN — ACETAMINOPHEN 650 MG: 325 TABLET ORAL at 23:45

## 2019-01-02 RX ADMIN — STANDARDIZED SENNA CONCENTRATE AND DOCUSATE SODIUM 1 TABLET: 8.6; 5 TABLET, FILM COATED ORAL at 10:33

## 2019-01-02 RX ADMIN — QUETIAPINE FUMARATE 25 MG: 25 TABLET ORAL at 18:02

## 2019-01-02 RX ADMIN — CALCIUM CARBONATE-VITAMIN D TAB 500 MG-200 UNIT 1 TABLET: 500-200 TAB at 18:02

## 2019-01-02 RX ADMIN — ACETAMINOPHEN 650 MG: 325 TABLET ORAL at 12:39

## 2019-01-02 RX ADMIN — CETIRIZINE HYDROCHLORIDE 10 MG: 10 TABLET, FILM COATED ORAL at 10:33

## 2019-01-02 RX ADMIN — CALCIUM CARBONATE-VITAMIN D TAB 500 MG-200 UNIT 1 TABLET: 500-200 TAB at 12:39

## 2019-01-02 RX ADMIN — DEXTROSE MONOHYDRATE AND SODIUM CHLORIDE 75 ML/HR: 5; .45 INJECTION, SOLUTION INTRAVENOUS at 19:24

## 2019-01-02 RX ADMIN — DONEPEZIL HYDROCHLORIDE 10 MG: 10 TABLET ORAL at 10:33

## 2019-01-02 RX ADMIN — SERTRALINE HYDROCHLORIDE 100 MG: 50 TABLET ORAL at 10:33

## 2019-01-02 NOTE — PROGRESS NOTES
Admission Medication Reconciliation: 
 
Information obtained from:  Patient's daughter (ER RN here at [facility] Comments/Recommendations: Updated PTA meds/reviewed patient's allergies. 1)  Of note, patient takes baclofen for intractable hiccups and after several doses they danish and therapy is held. Patient has not responded to Thorazine or Compazine in the past. 2)  Medication changes (since last review): Added 
- None Adjusted - ASA 81 DR to chewable formulation - Simvastatin to daily vs QHS Removed - Keflex from Oct 2018 for UTI 
- Percocet from 2016 
- Albuterol HFA and Spacer - Lidocaine patch 3)  Confirmed allergy to Exelon patch Allergies:  Exelon [rivastigmine] Significant PMH/Disease States:  
Past Medical History:  
Diagnosis Date  Dementia  Hypertension  Hypothyroidism  Pacemaker  Pituitary tumor  Sick sinus syndrome ([facility] [address] Chief Complaint for this Admission: Chief Complaint Patient presents with  Fall Prior to Admission Medications:  
Prior to Admission Medications Prescriptions Last Dose Informant Patient Reported? Taking? QUEtiapine (SEROQUEL) 25 mg tablet   Yes Yes Sig: Take 25 mg by mouth two (2) times a day. aspirin 81 mg chewable tablet   Yes Yes Sig: Take 81 mg by mouth daily. baclofen (LIORESAL) 10 mg tablet   No Yes Sig: Take 0.5 Tabs by mouth three (3) times daily as needed. cetirizine (ZYRTEC) 10 mg tablet   Yes Yes Sig: Take 10 mg by mouth. donepezil (ARICEPT) 10 mg tablet  at AM  Yes Yes Sig: Take 10 mg by mouth daily. levothyroxine (SYNTHROID) 150 mcg tablet   Yes Yes Sig: Take 150 mcg by mouth Daily (before breakfast). memantine ER (NAMENDA XR) 28 mg capsule   Yes Yes Sig: Take 28 mg by mouth daily. omeprazole (PRILOSEC) 20 mg capsule   Yes Yes Sig: Take 20 mg by mouth daily. sertraline (ZOLOFT) 100 mg tablet   Yes Yes Sig: Take 100 mg by mouth daily. simvastatin (ZOCOR) 40 mg tablet   Yes Yes Sig: Take 40 mg by mouth daily. tamsulosin (FLOMAX) 0.4 mg capsule   Yes Yes Sig: Take 0.4 mg by mouth daily. Facility-Administered Medications: None

## 2019-01-02 NOTE — PROGRESS NOTES
Bedside and Verbal shift change report given to Charley Cochran (oncoming nurse) by Tanna Lea (offgoing nurse). Report included the following information SBAR, Kardex, Intake/Output and MAR.

## 2019-01-02 NOTE — PROGRESS NOTES
Bedside shift change report given to Sophy (oncoming nurse) by Mercedez Eckert (offgoing nurse). Report included the following information SBAR, Kardex, Intake/Output and MAR.

## 2019-01-02 NOTE — PROGRESS NOTES
Care Management Interventions PCP Verified by CM: Yes 
Palliative Care Criteria Met (RRAT>21 & CHF Dx)?: No 
Mode of Transport at Discharge: BLS MyChart Signup: No 
Discharge Durable Medical Equipment: No 
Health Maintenance Reviewed: Yes Physical Therapy Consult: Yes Occupational Therapy Consult: Yes Speech Therapy Consult: No 
Current Support Network: Assisted Living Confirm Follow Up Transport: Family Plan discussed with Pt/Family/Caregiver: Yes Freedom of Choice Offered: Yes [employer] Information Provided?: No 
Discharge Location Discharge Placement: Assisted Living Reason for Admission:   Right hip fracture s/p fall RRAT Score:     5 Plan for utilizing home health:      Not indicated, patient will return to halfway Likelihood of Readmission:  low Transition of Care Plan:      Cm was asked to meet with patients' family per their request. Cm met with daughter Erick Washington) who is an RN with [employer], and patients' son, Ramana Vaz, who lives in [facility]. They confirmed that patient was currently residing at [address] [place] [address] ([phone]) on [address] [place] [address], which is an all memory care unit. His stay is being paid for by his LTC insurance, and the family was inquiring as to whether Medicare would help cover the costs, now that he is in the hospital. Cm advised them that Medicare would not. Family is hopeful that he can return to the facility with home health/additional assistance from the staff. Sushila Benítez is the [employer] is the [id number] . Cm informed them that we would have to call [place] to see if patient is appropriate to return. Will follow.               
[employer] , [place]

## 2019-01-02 NOTE — PROGRESS NOTES
Chart reviewed. The patient was discussed in rounds. The patient is needing total assist with mobility and adls. CRM will call [facility] to find out if they would be able to handle this patient in their facility. The patient resides in the memory care. CRM LVM for Claudio Tomas the Residential Care Director, transferred from the [facility] at [address] [facility] [address] # [phone]. KJT 
 
12:20pm CRM met with Claudio Tomas [phone] and cell [phone] and [place] from [place] [facility], the daughter Marychuy Haro [phone] and patient in the room. The facility will be able to provide max care for this patient for mobility and ADLS. They have PT and OT on sight. CRM will follow along to update them/follow patient's progress. CRM is thinking discharge no earlier than Friday. The patient is one day behind with therapy due to the holiday.  [facility]

## 2019-01-02 NOTE — PROGRESS NOTES
Hospitalist Progress Note Melisa Burns MD 
Call  Date of Service:  2019 NAME:  Dusty Hull :  1940 MRN:  [id number] Admission Summary: S/p fall and right hip fracture Interval history / Subjective: No complaint. Physical therapy working with patient. Patient's daughter in the room visiting. Assessment & Plan: Hx of fall and right hip fracture s/p right hemiarthroplasty by ortho service 
-no complaints of pain 
-on pain and bowel regimen as guided by ortho service. -PT/OT working with patient 
-Post-op lab reviewed  
-Will need high level SNF, working on it History of pacemaker placement for sick sinus syndrome without any battery life in the past year. 2018 hospitalist note: \"Patient's cardiologist aware. No dysrhythmias noted. Fairly well controlled primary hypertension. \" Hiccups  
-Received compazine yesterday and not having hiccups today Hx of dyslipidemia/hyperlipidemia 
-on statin Hx of hypothyroidism -on replacement History of a pituitary tumor. Probable advanced dementia without any current behavioral disturbances.   
-on aricpet and memantine and other home meds 2018 hospitalist note: \"Directives. DDNR on the record. Discussed with the patient's daughter, power of . \" 
 
DVT prophylaxis: lovenox, as per ortho recs Care Plan discussed with:patient/staff - discussed with daughter who is a nurse here. Disposition:SNF - arranging. Hospital Problems  Never Reviewed Codes Class Noted POA  
 S/P right hip fracture ICD-10-CM: Z87.81 ICD-9-CM: V15.51  2018 Unknown Review of Systems:  
Unable to obtain due to current state, dementia Vital Signs:  
 Last 24hrs VS reviewed since prior progress note. Most recent are: 
Visit Vitals /84 Pulse 72 Temp 99 °F (37.2 °C) Resp 18 Ht 5' 7\" (1.702 m) SpO2 96% BMI 29.52 kg/m² Intake/Output Summary (Last 24 hours) at 1/2/2019 3005 Last data filed at 1/2/2019 0828 Gross per 24 hour Intake 940 ml Output 900 ml Net 40 ml Physical Examination:  
 
 
     
Constitutional:  No acute distress, pleasant   
ENT:  Neck supple, Resp:  No wheezing/rhonchi/rales. No accessory muscle use CV:  Regular rhythm, normal rate, no gallops or rubs GI:  Soft, non distended, non tender. normoactive bowel sounds Musculoskeletal:  No edema, pulses present, no cyanosis Neurologic:  follows command, tracks with eyes, flat affect, alert but not oriented Data Review:  
 
 
 
Labs:  
 
Recent Labs 01/02/19 
0510 01/01/19 
0500 12/31/18 
0254 12/30/18 2022 WBC  --   --  10.5 8.1 HGB 10.8* 11.7* 13.6 14.0  
HCT  --   --  40.8 42.6 PLT  --   --  170 182 Recent Labs 01/01/19 
0500 12/31/18 
0254 12/30/18 2022  139 143  
K 3.9 3.7 3.8 * 108 109* CO2 22 26 30 BUN 15 13 16 CREA 0.89 0.77 1.00 * 139* 106* CA 7.6* 7.8* 8.1* Recent Labs 12/30/18 2022 SGOT 25 ALT 32 * TBILI 0.5 TP 6.7 ALB 3.8 GLOB 2.9 Recent Labs 12/31/18 
0254 12/30/18 2022 INR 1.1 1.1 PTP 11.3* 10.9 APTT  --  23.3 Lab Results Component Value Date/Time  Color DARK YELLOW 12/31/2018 06:38 AM  
 Appearance CLEAR 12/31/2018 06:38 AM  
 Specific gravity 1.029 12/31/2018 06:38 AM  
 Specific gravity 1.015 10/22/2018 07:03 PM  
 pH (UA) 6.0 12/31/2018 06:38 AM  
 Protein 30 (A) 12/31/2018 06:38 AM  
 Glucose NEGATIVE  12/31/2018 06:38 AM  
 Ketone TRACE (A) 12/31/2018 06:38 AM  
 Bilirubin NEGATIVE  10/19/2018 04:50 PM  
 Urobilinogen 1.0 12/31/2018 06:38 AM  
 Nitrites NEGATIVE  12/31/2018 06:38 AM  
 Leukocyte Esterase NEGATIVE  12/31/2018 06:38 AM  
 Epithelial cells MODERATE (A) 12/31/2018 06:38 AM  
 Bacteria NEGATIVE  12/31/2018 06:38 AM  
 WBC 0-4 12/31/2018 06:38 AM  
 RBC 0-5 12/31/2018 06:38 AM  
 
 
 
Medications Reviewed:  
 
______________________________________________________________________ EXPECTED LENGTH OF STAY: 2d 4h 
ACTUAL LENGTH OF STAY:          3 Ashlie Renae MD

## 2019-01-02 NOTE — PROGRESS NOTES
Ortho Daily Progress Note Patient: Tevin Dugan                   MRN: [id number]  Sex: male YOB: 1940           Age: 77 y.o. 
 
2 Days Post-Op Procedure(s): RIGHT HIP HEMIARTHROPLASTY Visit Vitals /74 (BP 1 Location: Left arm, BP Patient Position: At rest) Pulse 69 Temp 98.8 °F (37.1 °C) Resp 18 Ht 5' 7\" (1.702 m) SpO2 95% BMI 29.52 kg/m² Lab Results: HGB Date/Time Value Ref Range Status 01/02/2019 05:10 AM 10.8 (L) 12.1 - 17.0 g/dL Final  
 
INR Date/Time Value Ref Range Status 12/31/2018 02:54 AM 1.1 0.9 - 1.1   Final  
  Comment:  
  A single therapeutic range for Vit K antagonists may not be optimal for all indications - see June, 2008 issue of Chest, American College of Chest Physicians Evidence-Based Clinical Practice Guidelines, 8th Edition. Physical Exam: 
 
DRESSING: Aquacel in place SWELLING: mild NEUROLOGICAL: intact PULSE:yes GENERAL:  Confused, dementia MOTION: Normal right hip ROM with minimal pain DVT Exam: No evidence of DVT seen on physical exam. 
 
 
Plan: DVT prophylaxisLovenox Weight bearing restrictionWBAT Pain Control:stable, mild-to-moderate joint symptoms intermittently, reasonably well controlled by current meds Dispo: SNF placement pending [address] [facility] [place] 
1/2/2019  
3:05 PM 
 
 
.

## 2019-01-02 NOTE — PROGRESS NOTES
Problem: Mobility Impaired (Adult and Pediatric) Goal: *Acute Goals and Plan of Care (Insert Text) Physical Therapy Goals Initiated 1/1/2019 1. Patient will move from supine to sit and sit to supine  in bed with moderate assistance  within 4 days. 2. Patient will perform sit to stand with moderate assistance  within 4 days. 3. Patient will ambulate with moderate assistance  for 15 feet with the least restrictive device within 4 days. 4. Patient will verbalize and demonstrate understanding of posterior hip precautions per protocol within 4 days. 6. Patient will perform hip home exercise program per protocol with moderate assistance  within 4 days. physical Therapy TREATMENT Patient: Dusty Hull (2 y.o. male) Date: 1/2/2019 Diagnosis: S/P right hip fracture <principal problem not specified> Procedure(s) (LRB): 
RIGHT HIP HEMIARTHROPLASTY (Right) 2 Days Post-Op Precautions: Fall, Bed Alarm, WBAT, Total hip(posterior THR precautions) Chart, physical therapy assessment, plan of care and goals were reviewed. ASSESSMENT: 
Patient continues to be mildly agitated when in pain. He was able to be moved to EOB with total assist and then maintained sitting balance with intermittent assistance. He was able to stand with max assist to a partial standing position, but not able to maintain and minimal weight bearing on the RLE due to pain. Note hiccups throughout session, as well. He was positioned in chair position in the bed after and discussed with family utilizing this option for meals to facilitate him feeding himself as he was doing prior to this fall. If [facility] is able to provide total assist for all transfers, he could return there with HHPT, but more likely he will need SNF level rehab prior to return to [facility]. Progression toward goals: 
[]    Improving appropriately and progressing toward goals [x]    Improving slowly and progressing toward goals []    Not making progress toward goals and plan of care will be adjusted PLAN: 
Patient continues to benefit from skilled intervention to address the above impairments. Continue treatment per established plan of care. Discharge Recommendations:  Westley Lozano Further Equipment Recommendations for Discharge:  none SUBJECTIVE:  
Patient stated What, why?. OBJECTIVE DATA SUMMARY:  
Critical Behavior: 
Neurologic State: Confused Orientation Level: Disoriented to place, Disoriented to situation, Disoriented to time Cognition: Decreased attention/concentration, Impaired decision making, Follows commands Safety/Judgement: Decreased awareness of environment, Decreased awareness of need for assistance, Decreased awareness of need for safety, Decreased insight into deficits Functional Mobility Training: 
Bed Mobility: 
  
Supine to Sit: Total assistance Sit to Supine: Total assistance Scooting: Total assistance Transfers: 
Sit to Stand: Maximum assistance Stand to Sit: Maximum assistance Balance: 
Sitting: Impaired; Without support Sitting - Static: Fair (occasional) Sitting - Dynamic: Fair (occasional) Standing: Impaired; With support Standing - Static: Poor Standing - Dynamic : PoorAmbulation/Gait Training: 
  
  
  
  
  
  
  
  
  
  
  
  
  
  
  
  
  
  
Stairs: 
  
  
   
 
Neuro Re-Education: 
 
Therapeutic Exercises:  
 
Pain: 
Pain Scale 1: Visual 
Pain Intensity 1: 0 Activity Tolerance:  
Limited by pain and dementia Please refer to the flowsheet for vital signs taken during this treatment. After treatment:  
[]    Patient left in no apparent distress sitting up in chair 
[x]    Patient left in no apparent distress in bed in chair position 
[x]    Call bell left within reach [x]    Nursing notified 
[x]    Caregiver present [x]    Bed alarm activated COMMUNICATION/COLLABORATION:  
 The patients plan of care was discussed with: Registered Nurse and  Francisca Romero, PT Time Calculation: 30 mins

## 2019-01-03 PROCEDURE — 74011250637 HC RX REV CODE- 250/637: Performed by: INTERNAL MEDICINE

## 2019-01-03 PROCEDURE — 74011000258 HC RX REV CODE- 258: Performed by: INTERNAL MEDICINE

## 2019-01-03 PROCEDURE — 97530 THERAPEUTIC ACTIVITIES: CPT

## 2019-01-03 PROCEDURE — 65270000029 HC RM PRIVATE

## 2019-01-03 PROCEDURE — 74011250636 HC RX REV CODE- 250/636: Performed by: ORTHOPAEDIC SURGERY

## 2019-01-03 PROCEDURE — 94760 N-INVAS EAR/PLS OXIMETRY 1: CPT

## 2019-01-03 PROCEDURE — 74011250637 HC RX REV CODE- 250/637: Performed by: ORTHOPAEDIC SURGERY

## 2019-01-03 PROCEDURE — 74011250637 HC RX REV CODE- 250/637: Performed by: PHYSICIAN ASSISTANT

## 2019-01-03 PROCEDURE — 74011000250 HC RX REV CODE- 250: Performed by: ORTHOPAEDIC SURGERY

## 2019-01-03 RX ORDER — BACLOFEN 10 MG/1
5 TABLET ORAL EVERY 6 HOURS
Status: DISCONTINUED | OUTPATIENT
Start: 2019-01-03 | End: 2019-01-08 | Stop reason: HOSPADM

## 2019-01-03 RX ADMIN — STANDARDIZED SENNA CONCENTRATE AND DOCUSATE SODIUM 1 TABLET: 8.6; 5 TABLET, FILM COATED ORAL at 09:07

## 2019-01-03 RX ADMIN — MEMANTINE 10 MG: 10 TABLET ORAL at 09:07

## 2019-01-03 RX ADMIN — ACETAMINOPHEN 650 MG: 325 TABLET ORAL at 12:00

## 2019-01-03 RX ADMIN — BACLOFEN 5 MG: 10 TABLET ORAL at 11:36

## 2019-01-03 RX ADMIN — Medication 10 ML: at 21:12

## 2019-01-03 RX ADMIN — ACETAMINOPHEN 650 MG: 325 TABLET ORAL at 17:32

## 2019-01-03 RX ADMIN — ENOXAPARIN SODIUM 40 MG: 40 INJECTION SUBCUTANEOUS at 09:07

## 2019-01-03 RX ADMIN — SIMVASTATIN 40 MG: 40 TABLET, FILM COATED ORAL at 21:12

## 2019-01-03 RX ADMIN — SERTRALINE HYDROCHLORIDE 100 MG: 50 TABLET ORAL at 09:07

## 2019-01-03 RX ADMIN — CALCIUM CARBONATE-VITAMIN D TAB 500 MG-200 UNIT 1 TABLET: 500-200 TAB at 11:36

## 2019-01-03 RX ADMIN — POLYETHYLENE GLYCOL 3350 17 G: 17 POWDER, FOR SOLUTION ORAL at 09:06

## 2019-01-03 RX ADMIN — QUETIAPINE FUMARATE 25 MG: 25 TABLET ORAL at 09:07

## 2019-01-03 RX ADMIN — DONEPEZIL HYDROCHLORIDE 10 MG: 10 TABLET ORAL at 09:07

## 2019-01-03 RX ADMIN — STANDARDIZED SENNA CONCENTRATE AND DOCUSATE SODIUM 1 TABLET: 8.6; 5 TABLET, FILM COATED ORAL at 17:32

## 2019-01-03 RX ADMIN — ACETAMINOPHEN 650 MG: 325 TABLET ORAL at 07:27

## 2019-01-03 RX ADMIN — DEXTROSE MONOHYDRATE AND SODIUM CHLORIDE 75 ML/HR: 5; .45 INJECTION, SOLUTION INTRAVENOUS at 07:55

## 2019-01-03 RX ADMIN — TAMSULOSIN HYDROCHLORIDE 0.4 MG: 0.4 CAPSULE ORAL at 09:07

## 2019-01-03 RX ADMIN — PANTOPRAZOLE SODIUM 40 MG: 40 TABLET, DELAYED RELEASE ORAL at 07:26

## 2019-01-03 RX ADMIN — CALCIUM CARBONATE-VITAMIN D TAB 500 MG-200 UNIT 1 TABLET: 500-200 TAB at 17:32

## 2019-01-03 RX ADMIN — LEVOTHYROXINE SODIUM 150 MCG: 150 TABLET ORAL at 07:26

## 2019-01-03 RX ADMIN — BACLOFEN 5 MG: 10 TABLET ORAL at 17:32

## 2019-01-03 RX ADMIN — CETIRIZINE HYDROCHLORIDE 10 MG: 10 TABLET, FILM COATED ORAL at 09:07

## 2019-01-03 RX ADMIN — TRAMADOL HYDROCHLORIDE 50 MG: 50 TABLET, FILM COATED ORAL at 07:26

## 2019-01-03 RX ADMIN — QUETIAPINE FUMARATE 25 MG: 25 TABLET ORAL at 17:32

## 2019-01-03 RX ADMIN — BACLOFEN 5 MG: 10 TABLET ORAL at 02:47

## 2019-01-03 RX ADMIN — CALCIUM CARBONATE-VITAMIN D TAB 500 MG-200 UNIT 1 TABLET: 500-200 TAB at 09:07

## 2019-01-03 RX ADMIN — MEMANTINE 10 MG: 10 TABLET ORAL at 17:32

## 2019-01-03 NOTE — PROGRESS NOTES
Bedside and Verbal shift change report given to DANYELL Cano (oncoming nurse) by Aggie Farah RN (offgoing nurse). Report included the following information SBAR.

## 2019-01-03 NOTE — PROGRESS NOTES
Problem: Mobility Impaired (Adult and Pediatric) Goal: *Acute Goals and Plan of Care (Insert Text) Physical Therapy Goals Initiated 1/1/2019 1. Patient will move from supine to sit and sit to supine  in bed with moderate assistance  within 4 days. 2. Patient will perform sit to stand with moderate assistance  within 4 days. 3. Patient will ambulate with moderate assistance  for 15 feet with the least restrictive device within 4 days. 4. Patient will verbalize and demonstrate understanding of posterior hip precautions per protocol within 4 days. 6. Patient will perform hip home exercise program per protocol with moderate assistance  within 4 days. physical Therapy TREATMENT Patient: Terrance Melendez (53 y.o. male) Date: 1/3/2019 Diagnosis: S/P right hip fracture <principal problem not specified> Procedure(s) (LRB): 
RIGHT HIP HEMIARTHROPLASTY (Right) 3 Days Post-Op Precautions: Fall, Bed Alarm, WBAT, Total hip(posterior THR precautions) Chart, physical therapy assessment, plan of care and goals were reviewed. ASSESSMENT: 
Patient continues to be agitated with mobility attempts due to pain. He was able to move to the EOB with total assist but was somewhat better able to maintain sitting balance at the EOB today. He needed total assist of 2 to get to standing and to maintain standing with the walker. He was able to stand for a few moments with the walker to change his bed, but leans heavily to the L and posteriorly. Progression toward goals: 
[]    Improving appropriately and progressing toward goals [x]    Improving slowly and progressing toward goals 
[]    Not making progress toward goals and plan of care will be adjusted PLAN: 
Patient continues to benefit from skilled intervention to address the above impairments. Continue treatment per established plan of care. Discharge Recommendations:  Plan to return to [facility] with total assist 
Further Equipment Recommendations for Discharge:  none SUBJECTIVE:  
Patient stated Nyla Mcelroy do you want? Ellen Verdugo OBJECTIVE DATA SUMMARY:  
Critical Behavior: 
Neurologic State: Alert, Confused Orientation Level: Oriented to person, Disoriented to place, Disoriented to situation, Disoriented to time Cognition: Decreased command following, Decreased attention/concentration, Impaired decision making, Impulsive, Memory loss, Poor safety awareness Safety/Judgement: Decreased awareness of environment, Decreased awareness of need for assistance, Decreased awareness of need for safety, Decreased insight into deficits Functional Mobility Training: 
Bed Mobility: 
  
Supine to Sit: Total assistance Sit to Supine: Total assistance Transfers: 
Sit to Stand: Maximum assistance;Assist x2;Adaptive equipment; Additional time Stand to Sit: Maximum assistance; Adaptive equipment; Additional time;Assist x2 Balance: 
Sitting: Impaired; With support Sitting - Static: Poor (constant support)(leans to the L to unweught R hip) Sitting - Dynamic: Poor (constant support) Standing: Impaired; With support Standing - Static: Constant support(max assist of 2 to maintain standing) Standing - Dynamic : PoorAmbulation/Gait Training: 
  
  
  
  
  
  
  
  
  
  
  
  
  
  
  
  
  
  
Stairs: 
  
  
   
 
Neuro Re-Education: 
 
Therapeutic Exercises:  
 
Pain: 
Pain Scale 1: Adult Nonverbal Pain Scale Activity Tolerance:  
Poor Please refer to the flowsheet for vital signs taken during this treatment. After treatment:  
[]    Patient left in no apparent distress sitting up in chair 
[x]    Patient left in no apparent distress in bed 
[x]    Call bell left within reach [x]    Nursing notified 
[x]    Caregiver present [x]    Bed alarm activated COMMUNICATION/COLLABORATION:  
The patients plan of care was discussed with: Registered Nurse and  Vincenzo Dupont, PT Time Calculation: 13 mins

## 2019-01-03 NOTE — PROGRESS NOTES
Chart reviewed. SIMONE spoke with Gabby Berger with [facility] [phone], provided therapy updates and faxed updated medicals to [phone]. Gabby Berger would prefer that the patient return on Monday of next week. .They feel that the patient will need additional therapy tomorrow and Sat. CRM will discuss with Dr. Jossue CUEVA

## 2019-01-03 NOTE — PROGRESS NOTES
Ortho: 
Sleeping. Son in law at bedside. Sounds like patient sat on side of bed but unable to stand thus far. T 98.5 
NAD. Right hip dressing CDI. Thigh compartments soft. No pain gentle PROM right hip. Moves ankles OK. Calf NT bilat. Hgb 1/2 10.8 Imp: POD #3 Right deysi-arthroplasty Plan: 1. PT/OT WBAT, posterior approach precautions. 2. Lovenox 3. Analgesics 4. Disp planning.  
 
ISMAEL Nuñez

## 2019-01-03 NOTE — PROGRESS NOTES
Hospitalist Progress Note Roxanne Amador MD 
Call  Date of Service:  1/3/2019 NAME:  Nadine Fuller :  1940 MRN:  [id number] Admission Summary: S/p fall and right hip fracture Interval history / Subjective: No complaint. Physical therapy working with patient. Patient's son in law in the room visiting. Patient having hiccups and son in law asking if the frequency of baclofen can be adjusted to q 6 hrs. Assessment & Plan: Hx of fall and right hip fracture s/p right hemiarthroplasty by ortho service 
-no complaints of pain 
-on pain and bowel regimen as guided by ortho service. -PT/OT working with patient 
-Post-op lab reviewed  
-Goal is Rehab/SNF tomorrow after working with PT today, working on it History of pacemaker placement for sick sinus syndrome without any battery life in the past year. 2018 hospitalist note: \"Patient's cardiologist aware. No dysrhythmias noted. Fairly well controlled primary hypertension. \" Hiccups on 19 
-Received compazine 
-Family has reported that patient has had hiccups before and has responded well to baclofen. Hx of dyslipidemia/hyperlipidemia 
-on statin Hx of hypothyroidism -on replacement History of a pituitary tumor. Probable advanced dementia without any current behavioral disturbances.   
-on aricpet and memantine and other home meds 2018 hospitalist note: \"Directives. DDNR on the record. Discussed with the patient's daughter, power of . \" 
 
DVT prophylaxis: lovenox, as per ortho recs Care Plan discussed with:patient/staff - discussed with daughter who is a nurse here. Disposition:SNF - arranging. Hospital Problems  Never Reviewed Codes Class Noted POA  
 S/P right hip fracture ICD-10-CM: Z87.81 ICD-9-CM: V15.51  2018 Unknown Review of Systems: Unable to obtain due to current state, dementia Vital Signs:  
 Last 24hrs VS reviewed since prior progress note. Most recent are: 
Visit Vitals /90 (BP Patient Position: At rest) Pulse 78 Temp 98.5 °F (36.9 °C) Resp 18 Ht 5' 7\" (1.702 m) Wt 91.4 kg (201 lb 8 oz) SpO2 97% BMI 31.56 kg/m² Intake/Output Summary (Last 24 hours) at 1/3/2019 0945 Last data filed at 1/2/2019 1443 Gross per 24 hour Intake 840 ml Output 500 ml Net 340 ml Physical Examination:  
 
 
     
Constitutional:  No acute distress, pleasant   
ENT:  Neck supple, Resp:  No wheezing/rhonchi/rales. No accessory muscle use CV:  Regular rhythm, normal rate, no gallops or rubs GI:  Soft, non distended, non tender. normoactive bowel sounds Musculoskeletal:  No edema, pulses present, no cyanosis Neurologic:  follows command, tracks with eyes, flat affect, alert but not oriented Data Review:  
 
 
 
Labs:  
 
Recent Labs 01/02/19 
0510 01/01/19 
0500 HGB 10.8* 11.7* Recent Labs 01/01/19 
0500   
K 3.9 * CO2 22 BUN 15  
CREA 0.89 * CA 7.6* No results for input(s): SGOT, GPT, ALT, AP, TBIL, TBILI, TP, ALB, GLOB, GGT, AML, LPSE in the last 72 hours. No lab exists for component: AMYP, HLPSE No results for input(s): INR, PTP, APTT in the last 72 hours. No lab exists for component: INREXT, INREXT Lab Results Component Value Date/Time  Color DARK YELLOW 12/31/2018 06:38 AM  
 Appearance CLEAR 12/31/2018 06:38 AM  
 Specific gravity 1.029 12/31/2018 06:38 AM  
 Specific gravity 1.015 10/22/2018 07:03 PM  
 pH (UA) 6.0 12/31/2018 06:38 AM  
 Protein 30 (A) 12/31/2018 06:38 AM  
 Glucose NEGATIVE  12/31/2018 06:38 AM  
 Ketone TRACE (A) 12/31/2018 06:38 AM  
 Bilirubin NEGATIVE  10/19/2018 04:50 PM  
 Urobilinogen 1.0 12/31/2018 06:38 AM  
 Nitrites NEGATIVE  12/31/2018 06:38 AM  
 Leukocyte Esterase NEGATIVE  12/31/2018 06:38 AM  
 Epithelial cells MODERATE (A) 12/31/2018 06:38 AM  
 Bacteria NEGATIVE  12/31/2018 06:38 AM  
 WBC 0-4 12/31/2018 06:38 AM  
 RBC 0-5 12/31/2018 06:38 AM  
 
 
 
Medications Reviewed:  
 
______________________________________________________________________ EXPECTED LENGTH OF STAY: 2d 4h 
ACTUAL LENGTH OF STAY:          4 Letty Iqbal MD

## 2019-01-04 PROCEDURE — 97530 THERAPEUTIC ACTIVITIES: CPT

## 2019-01-04 PROCEDURE — 74011250636 HC RX REV CODE- 250/636: Performed by: ORTHOPAEDIC SURGERY

## 2019-01-04 PROCEDURE — 74011250637 HC RX REV CODE- 250/637: Performed by: INTERNAL MEDICINE

## 2019-01-04 PROCEDURE — 65270000029 HC RM PRIVATE

## 2019-01-04 PROCEDURE — 74011000258 HC RX REV CODE- 258: Performed by: INTERNAL MEDICINE

## 2019-01-04 PROCEDURE — 74011250637 HC RX REV CODE- 250/637: Performed by: PHYSICIAN ASSISTANT

## 2019-01-04 PROCEDURE — 74011000250 HC RX REV CODE- 250: Performed by: ORTHOPAEDIC SURGERY

## 2019-01-04 PROCEDURE — 74011250637 HC RX REV CODE- 250/637: Performed by: ORTHOPAEDIC SURGERY

## 2019-01-04 PROCEDURE — 94760 N-INVAS EAR/PLS OXIMETRY 1: CPT

## 2019-01-04 RX ADMIN — TAMSULOSIN HYDROCHLORIDE 0.4 MG: 0.4 CAPSULE ORAL at 08:41

## 2019-01-04 RX ADMIN — DEXTROSE MONOHYDRATE AND SODIUM CHLORIDE 75 ML/HR: 5; .45 INJECTION, SOLUTION INTRAVENOUS at 10:39

## 2019-01-04 RX ADMIN — SERTRALINE HYDROCHLORIDE 100 MG: 50 TABLET ORAL at 08:40

## 2019-01-04 RX ADMIN — ACETAMINOPHEN 650 MG: 325 TABLET ORAL at 00:34

## 2019-01-04 RX ADMIN — MEMANTINE 10 MG: 10 TABLET ORAL at 08:40

## 2019-01-04 RX ADMIN — LEVOTHYROXINE SODIUM 150 MCG: 150 TABLET ORAL at 07:03

## 2019-01-04 RX ADMIN — SIMVASTATIN 40 MG: 40 TABLET, FILM COATED ORAL at 22:00

## 2019-01-04 RX ADMIN — BACLOFEN 5 MG: 10 TABLET ORAL at 12:17

## 2019-01-04 RX ADMIN — BACLOFEN 5 MG: 10 TABLET ORAL at 17:06

## 2019-01-04 RX ADMIN — CETIRIZINE HYDROCHLORIDE 10 MG: 10 TABLET, FILM COATED ORAL at 08:40

## 2019-01-04 RX ADMIN — ACETAMINOPHEN 650 MG: 325 TABLET ORAL at 12:00

## 2019-01-04 RX ADMIN — QUETIAPINE FUMARATE 25 MG: 25 TABLET ORAL at 17:06

## 2019-01-04 RX ADMIN — POLYETHYLENE GLYCOL 3350 17 G: 17 POWDER, FOR SOLUTION ORAL at 08:40

## 2019-01-04 RX ADMIN — CALCIUM CARBONATE-VITAMIN D TAB 500 MG-200 UNIT 1 TABLET: 500-200 TAB at 08:40

## 2019-01-04 RX ADMIN — CALCIUM CARBONATE-VITAMIN D TAB 500 MG-200 UNIT 1 TABLET: 500-200 TAB at 12:17

## 2019-01-04 RX ADMIN — PANTOPRAZOLE SODIUM 40 MG: 40 TABLET, DELAYED RELEASE ORAL at 07:03

## 2019-01-04 RX ADMIN — TRAMADOL HYDROCHLORIDE 50 MG: 50 TABLET, FILM COATED ORAL at 07:04

## 2019-01-04 RX ADMIN — ACETAMINOPHEN 650 MG: 325 TABLET ORAL at 23:25

## 2019-01-04 RX ADMIN — ACETAMINOPHEN 650 MG: 325 TABLET ORAL at 07:04

## 2019-01-04 RX ADMIN — STANDARDIZED SENNA CONCENTRATE AND DOCUSATE SODIUM 1 TABLET: 8.6; 5 TABLET, FILM COATED ORAL at 17:06

## 2019-01-04 RX ADMIN — TRAMADOL HYDROCHLORIDE 50 MG: 50 TABLET, FILM COATED ORAL at 15:51

## 2019-01-04 RX ADMIN — CALCIUM CARBONATE-VITAMIN D TAB 500 MG-200 UNIT 1 TABLET: 500-200 TAB at 17:06

## 2019-01-04 RX ADMIN — MEMANTINE 10 MG: 10 TABLET ORAL at 17:06

## 2019-01-04 RX ADMIN — QUETIAPINE FUMARATE 25 MG: 25 TABLET ORAL at 08:40

## 2019-01-04 RX ADMIN — DONEPEZIL HYDROCHLORIDE 10 MG: 10 TABLET ORAL at 08:40

## 2019-01-04 RX ADMIN — ENOXAPARIN SODIUM 40 MG: 40 INJECTION SUBCUTANEOUS at 08:40

## 2019-01-04 RX ADMIN — ACETAMINOPHEN 650 MG: 325 TABLET ORAL at 18:00

## 2019-01-04 RX ADMIN — BACLOFEN 5 MG: 10 TABLET ORAL at 00:34

## 2019-01-04 RX ADMIN — STANDARDIZED SENNA CONCENTRATE AND DOCUSATE SODIUM 1 TABLET: 8.6; 5 TABLET, FILM COATED ORAL at 08:40

## 2019-01-04 RX ADMIN — BACLOFEN 5 MG: 10 TABLET ORAL at 07:03

## 2019-01-04 RX ADMIN — BACLOFEN 5 MG: 10 TABLET ORAL at 23:25

## 2019-01-04 NOTE — PROGRESS NOTES
SIMONE spoke with Dr. Jossue Munoz this am regarding the discharge plan for this patient. The plan is for discharge on Monday back to [facility]. SIMONE spoke with Gabby Berger with [facility] cell # [phone] with an update. No new therapy notes are in the chart at this time. An OT consult was discussed in rounds. Gabby Berger stated that the patient was minimal assist with ADL's prior at the facility. CRM has requested  that the patient continue therapy over the weekend. CRM will continue to follow. KJT 
 
3:48pm: CRM had a long conversation with the daughter's. Eliel Oneill and Nidhi Masters. CRM discussed SNF, medicare benefits and private duty care resources. The plan for now is discharge Monday or Tuesday (when medically stable) to [facility] (the earliest they will accept patient is on Monday). Will follow on Monday to arrange the details.  KJT

## 2019-01-04 NOTE — PROGRESS NOTES
Hospitalist Progress Note Natan Benítez MD 
Call  Date of Service:  2019 NAME:  Maria D Cedeno :  1940 MRN:  [id number] Admission Summary: S/p fall and right hip fracture Interval history / Subjective: No complaint. Still having hiccups.  that [facility] will take patient on Monday as they will prefer patient to get stronger,meaning have more PT sessions before d/c Assessment & Plan: Hx of fall and right hip fracture s/p right hemiarthroplasty by ortho service 
-no complaints of pain 
-on pain and bowel regimen as guided by ortho service. -PT/OT working with patient 
-Post-op lab reviewed  
-SNF on Monday as percase manager working on it History of pacemaker placement for sick sinus syndrome without any battery life in the past year. 2018 hospitalist note: \"Patient's cardiologist aware. No dysrhythmias noted. Fairly well controlled primary hypertension. \" Hiccups on 19 
-Received compazine 
-Family has reported that patient has had hiccups before and has responded well to baclofen. -Still having hiccups,GI consulted Hx of dyslipidemia/hyperlipidemia 
-on statin Hx of hypothyroidism -on replacement History of a pituitary tumor. Probable advanced dementia without any current behavioral disturbances.   
-on aricpet and memantine and other home meds 2018 hospitalist note: \"Directives. DDNR on the record. Discussed with the patient's daughter, power of . \" 
 
DVT prophylaxis: lovenox, as per ortho recs Care Plan discussed with:patient/staff - discussed with daughter who is a nurse here. Disposition:SNF on - Hospital Problems  Never Reviewed Codes Class Noted POA  
 S/P right hip fracture ICD-10-CM: Z87.81 ICD-9-CM: V15.51  2018 Unknown Review of Systems: Unable to obtain due to current state, dementia Vital Signs:  
 Last 24hrs VS reviewed since prior progress note. Most recent are: 
Visit Vitals /81 (BP 1 Location: Left arm, BP Patient Position: At rest) Pulse 71 Temp 97.9 °F (36.6 °C) Resp 16 Ht 5' 7\" (1.702 m) Wt 93.2 kg (205 lb 7.5 oz) SpO2 92% BMI 32.18 kg/m² No intake or output data in the 24 hours ending 01/04/19 1356 Physical Examination:  
 
 
     
Constitutional:  No acute distress, pleasant   
ENT:  Neck supple, Resp:  No wheezing/rhonchi/rales. No accessory muscle use CV:  Regular rhythm, normal rate, no gallops or rubs GI:  Soft, non distended, non tender. normoactive bowel sounds Musculoskeletal:  No edema, pulses present, no cyanosis Neurologic:  follows command, tracks with eyes, flat affect, alert but not oriented Data Review:  
 
 
 
Labs:  
 
Recent Labs 01/02/19 
0510 HGB 10.8* No results for input(s): NA, K, CL, CO2, BUN, CREA, GLU, CA, MG, PHOS, URICA in the last 72 hours. No results for input(s): SGOT, GPT, ALT, AP, TBIL, TBILI, TP, ALB, GLOB, GGT, AML, LPSE in the last 72 hours. No lab exists for component: AMYP, HLPSE No results for input(s): INR, PTP, APTT in the last 72 hours. No lab exists for component: INREXT, INREXT Lab Results Component Value Date/Time  Color DARK YELLOW 12/31/2018 06:38 AM  
 Appearance CLEAR 12/31/2018 06:38 AM  
 Specific gravity 1.029 12/31/2018 06:38 AM  
 Specific gravity 1.015 10/22/2018 07:03 PM  
 pH (UA) 6.0 12/31/2018 06:38 AM  
 Protein 30 (A) 12/31/2018 06:38 AM  
 Glucose NEGATIVE  12/31/2018 06:38 AM  
 Ketone TRACE (A) 12/31/2018 06:38 AM  
 Bilirubin NEGATIVE  10/19/2018 04:50 PM  
 Urobilinogen 1.0 12/31/2018 06:38 AM  
 Nitrites NEGATIVE  12/31/2018 06:38 AM  
 Leukocyte Esterase NEGATIVE  12/31/2018 06:38 AM  
 Epithelial cells MODERATE (A) 12/31/2018 06:38 AM  
 Bacteria NEGATIVE  12/31/2018 06:38 AM  
 WBC 0-4 12/31/2018 06:38 AM  
 RBC 0-5 12/31/2018 06:38 AM  
 
 
 
Medications Reviewed:  
 
______________________________________________________________________ EXPECTED LENGTH OF STAY: 2d 4h 
ACTUAL LENGTH OF STAY:          5 Dontrell Hawkins MD

## 2019-01-04 NOTE — PROGRESS NOTES
Problem: Mobility Impaired (Adult and Pediatric) Goal: *Acute Goals and Plan of Care (Insert Text) Physical Therapy Goals Initiated 1/1/2019 1. Patient will move from supine to sit and sit to supine  in bed with moderate assistance  within 4 days. 2. Patient will perform sit to stand with moderate assistance  within 4 days. 3. Patient will ambulate with moderate assistance  for 15 feet with the least restrictive device within 4 days. 4. Patient will verbalize and demonstrate understanding of posterior hip precautions per protocol within 4 days. 6. Patient will perform hip home exercise program per protocol with moderate assistance  within 4 days. physical Therapy TREATMENT Patient: Merissa Perez (24 y.o. male) Date: 1/4/2019 Diagnosis: S/P right hip fracture <principal problem not specified> Procedure(s) (LRB): 
RIGHT HIP HEMIARTHROPLASTY (Right) 4 Days Post-Op Precautions: Fall, Bed Alarm, WBAT, Total hip(posterior THR precautions) Chart, physical therapy assessment, plan of care and goals were reviewed. ASSESSMENT: 
Patient notably less agitated today and much more cooperative with therapy. Although he still required total assist for bed mobility and sit to stand, he was able to maintain sitting balance independently and standing balance with mod assist of 2 rather than max. While standing, he was able to shuffle step his feet sideways towards the head of the bed a few inches at a time when the walker was moved in the direction he needed to go. In addition, patient moved stand to sit with a verbal instruction only. Overall he is making progress and would benefit from continued progressive mobilization with PT and return to his [facility] once medically ready. Progression toward goals: 
[]    Improving appropriately and progressing toward goals [x]    Improving slowly and progressing toward goals 
[]    Not making progress toward goals and plan of care will be adjusted PLAN: 
 Patient continues to benefit from skilled intervention to address the above impairments. Continue treatment per established plan of care. Discharge Recommendations:  Home Health at his [facility] with 24 hour care Further Equipment Recommendations for Discharge:  none SUBJECTIVE:  
Patient primarily not verbal today OBJECTIVE DATA SUMMARY:  
Critical Behavior: 
Neurologic State: Alert, Confused Orientation Level: Oriented to person, Disoriented to place, Disoriented to situation, Disoriented to time Cognition: Decreased attention/concentration, Decreased command following, Impaired decision making, Impulsive, Memory loss, No command following, Poor safety awareness Safety/Judgement: Decreased awareness of environment, Decreased awareness of need for assistance, Decreased awareness of need for safety, Decreased insight into deficits Functional Mobility Training: 
Bed Mobility: 
  
Supine to Sit: Total assistance Sit to Supine: Total assistance Transfers: 
Sit to Stand: Maximum assistance;Assist x2 Stand to Sit: Moderate assistance;Assist x2 Balance: 
Sitting: Impaired; Without support Sitting - Static: Fair (occasional) Sitting - Dynamic: Fair (occasional) Standing: Impaired; With support;Pull to stand Standing - Static: Constant support Standing - Dynamic : PoorAmbulation/Gait Training: 
  
  
  
  
  
  
  
  
  
  
  
  
  
  
  
  
  
  
Stairs: 
  
  
   
 
Neuro Re-Education: 
 
Therapeutic Exercises:  
 
Pain: 
Pain Scale 1: Adult Nonverbal Pain Scale Activity Tolerance:  
Still limited but improved from previous sessions Please refer to the flowsheet for vital signs taken during this treatment. After treatment:  
[]    Patient left in no apparent distress sitting up in chair 
[x]    Patient left in no apparent distress in bed 
[x]    Call bell left within reach [x]    Nursing notified 
[x]    Caregiver present [x]    Bed alarm activated COMMUNICATION/COLLABORATION:  
The patients plan of care was discussed with: Registered Nurse Kierra Lai, PT Time Calculation: 18 mins

## 2019-01-04 NOTE — PROGRESS NOTES
Problem: Falls - Risk of 
Goal: *Absence of Falls Document Emily Primes Fall Risk and appropriate interventions in the flowsheet. Outcome: Progressing Towards Goal 
Fall Risk Interventions: 
Mobility Interventions: Communicate number of staff needed for ambulation/transfer Mentation Interventions: Adequate sleep, hydration, pain control Medication Interventions: Bed/chair exit alarm Elimination Interventions: Toilet paper/wipes in reach, Toileting schedule/hourly rounds History of Falls Interventions: Door open when patient unattended Problem: Pressure Injury - Risk of 
Goal: *Prevention of pressure injury Document Jules Scale and appropriate interventions in the flowsheet. Outcome: Progressing Towards Goal 
Pressure Injury Interventions: 
Sensory Interventions: Assess changes in LOC, Keep linens dry and wrinkle-free, Minimize linen layers Moisture Interventions: Absorbent underpads, Apply protective barrier, creams and emollients, Maintain skin hydration (lotion/cream), Minimize layers Activity Interventions: Pressure redistribution bed/mattress(bed type), PT/OT evaluation Mobility Interventions: HOB 30 degrees or less, Pressure redistribution bed/mattress (bed type), PT/OT evaluation Nutrition Interventions: Offer support with meals,snacks and hydration, Document food/fluid/supplement intake, Discuss nutritional consult with provider Friction and Shear Interventions: Apply protective barrier, creams and emollients, HOB 30 degrees or less, Lift sheet, Minimize layers

## 2019-01-04 NOTE — PROGRESS NOTES
Ortho Daily Progress Note Patient: Rocky Upton                   MRN: [id number]  Sex: male YOB: 1940           Age: 77 y.o. 
 
4 Days Post-Op Procedure(s): RIGHT HIP HEMIARTHROPLASTY Subjective: Advanced dementia Visit Vitals /72 (BP Patient Position: At rest) Pulse 63 Temp 97.5 °F (36.4 °C) Resp 16 Ht 5' 7\" (1.702 m) Wt 93.2 kg (205 lb 7.5 oz) SpO2 93% BMI 32.18 kg/m² Lab Results: HGB Date/Time Value Ref Range Status 01/02/2019 05:10 AM 10.8 (L) 12.1 - 17.0 g/dL Final  
 
INR Date/Time Value Ref Range Status 12/31/2018 02:54 AM 1.1 0.9 - 1.1   Final  
  Comment:  
  A single therapeutic range for Vit K antagonists may not be optimal for all indications - see June, 2008 issue of Chest, American College of Chest Physicians Evidence-Based Clinical Practice Guidelines, 8th Edition. Physical Exam: 
 
DRESSING: Aquacel in place SWELLING: mild NEUROLOGICAL: intact PULSE:yes GENERAL: no distress, confused, disoriented MOTION: Normal right hip ROM 
DVT Exam: No evidence of DVT seen on physical exam. 
 
 
Plan: DVT prophylaxisLovenox Weight bearing restrictionWBAT Pain Control:stable, mild-to-moderate joint symptoms intermittently, reasonably well controlled by current meds Dispo: SNF placement [address], [place] 
1/4/2019  
12:32 PM 
 
 
.

## 2019-01-04 NOTE — PROGRESS NOTES
Bedside and Verbal shift change report given to Liza Go (oncoming nurse) by Miguel Gray (offgoing nurse). Report included the following information SBAR, Kardex, MAR and Recent Results.

## 2019-01-05 PROCEDURE — 97530 THERAPEUTIC ACTIVITIES: CPT

## 2019-01-05 PROCEDURE — 74011250637 HC RX REV CODE- 250/637: Performed by: INTERNAL MEDICINE

## 2019-01-05 PROCEDURE — 74011250637 HC RX REV CODE- 250/637: Performed by: ORTHOPAEDIC SURGERY

## 2019-01-05 PROCEDURE — 74011250637 HC RX REV CODE- 250/637: Performed by: PHYSICIAN ASSISTANT

## 2019-01-05 PROCEDURE — 74011000258 HC RX REV CODE- 258: Performed by: INTERNAL MEDICINE

## 2019-01-05 PROCEDURE — 65270000029 HC RM PRIVATE

## 2019-01-05 PROCEDURE — 74011250636 HC RX REV CODE- 250/636: Performed by: ORTHOPAEDIC SURGERY

## 2019-01-05 PROCEDURE — 74011250636 HC RX REV CODE- 250/636: Performed by: FAMILY MEDICINE

## 2019-01-05 RX ORDER — HYDRALAZINE HYDROCHLORIDE 20 MG/ML
10 INJECTION INTRAMUSCULAR; INTRAVENOUS ONCE
Status: COMPLETED | OUTPATIENT
Start: 2019-01-05 | End: 2019-01-05

## 2019-01-05 RX ORDER — AMLODIPINE BESYLATE 5 MG/1
5 TABLET ORAL DAILY
Status: DISCONTINUED | OUTPATIENT
Start: 2019-01-05 | End: 2019-01-08 | Stop reason: HOSPADM

## 2019-01-05 RX ORDER — PANTOPRAZOLE SODIUM 40 MG/1
40 TABLET, DELAYED RELEASE ORAL
Status: DISCONTINUED | OUTPATIENT
Start: 2019-01-05 | End: 2019-01-08 | Stop reason: HOSPADM

## 2019-01-05 RX ORDER — SIMVASTATIN 20 MG/1
20 TABLET, FILM COATED ORAL
Status: DISCONTINUED | OUTPATIENT
Start: 2019-01-05 | End: 2019-01-08 | Stop reason: HOSPADM

## 2019-01-05 RX ADMIN — MEMANTINE 10 MG: 10 TABLET ORAL at 17:08

## 2019-01-05 RX ADMIN — TAMSULOSIN HYDROCHLORIDE 0.4 MG: 0.4 CAPSULE ORAL at 10:17

## 2019-01-05 RX ADMIN — TRAMADOL HYDROCHLORIDE 50 MG: 50 TABLET, FILM COATED ORAL at 10:17

## 2019-01-05 RX ADMIN — CALCIUM CARBONATE-VITAMIN D TAB 500 MG-200 UNIT 1 TABLET: 500-200 TAB at 17:08

## 2019-01-05 RX ADMIN — ACETAMINOPHEN 650 MG: 325 TABLET ORAL at 17:08

## 2019-01-05 RX ADMIN — SERTRALINE HYDROCHLORIDE 100 MG: 50 TABLET ORAL at 10:16

## 2019-01-05 RX ADMIN — ACETAMINOPHEN 650 MG: 325 TABLET ORAL at 05:36

## 2019-01-05 RX ADMIN — DEXTROSE MONOHYDRATE AND SODIUM CHLORIDE 75 ML/HR: 5; .45 INJECTION, SOLUTION INTRAVENOUS at 04:32

## 2019-01-05 RX ADMIN — QUETIAPINE FUMARATE 25 MG: 25 TABLET ORAL at 17:08

## 2019-01-05 RX ADMIN — PANTOPRAZOLE SODIUM 40 MG: 40 TABLET, DELAYED RELEASE ORAL at 07:54

## 2019-01-05 RX ADMIN — AMLODIPINE BESYLATE 5 MG: 5 TABLET ORAL at 10:16

## 2019-01-05 RX ADMIN — MEMANTINE 10 MG: 10 TABLET ORAL at 10:17

## 2019-01-05 RX ADMIN — CALCIUM CARBONATE-VITAMIN D TAB 500 MG-200 UNIT 1 TABLET: 500-200 TAB at 07:53

## 2019-01-05 RX ADMIN — ENOXAPARIN SODIUM 40 MG: 40 INJECTION SUBCUTANEOUS at 10:19

## 2019-01-05 RX ADMIN — BACLOFEN 5 MG: 10 TABLET ORAL at 05:36

## 2019-01-05 RX ADMIN — QUETIAPINE FUMARATE 25 MG: 25 TABLET ORAL at 10:17

## 2019-01-05 RX ADMIN — ACETAMINOPHEN 650 MG: 325 TABLET ORAL at 23:36

## 2019-01-05 RX ADMIN — PANTOPRAZOLE SODIUM 40 MG: 40 TABLET, DELAYED RELEASE ORAL at 17:08

## 2019-01-05 RX ADMIN — CETIRIZINE HYDROCHLORIDE 10 MG: 10 TABLET, FILM COATED ORAL at 10:17

## 2019-01-05 RX ADMIN — DONEPEZIL HYDROCHLORIDE 10 MG: 10 TABLET ORAL at 10:17

## 2019-01-05 RX ADMIN — HYDRALAZINE HYDROCHLORIDE 10 MG: 20 INJECTION INTRAMUSCULAR; INTRAVENOUS at 04:27

## 2019-01-05 RX ADMIN — BACLOFEN 5 MG: 10 TABLET ORAL at 23:37

## 2019-01-05 RX ADMIN — DEXTROSE MONOHYDRATE AND SODIUM CHLORIDE 75 ML/HR: 5; .45 INJECTION, SOLUTION INTRAVENOUS at 18:13

## 2019-01-05 RX ADMIN — STANDARDIZED SENNA CONCENTRATE AND DOCUSATE SODIUM 1 TABLET: 8.6; 5 TABLET, FILM COATED ORAL at 17:08

## 2019-01-05 RX ADMIN — SIMVASTATIN 20 MG: 20 TABLET, FILM COATED ORAL at 23:36

## 2019-01-05 RX ADMIN — Medication 10 ML: at 04:28

## 2019-01-05 RX ADMIN — LEVOTHYROXINE SODIUM 150 MCG: 150 TABLET ORAL at 07:54

## 2019-01-05 RX ADMIN — Medication 10 ML: at 14:00

## 2019-01-05 RX ADMIN — BACLOFEN 5 MG: 10 TABLET ORAL at 12:15

## 2019-01-05 RX ADMIN — BACLOFEN 5 MG: 10 TABLET ORAL at 17:08

## 2019-01-05 NOTE — PROGRESS NOTES
Bedside and Verbal shift change report given to Jesica CHILD (oncoming nurse) by Radha Koenig (offgoing nurse). Report included the following information SBAR, Kardex, Intake/Output, MAR and Recent Results.

## 2019-01-05 NOTE — PROGRESS NOTES
Hospitalist Progress Note Elton Grace MD 
Call  Date of Service:  2019 NAME:  Malia Bhagat :  1940 MRN:  [id number] Admission Summary: S/p fall and right hip fracture Interval history / Subjective:  
 
Seen with family at the bedside. Still having hiccups. [facility]  will take patient on Monday. Assessment & Plan: Hx of fall and right hip fracture s/p right hemiarthroplasty  
-no complaints of pain 
-on pain and bowel regimen as guided by Ortho 
-PT/OT  
-SNF on Monday History of pacemaker placement for sick sinus syndrome without any battery life in the past year 
-per prior notes - Cardiology aware Hiccups on 19 
-?GI consulted by prior hospitalist 
 
Hx of dyslipidemia/hyperlipidemia 
-on statin Hx of hypothyroidism -on replacement History of a pituitary tumor Probable advanced dementia without any current behavioral disturbances. -on Aricept and memantine and other home meds DDNR on the record DVT prophylaxis: Lovenox Care Plan discussed with: family Disposition: SNF on  Hospital Problems  Never Reviewed Codes Class Noted POA  
 S/P right hip fracture ICD-10-CM: Z87.81 ICD-9-CM: V15.51  2018 Unknown Review of Systems:  
Unable to obtain due to current state, dementia Vital Signs:  
 Last 24hrs VS reviewed since prior progress note. Most recent are: 
Visit Vitals /87 (BP 1 Location: Right arm, BP Patient Position: At rest) Pulse 65 Temp 97.6 °F (36.4 °C) Resp 16 Ht 5' 7\" (1.702 m) Wt 93.2 kg (205 lb 7.5 oz) SpO2 100% BMI 32.18 kg/m² Intake/Output Summary (Last 24 hours) at 2019 0745 Last data filed at 2019 2326 Gross per 24 hour Intake 100 ml Output  Net 100 ml Physical Examination:  
 
 
     
Constitutional:  No acute distress, pleasant   
ENT:  Neck supple Resp:  No wheezing. No accessory muscle use CV:  Regular rhythm, normal rate GI:  Soft, non distended, non tender, normoactive bowel sounds Musculoskeletal:  No edema, pulses present Neurologic:  Follows some commands, tracks with eyes. Alert Data Review:  
 
Labs, meds, imaging reviewed. Labs:  
 
No results for input(s): WBC, HGB, HCT, PLT, HGBEXT, HCTEXT, PLTEXT, HGBEXT, HCTEXT, PLTEXT in the last 72 hours. No results for input(s): NA, K, CL, CO2, BUN, CREA, GLU, CA, MG, PHOS, URICA in the last 72 hours. No results for input(s): SGOT, GPT, ALT, AP, TBIL, TBILI, TP, ALB, GLOB, GGT, AML, LPSE in the last 72 hours. No lab exists for component: AMYP, HLPSE No results for input(s): INR, PTP, APTT in the last 72 hours. No lab exists for component: INREXT, INREXT Lab Results Component Value Date/Time Color DARK YELLOW 12/31/2018 06:38 AM  
 Appearance CLEAR 12/31/2018 06:38 AM  
 Specific gravity 1.029 12/31/2018 06:38 AM  
 Specific gravity 1.015 10/22/2018 07:03 PM  
 pH (UA) 6.0 12/31/2018 06:38 AM  
 Protein 30 (A) 12/31/2018 06:38 AM  
 Glucose NEGATIVE  12/31/2018 06:38 AM  
 Ketone TRACE (A) 12/31/2018 06:38 AM  
 Bilirubin NEGATIVE  10/19/2018 04:50 PM  
 Urobilinogen 1.0 12/31/2018 06:38 AM  
 Nitrites NEGATIVE  12/31/2018 06:38 AM  
 Leukocyte Esterase NEGATIVE  12/31/2018 06:38 AM  
 Epithelial cells MODERATE (A) 12/31/2018 06:38 AM  
 Bacteria NEGATIVE  12/31/2018 06:38 AM  
 WBC 0-4 12/31/2018 06:38 AM  
 RBC 0-5 12/31/2018 06:38 AM  
 
 
 
Medications Reviewed:  
 
______________________________________________________________________ EXPECTED LENGTH OF STAY: 2d 4h 
ACTUAL LENGTH OF STAY:          6 Juan David Hannon MD

## 2019-01-05 NOTE — CONSULTS
BRIEF GI CONSULT NOTE  PATIENT SEENAND EXAMINED AND DISCUSSED WITH DAUGHTER AT BEDSIDE. FULL NOTE DICTATED. IMP:  1. HICCUPS-ETIOLOGY UNCLEAR. PERSIST DESPITE BACLOFEN  2. ALZHEIMER'S DEMENTIA  3. S/P HIP FX  4.  HX OF GERD  PLAN:  THIS MAY BE A DIFFICULT PROBLEM TO DIAGNOSE AND TREAT, BUT IN ADDITION TO THE INCREASED DOSING OF BACLOFEN, WE COULD TRY TO INCREASE THE REFLUX MANAGEMENT INTENSITY WITH BID PROTONIX AND LOW DOSE REGLAN-DISCUSSED POTENTIAL RISKS WITH HIS DAUGHTER, DAYTON, WHO IS A NURSE, BUT WOULD LIKE TO TRY THIS APPROACH

## 2019-01-05 NOTE — PROGRESS NOTES
1976: Pt /77, notified hospitalist with a MEWS score of 3 instructed to administer hydralazine 10mg. Will recheck BP in 1 hr. 
 
0520: Pt /87, daughter states this new reading is in his normal range. MEWS score is a 1. Will continue to monitor.

## 2019-01-05 NOTE — PROGRESS NOTES
Bedside shift change report given to [employer] RN (oncoming nurse) by WALESKA Dumont RN (offgoing nurse). Report included the following information SBAR, Kardex and Recent Results.

## 2019-01-05 NOTE — PROGRESS NOTES
Bedside and Verbal shift change report given to [address] Go (oncoming nurse) by Reanna Sherman RN (offgoing nurse). Report included the following information SBAR, Kardex and MAR.

## 2019-01-05 NOTE — PROGRESS NOTES
Ortho Daily Progress Note Patient: Nadine Fuller                   MRN: [id number]  Sex: male YOB: 1940           Age: 77 y.o. 
 
5 Days Post-Op Procedure(s): RIGHT HIP HEMIARTHROPLASTY Visit Vitals /83 (BP 1 Location: Right arm, BP Patient Position: At rest) Pulse 65 Temp 97.6 °F (36.4 °C) Resp 16 Ht 5' 7\" (1.702 m) Wt 93.2 kg (205 lb 7.5 oz) SpO2 100% BMI 32.18 kg/m² Lab Results: HGB Date/Time Value Ref Range Status 01/02/2019 05:10 AM 10.8 (L) 12.1 - 17.0 g/dL Final  
 
INR Date/Time Value Ref Range Status 12/31/2018 02:54 AM 1.1 0.9 - 1.1   Final  
  Comment:  
  A single therapeutic range for Vit K antagonists may not be optimal for all indications - see June, 2008 issue of Chest, American College of Chest [employer]icians Evidence-Based Clinical Practice Guidelines, 8th Edition. Physical Exam: 
 
DRESSING: clean/dry SWELLING: mild NEUROLOGICAL: intact PULSE:yes GENERAL: no distress, appears stated age, confused, disoriented MOTION: Normal right hip ROM 
DVT Exam: No evidence of DVT seen on physical exam. 
 
 
Plan: DVT prophylaxisLovenox Weight bearing restrictionWBAT Pain Control:stable, mild-to-moderate joint symptoms intermittently, reasonably well controlled by current meds Dispo: [place] when bed available [address], [place] 
1/5/2019  
11:39 AM 
 
 
.

## 2019-01-05 NOTE — PROGRESS NOTES
Problem: Mobility Impaired (Adult and Pediatric) Goal: *Acute Goals and Plan of Care (Insert Text) Physical Therapy Goals Initiated 1/1/2019 1. Patient will move from supine to sit and sit to supine  in bed with moderate assistance  within 4 days. 2. Patient will perform sit to stand with moderate assistance  within 4 days. 3. Patient will ambulate with moderate assistance  for 15 feet with the least restrictive device within 4 days. 4. Patient will verbalize and demonstrate understanding of posterior hip precautions per protocol within 4 days. 6. Patient will perform hip home exercise program per protocol with moderate assistance  within 4 days. physical Therapy TREATMENT Patient: Sam Colón (27 y.o. male) Date: 1/5/2019 Diagnosis: S/P right hip fracture <principal problem not specified> Procedure(s) (LRB): 
RIGHT HIP HEMIARTHROPLASTY (Right) 5 Days Post-Op Precautions: Fall, Bed Alarm, WBAT, Total hip(posterior THR precautions) Chart, physical therapy assessment, plan of care and goals were reviewed. ASSESSMENT: 
Pt placed in chair position in bed. Pt did initiate stand on command with visible and auditory pain. Pt was able to stand for a period of time. Unable to get pt to initiate marching or gait. Pt attempting to off weight right LE. Pt was able to stand x2 with change of brief and dependant self care. Pt was returned and placed in chair position. Pt appeared comfortable with daughter present in the room. Progression toward goals: 
[]    Improving appropriately and progressing toward goals [x]    Improving slowly and progressing toward goals 
[]    Not making progress toward goals and plan of care will be adjusted PLAN: 
Patient continues to benefit from skilled intervention to address the above impairments. Continue treatment per established plan of care. Discharge Recommendations: [place] with [address] [facility] Further Equipment Recommendations for Discharge:  TBD SUBJECTIVE:  
Patient stated Katty Dexter OBJECTIVE DATA SUMMARY:  
Critical Behavior: 
Neurologic State: Alert, Confused Orientation Level: Oriented to person, Disoriented to place, Disoriented to time, Disoriented to situation Cognition: Decreased command following, Decreased attention/concentration Safety/Judgement: Decreased awareness of environment, Decreased awareness of need for assistance, Decreased awareness of need for safety, Decreased insight into deficits Functional Mobility Training: 
Bed Mobility: 
  
Supine to Sit: (utilized bed in chair position ) Transfers: 
Sit to Stand: Maximum assistance;Assist x2 Stand to Sit: Moderate assistance;Assist x2 Balance: 
Sitting: Impaired Standing: Impaired Standing - Static: FairAmbulation/Gait Training: 
  
  
  
  
  
  
  
  
  
  
  
  
  
  
  
  
  
 
Stairs: 
  
  
   
 
Neuro Re-Education: 
 
Therapeutic Exercises:  
 
Pain: 
Pain Scale 1: PAINAD (Advanced Dementia) Pain Intensity 1: 0 Activity Tolerance:  
poor Please refer to the flowsheet for vital signs taken during this treatment. After treatment:  
[]    Patient left in no apparent distress sitting up in chair 
[x]    Patient left in no apparent distress in bed- in chair position  
[x]    Call bell left within reach [x]    Nursing notified 
[]    Caregiver present 
[]    Bed alarm activated COMMUNICATION/COLLABORATION:  
The patients plan of care was discussed with: Registered Nurse Josie Bahena PTA Time Calculation: 24 mins

## 2019-01-06 LAB
ANION GAP SERPL CALC-SCNC: 6 MMOL/L (ref 5–15)
BASOPHILS # BLD: 0 K/UL (ref 0–0.1)
BASOPHILS NFR BLD: 0 % (ref 0–1)
BUN SERPL-MCNC: 11 MG/DL (ref 6–20)
BUN/CREAT SERPL: 17 (ref 12–20)
CALCIUM SERPL-MCNC: 8.1 MG/DL (ref 8.5–10.1)
CHLORIDE SERPL-SCNC: 106 MMOL/L (ref 97–108)
CO2 SERPL-SCNC: 28 MMOL/L (ref 21–32)
CREAT SERPL-MCNC: 0.66 MG/DL (ref 0.7–1.3)
DIFFERENTIAL METHOD BLD: ABNORMAL
EOSINOPHIL # BLD: 0.2 K/UL (ref 0–0.4)
EOSINOPHIL NFR BLD: 2 % (ref 0–7)
ERYTHROCYTE [DISTWIDTH] IN BLOOD BY AUTOMATED COUNT: 13.3 % (ref 11.5–14.5)
GLUCOSE SERPL-MCNC: 114 MG/DL (ref 65–100)
HCT VFR BLD AUTO: 33 % (ref 36.6–50.3)
HGB BLD-MCNC: 10.9 G/DL (ref 12.1–17)
IMM GRANULOCYTES # BLD: 0 K/UL (ref 0–0.04)
IMM GRANULOCYTES NFR BLD AUTO: 0 % (ref 0–0.5)
LYMPHOCYTES # BLD: 1.6 K/UL (ref 0.8–3.5)
LYMPHOCYTES NFR BLD: 22 % (ref 12–49)
MCH RBC QN AUTO: 30.8 PG (ref 26–34)
MCHC RBC AUTO-ENTMCNC: 33 G/DL (ref 30–36.5)
MCV RBC AUTO: 93.2 FL (ref 80–99)
MONOCYTES # BLD: 0.7 K/UL (ref 0–1)
MONOCYTES NFR BLD: 10 % (ref 5–13)
NEUTS SEG # BLD: 4.6 K/UL (ref 1.8–8)
NEUTS SEG NFR BLD: 65 % (ref 32–75)
NRBC # BLD: 0 K/UL (ref 0–0.01)
NRBC BLD-RTO: 0 PER 100 WBC
PLATELET # BLD AUTO: 185 K/UL (ref 150–400)
PMV BLD AUTO: 10.7 FL (ref 8.9–12.9)
POTASSIUM SERPL-SCNC: 3.5 MMOL/L (ref 3.5–5.1)
RBC # BLD AUTO: 3.54 M/UL (ref 4.1–5.7)
SODIUM SERPL-SCNC: 140 MMOL/L (ref 136–145)
WBC # BLD AUTO: 7.2 K/UL (ref 4.1–11.1)

## 2019-01-06 PROCEDURE — 74011250637 HC RX REV CODE- 250/637: Performed by: INTERNAL MEDICINE

## 2019-01-06 PROCEDURE — 74011000258 HC RX REV CODE- 258: Performed by: INTERNAL MEDICINE

## 2019-01-06 PROCEDURE — 80048 BASIC METABOLIC PNL TOTAL CA: CPT

## 2019-01-06 PROCEDURE — 74011250637 HC RX REV CODE- 250/637: Performed by: ORTHOPAEDIC SURGERY

## 2019-01-06 PROCEDURE — 65270000029 HC RM PRIVATE

## 2019-01-06 PROCEDURE — 74011250636 HC RX REV CODE- 250/636: Performed by: ORTHOPAEDIC SURGERY

## 2019-01-06 PROCEDURE — 74011000250 HC RX REV CODE- 250: Performed by: ORTHOPAEDIC SURGERY

## 2019-01-06 PROCEDURE — 36415 COLL VENOUS BLD VENIPUNCTURE: CPT

## 2019-01-06 PROCEDURE — 74011250637 HC RX REV CODE- 250/637: Performed by: PHYSICIAN ASSISTANT

## 2019-01-06 PROCEDURE — 85025 COMPLETE CBC W/AUTO DIFF WBC: CPT

## 2019-01-06 PROCEDURE — 97116 GAIT TRAINING THERAPY: CPT

## 2019-01-06 RX ADMIN — PANTOPRAZOLE SODIUM 40 MG: 40 TABLET, DELAYED RELEASE ORAL at 07:13

## 2019-01-06 RX ADMIN — QUETIAPINE FUMARATE 25 MG: 25 TABLET ORAL at 17:50

## 2019-01-06 RX ADMIN — MEMANTINE 10 MG: 10 TABLET ORAL at 17:50

## 2019-01-06 RX ADMIN — BACLOFEN 5 MG: 10 TABLET ORAL at 05:00

## 2019-01-06 RX ADMIN — ACETAMINOPHEN 650 MG: 325 TABLET ORAL at 07:17

## 2019-01-06 RX ADMIN — PANTOPRAZOLE SODIUM 40 MG: 40 TABLET, DELAYED RELEASE ORAL at 17:49

## 2019-01-06 RX ADMIN — DONEPEZIL HYDROCHLORIDE 10 MG: 10 TABLET ORAL at 09:25

## 2019-01-06 RX ADMIN — DEXTROSE MONOHYDRATE AND SODIUM CHLORIDE 75 ML/HR: 5; .45 INJECTION, SOLUTION INTRAVENOUS at 07:17

## 2019-01-06 RX ADMIN — POLYETHYLENE GLYCOL 3350 17 G: 17 POWDER, FOR SOLUTION ORAL at 09:24

## 2019-01-06 RX ADMIN — AMLODIPINE BESYLATE 5 MG: 5 TABLET ORAL at 09:25

## 2019-01-06 RX ADMIN — CETIRIZINE HYDROCHLORIDE 10 MG: 10 TABLET, FILM COATED ORAL at 09:25

## 2019-01-06 RX ADMIN — CALCIUM CARBONATE-VITAMIN D TAB 500 MG-200 UNIT 1 TABLET: 500-200 TAB at 17:49

## 2019-01-06 RX ADMIN — LEVOTHYROXINE SODIUM 150 MCG: 150 TABLET ORAL at 07:14

## 2019-01-06 RX ADMIN — ACETAMINOPHEN 650 MG: 325 TABLET ORAL at 12:43

## 2019-01-06 RX ADMIN — TRAMADOL HYDROCHLORIDE 50 MG: 50 TABLET, FILM COATED ORAL at 10:30

## 2019-01-06 RX ADMIN — BACLOFEN 5 MG: 10 TABLET ORAL at 17:49

## 2019-01-06 RX ADMIN — SIMVASTATIN 20 MG: 20 TABLET, FILM COATED ORAL at 22:15

## 2019-01-06 RX ADMIN — CALCIUM CARBONATE-VITAMIN D TAB 500 MG-200 UNIT 1 TABLET: 500-200 TAB at 12:43

## 2019-01-06 RX ADMIN — ACETAMINOPHEN 650 MG: 325 TABLET ORAL at 17:49

## 2019-01-06 RX ADMIN — CALCIUM CARBONATE-VITAMIN D TAB 500 MG-200 UNIT 1 TABLET: 500-200 TAB at 07:13

## 2019-01-06 RX ADMIN — STANDARDIZED SENNA CONCENTRATE AND DOCUSATE SODIUM 1 TABLET: 8.6; 5 TABLET, FILM COATED ORAL at 17:50

## 2019-01-06 RX ADMIN — DEXTROSE MONOHYDRATE AND SODIUM CHLORIDE 75 ML/HR: 5; .45 INJECTION, SOLUTION INTRAVENOUS at 21:34

## 2019-01-06 RX ADMIN — SERTRALINE HYDROCHLORIDE 100 MG: 50 TABLET ORAL at 09:24

## 2019-01-06 RX ADMIN — TAMSULOSIN HYDROCHLORIDE 0.4 MG: 0.4 CAPSULE ORAL at 09:25

## 2019-01-06 RX ADMIN — ENOXAPARIN SODIUM 40 MG: 40 INJECTION SUBCUTANEOUS at 09:24

## 2019-01-06 RX ADMIN — QUETIAPINE FUMARATE 25 MG: 25 TABLET ORAL at 09:25

## 2019-01-06 RX ADMIN — MEMANTINE 10 MG: 10 TABLET ORAL at 09:25

## 2019-01-06 RX ADMIN — BACLOFEN 5 MG: 10 TABLET ORAL at 12:43

## 2019-01-06 RX ADMIN — STANDARDIZED SENNA CONCENTRATE AND DOCUSATE SODIUM 1 TABLET: 8.6; 5 TABLET, FILM COATED ORAL at 09:25

## 2019-01-06 NOTE — CONSULTS
Christian Mariano Bro  MR#: [id number]  : 1940  ACCOUNT #: [de-identified]   DATE OF SERVICE: 2019    HISTORY OF PRESENT ILLNESS:  The patient is a 65-year-old gentleman with advanced Alzheimer dementia who presented after a hip fracture. He had a fall at his assisted living facility and a fracture of the right hip and this has been managed here at [facility], but unfortunately since his surgery, he has developed persistent problems with hiccups. His daughter who is a nurse relates that he has had hiccups in the past which improved with baclofen after Thorazine failed to provide any relief. Unfortunately, baclofen even with an increased dosing schedule has not been effective on this admission. The patient is unable to provide any history, but the patient's daughter relates that he has a pacemaker for sick sinus syndrome with a battery life  over a year ago and it is not functional.  She relates that he does have a history of reflux for which he has taken Protonix in the past, but did not seem to have any increased symptomatology with this that he is aware of. Denies any troubles with vomiting or obvious dysphagia, although she has noted that he has lost about 20 pounds over the past year without a clear explanation. PAST MEDICAL HISTORY:  In addition is remarkable for Alzheimer disease, primary hypertension, hypothyroidism, pituitary tumor, sick sinus syndrome, status post pacemaker and recent surgery for the right hip fracture.     ADMISSION MEDICATIONS:  Included albuterol inhaler p.r.n., aspirin 81 mg daily, baclofen 10 mg half tablet t.i.d., Keflex 250 mg twice daily for a recent urinary tract infection, which he had completed, Zyrtec 10 mg daily, donepezil 10 mg daily, L-thyroxine 150 mcg daily, Lidoderm patch every 24 hours, Mylanta, memantine 28 mg daily, oxycodone/acetaminophen 5/325 one q.4 hours p.r.n., Seroquel 25 mg b.i.d., sertraline 100 mg daily, simvastatin 40 mg daily, tamsulosin 0.5 mg daily. ALLERGIES:  EXELON. SOCIAL HISTORY:  The patient lives in a memory unit in an assisted living facility. There is no history of recent tobacco or alcohol use. FAMILY HISTORY:  Negative or noncontributory. No history of significant malignancy, no premature coronary artery disease. REVIEW OF SYSTEMS:  Unobtainable; however, the patient's daughter mentions that he has had a recent urinary tract infection for which he was treated after a straight cath in the emergency room in response to some abdominal distention was performed and he developed a UTI soon thereafter. CT scan on that occasion was reportedly normal.    PHYSICAL EXAMINATION:  GENERAL:  The patient is an elderly man who is alert, but confused. He does not speak. VITAL SIGNS:  His temperature is 97.5, pulse 76. Respiratory rate is 16, blood pressure 159/80. HEENT:  Reveals no scleral icterus. NECK:  Supple. CHEST:  Clear. HEART:  Regular rate and rhythm. ABDOMEN:  Soft, nondistended. No tenderness. No mass or organomegaly. EXTREMITIES:  1-2+ edema. He does have frequent hiccups. LABORATORY DATA:  Reveals hemoglobin of 10.8. Admission hemoglobin was 13.6. He has a BUN of 15. Creatinine is 0.89 and electrolytes are essentially normal except for chloride mildly elevated at 109. On admission, his liver enzymes were normal with the exception of alkaline phosphatase of 126. IMPRESSION:  1. Hiccups of unclear etiology. This has been intermittent but a recurrent problem. The patient responded to Baclofen in the past, but not to Thorazine and now no longer responding to baclofen. This is a difficult management problem. As it can occasionally be associated with worsening reflux symptoms and the patient is unable to convey his symptomatology accurately due to his dementia, it may be worth trying to improve the reflux management.   2.  Alzheimer dementia. 3.  History of pacemaker placement for sick sinus syndrome. 4.  Recent hip fracture. 5.  History of gastroesophageal reflux. PLAN:  We will increase reflux management intensity with b.i.d. Protonix. I discussed low dose Reglan with the patient's daughter, who is a nurse and would like to try this approach, but unfortunately there are significant interactions with the Seroquel and I think in light of this potential concern we will just start with increasing the Protonix dosage. The patient's daughter is aware that this may not be effective. Of note is the fact that he has had a CT scan that was reportedly negative of his abdomen in the past month or so, and he has had a normal chest x-ray on this admission. Thank you very much for allowing us to see this patient. Emperatriz Santo.  MD Deborah Burgess /   D: 01/05/2019 16:31     T: 01/05/2019 17:05  JOB #: [id number]  CC: Vinayak Blackwell MD  CC: Suly Bustillo MD

## 2019-01-06 NOTE — PROGRESS NOTES
Problem: Mobility Impaired (Adult and Pediatric) Goal: *Acute Goals and Plan of Care (Insert Text) Physical Therapy Goals Initiated 1/1/2019 1. Patient will move from supine to sit and sit to supine  in bed with moderate assistance  within 4 days. 2. Patient will perform sit to stand with moderate assistance  within 4 days. 3. Patient will ambulate with moderate assistance  for 15 feet with the least restrictive device within 4 days. 4. Patient will verbalize and demonstrate understanding of posterior hip precautions per protocol within 4 days. 6. Patient will perform hip home exercise program per protocol with moderate assistance  within 4 days. physical Therapy TREATMENT Patient: Aliyah Fields (43 y.o. male) Date: 1/6/2019 Diagnosis: S/P right hip fracture <principal problem not specified> Procedure(s) (LRB): 
RIGHT HIP HEMIARTHROPLASTY (Right) 6 Days Post-Op Precautions: Fall, Bed Alarm, WBAT, Total hip(posterior THR precautions) Chart, physical therapy assessment, plan of care and goals were reviewed. ASSESSMENT: 
Pt able to progress with his mobility today with encouragement from his daughter and PT to participate with getting up out of bed and then to tolerate ambulating with a rolling walker, WBAT RLE, for 6'. He required maximum assist x 2 + daughter present to encourage/motivate, and with verbal and tactile stimulation to his legs to help remind him to move his legs, was able to mobilize himself. A chair was brought up behind him once he tired and he was assisted to sit down safely then repositioned by his bed. His daughter remained with him to supervise and to request nursing assistance when pt was ready to return to bed. PT to continue to mobilize pt, as tolerated,  tomorrow. Note PA's note that pt is awaiting discharge to [place] once bed available. Progression toward goals: 
[]      Improving appropriately and progressing toward goals [x]      Improving slowly and progressing toward goals 
[]      Not making progress toward goals and plan of care will be adjusted PLAN: 
Patient continues to benefit from skilled intervention to address the above impairments. Continue treatment per established plan of care. Discharge Recommendations:  [facility] Further Equipment Recommendations for Discharge:  None, if going to SNF SUBJECTIVE:  
Patient alert and generally cooperative. Pt is hard of hearing, and has decreased cognitive awareness, but with encouragement from his daughter and PT, he was able to be cooperative, and assisted, as able, to get up out of bed and to ambulate a short distance with the walker before sitting down in a chair. Daughter is a nurse at [employer]. OBJECTIVE DATA SUMMARY:  
Critical Behavior: 
Neurologic State: Alert, Confused Orientation Level: Oriented X4 Cognition: Follows commands Safety/Judgement: Decreased awareness of environment, Decreased awareness of need for assistance, Decreased awareness of need for safety, Decreased insight into deficits Functional Mobility Training: 
Bed Mobility: 
  
Supine to Sit: Maximum assistance;Assist x2 Sit to Supine: Maximum assistance;Assist x2 Scooting: Maximum assistance;Assist x2 Transfers: 
Sit to Stand: Maximum assistance;Assist x2 Stand to Sit: Moderate assistance;Assist x2(sat in chair) Balance: 
Sitting: Impaired Sitting - Static: Fair (occasional) Sitting - Dynamic: Fair (occasional) Standing - Static: Poor Standing - Dynamic : PoorAmbulation/Gait Training: 
Distance (ft): 6 Feet (ft) Assistive Device: Gait belt;Walker, rolling Ambulation - Level of Assistance: Maximum assistance;Assist x2(+ daughter in front to encourage and help motivate) Gait Abnormalities: Decreased step clearance(required verbal and tactile stimulation to move feet/legs) Base of Support: Narrowed Speed/Angi: Pace decreased (<100 feet/min) Step Length: Right shortened;Left shortened Therapeutic Exercises:  
SUPINE 
EXERCISES Sets Reps Active Active Assist  
Passive Self ROM Comments Ankle Pumps 1 10 [x]                                           []                                           []                                           []                                             
Quad Sets 1 5 [x]                                           []                                           []                                           []                                             
Heel Slides 1 2 [x]                                           []                                           []                                           []                                             
Hip Abduction   []                                           []                                           []                                           []                                             
Hip External Rotation   []                                           []                                           []                                           []                                             
Glut Sets   []                                           []                                           []                                           []                                             
   []                                           []                                           []                                           []                                             
   []                                           []                                           []                                           []                                             
Pain: 
Pain Scale 1: Visual 
Pain Intensity 1: 0 Activity Tolerance: Fair - pt able to tolerate up out of bed to ambulate 6' with a rolling walker, WBAT RLE, requiring maximum assist x 2+having daughter walk in front+ verbal as well as tactile stimulation to move BLEs. Pt sat up in a chair with daughter supervising. She will notify nurse when pt needs assistance returning to bed. Please refer to the flowsheet for vital signs taken during this treatment. After treatment:  
[x]  Patient left in no apparent distress sitting up in chair 
[]  Patient left in no apparent distress in bed 
[x]  Call bell left within reach [x]  Nursing notified 
[x]  Caregiver present - daughter 
[]  Bed alarm activated COMMUNICATION/COLLABORATION:  
The patients plan of care was discussed with: Registered Nurse Yaquelin Ospina, PT Time Calculation: 15 mins

## 2019-01-06 NOTE — PROGRESS NOTES
Hospitalist Progress Note Kristine Holly MD 
Call  Date of Service:  2019 NAME:  Indio Soto :  1940 MRN:  [id number] Admission Summary: S/p fall and right hip fracture Interval history / Subjective:  
 
Seen with family at the bedside. Still having hiccups. [facility] will take patient tomorrow. Family may want to pay for extra PT out of pocket. Assessment & Plan: Hx of fall and right hip fracture s/p right hemiarthroplasty  
-no complaints of pain 
-on pain and bowel regimen 
-Ortho following 
-PT/OT  
-SNF tomorrow History of pacemaker placement for sick sinus syndrome without any battery life in the past year 
-per prior notes - Cardiology aware Hiccups on 19 
-?GI consulted by prior hospitalist 
-ongoing Hx of dyslipidemia/hyperlipidemia 
-on statin Hx of hypothyroidism -on replacement History of a pituitary tumor Probable advanced dementia without any current behavioral disturbances. -on Aricept and memantine and other home meds DDNR on the record DVT prophylaxis: Lovenox Care Plan discussed with: family Disposition: SNF on  Hospital Problems  Never Reviewed Codes Class Noted POA  
 S/P right hip fracture ICD-10-CM: Z87.81 ICD-9-CM: V15.51  2018 Unknown Review of Systems:  
Unable to obtain due to current state, dementia Vital Signs:  
 Last 24hrs VS reviewed since prior progress note. Most recent are: 
Visit Vitals /81 Pulse 67 Temp 98.1 °F (36.7 °C) Resp 16 Ht 5' 7\" (1.702 m) Wt 85.5 kg (188 lb 7.9 oz) SpO2 91% BMI 29.52 kg/m² No intake or output data in the 24 hours ending 19 0735 Physical Examination:  
 
   
Constitutional:  No acute distress ENT:  Neck supple Resp:  No wheezing. No accessory muscle use CV:  Regular rhythm, normal rate GI:  Soft, non distended, non tender, normoactive bowel sounds Musculoskeletal:  No edema, pulses present Neurologic:  Follows some commands, tracks with eyes. Alert Data Review:  
 
Labs, meds, imaging reviewed. Labs:  
 
Recent Labs 01/06/19 
0241 WBC 7.2 HGB 10.9* HCT 33.0*  
 Recent Labs 01/06/19 
0241   
K 3.5  CO2 28 BUN 11  
CREA 0.66* * CA 8.1* No results for input(s): SGOT, GPT, ALT, AP, TBIL, TBILI, TP, ALB, GLOB, GGT, AML, LPSE in the last 72 hours. No lab exists for component: AMYP, HLPSE No results for input(s): INR, PTP, APTT in the last 72 hours. No lab exists for component: INREXT, INREXT Lab Results Component Value Date/Time Color DARK YELLOW 12/31/2018 06:38 AM  
 Appearance CLEAR 12/31/2018 06:38 AM  
 Specific gravity 1.029 12/31/2018 06:38 AM  
 Specific gravity 1.015 10/22/2018 07:03 PM  
 pH (UA) 6.0 12/31/2018 06:38 AM  
 Protein 30 (A) 12/31/2018 06:38 AM  
 Glucose NEGATIVE  12/31/2018 06:38 AM  
 Ketone TRACE (A) 12/31/2018 06:38 AM  
 Bilirubin NEGATIVE  10/19/2018 04:50 PM  
 Urobilinogen 1.0 12/31/2018 06:38 AM  
 Nitrites NEGATIVE  12/31/2018 06:38 AM  
 Leukocyte Esterase NEGATIVE  12/31/2018 06:38 AM  
 Epithelial cells MODERATE (A) 12/31/2018 06:38 AM  
 Bacteria NEGATIVE  12/31/2018 06:38 AM  
 WBC 0-4 12/31/2018 06:38 AM  
 RBC 0-5 12/31/2018 06:38 AM  
 
 
 
Medications Reviewed:  
 
______________________________________________________________________ EXPECTED LENGTH OF STAY: 2d 4h 
ACTUAL LENGTH OF STAY:          7 Adrienne Coker MD

## 2019-01-06 NOTE — PROGRESS NOTES
GI PROGRESS NOTE 
 
NAME:             Keri Shipley :              1940 MRN:              [id number] Admit Date:     2018 Todays Date:  2019 Subjective:  
     
  Hiccups continue, but son feels they are somewhat improved. Jose Daily Medications-reviewed Current Facility-Administered Medications Medication Dose Route Frequency  amLODIPine (NORVASC) tablet 5 mg  5 mg Oral DAILY  simvastatin (ZOCOR) tablet 20 mg  20 mg Oral QHS  pantoprazole (PROTONIX) tablet 40 mg  40 mg Oral ACB&D  
 baclofen (LIORESAL) tablet 5 mg  5 mg Oral Q6H  
 sodium chloride (NS) flush 5-10 mL  5-10 mL IntraVENous Q8H  
 sodium chloride (NS) flush 5-10 mL  5-10 mL IntraVENous PRN  
 naloxone (NARCAN) injection 0.4 mg  0.4 mg IntraVENous PRN  
 calcium-vitamin D (OS-CHERYL) 500 mg-200 unit tablet  1 Tab Oral TID WITH MEALS  senna-docusate (PERICOLACE) 8.6-50 mg per tablet 1 Tab  1 Tab Oral BID  polyethylene glycol (MIRALAX) packet 17 g  17 g Oral DAILY  bisacodyl (DULCOLAX) suppository 10 mg  10 mg Rectal DAILY PRN  
 enoxaparin (LOVENOX) injection 40 mg  40 mg SubCUTAneous DAILY  levothyroxine (SYNTHROID) tablet 150 mcg  150 mcg Oral ACB  cetirizine (ZYRTEC) tablet 10 mg  10 mg Oral DAILY  donepezil (ARICEPT) tablet 10 mg  10 mg Oral DAILY  memantine (NAMENDA) tablet 10 mg  10 mg Oral BID  QUEtiapine (SEROquel) tablet 25 mg  25 mg Oral BID  sertraline (ZOLOFT) tablet 100 mg  100 mg Oral DAILY  tamsulosin (FLOMAX) capsule 0.4 mg  0.4 mg Oral DAILY  sodium chloride (NS) flush 5-10 mL  5-10 mL IntraVENous Q8H  
 sodium chloride (NS) flush 5-10 mL  5-10 mL IntraVENous PRN  
 dextrose 5 % - 0.45% NaCl infusion  75 mL/hr IntraVENous CONTINUOUS  
 acetaminophen (TYLENOL) tablet 650 mg  650 mg Oral Q4H PRN  
 ondansetron (ZOFRAN ODT) tablet 4 mg  4 mg Oral Q6H PRN  
 magnesium hydroxide (MILK OF MAGNESIA) 400 mg/5 mL oral suspension 30 mL  30 mL Oral DAILY PRN  
  morphine injection 1 mg  1 mg IntraVENous Q4H PRN  
 acetaminophen (TYLENOL) tablet 650 mg  650 mg Oral Q6H  
 traMADol (ULTRAM) tablet 50 mg  50 mg Oral Q6H PRN  
 HYDROcodone-acetaminophen (NORCO) 5-325 mg per tablet 1 Tab  1 Tab Oral Q4H PRN Objective:  
 
Patient Vitals for the past 8 hrs: 
 BP Temp Pulse Resp SpO2 Weight 01/06/19 0540      85.5 kg (188 lb 7.9 oz) 01/06/19 0250 168/81 98.1 °F (36.7 °C) 67 16 91 %  No intake/output data recorded. 01/04 1901 - 01/06 0700 In: 100 [P.O.:100] Out: - EXAM:   
 NEURO-a&o HEENT-wnl LUNGS-clear COR-regular rate and rhythym ABD-soft , no tenderness, no rebound, good bs EXT-no edema Data Review Recent Labs 01/06/19 
0241 WBC 7.2 HGB 10.9* HCT 33.0*  
 Recent Labs 01/06/19 
0241   
K 3.5  CO2 28 BUN 11  
CREA 0.66* * CA 8.1* No results for input(s): SGOT, GPT, AP, TBIL, TP, ALB, GLOB, GGT, AML, LPSE in the last 72 hours. No lab exists for component: AMYP, HLPSE Assessment:  
1. Hiccups 2. GERD 3. Alzheimer's disease Active Problems: S/P right hip fracture (12/30/2018) Plan: 1. Continue Protonix BID and increased Baclofen dosing 2. Will hold off on Reglan due to drug interaction potential 
3. Discussed with family (son) at bedside

## 2019-01-06 NOTE — PROGRESS NOTES
Bedside and Verbal shift change report given to Cade Go (oncoming nurse) by Sapphire Alva RN (offgoing nurse). Report included the following information SBAR, Kardex and MAR.

## 2019-01-07 LAB
APPEARANCE UR: CLEAR
BACTERIA URNS QL MICRO: NEGATIVE /HPF
BILIRUB UR QL: NEGATIVE
COLOR UR: NORMAL
EPITH CASTS URNS QL MICRO: NORMAL /LPF
GLUCOSE UR STRIP.AUTO-MCNC: NEGATIVE MG/DL
HGB UR QL STRIP: NEGATIVE
HYALINE CASTS URNS QL MICRO: NORMAL /LPF (ref 0–5)
KETONES UR QL STRIP.AUTO: NEGATIVE MG/DL
LEUKOCYTE ESTERASE UR QL STRIP.AUTO: NEGATIVE
NITRITE UR QL STRIP.AUTO: NEGATIVE
PH UR STRIP: 6 [PH] (ref 5–8)
PROT UR STRIP-MCNC: NEGATIVE MG/DL
RBC #/AREA URNS HPF: NORMAL /HPF (ref 0–5)
SP GR UR REFRACTOMETRY: 1.03 (ref 1–1.03)
UA: UC IF INDICATED,UAUC: NORMAL
UROBILINOGEN UR QL STRIP.AUTO: 1 EU/DL (ref 0.2–1)
WBC URNS QL MICRO: NORMAL /HPF (ref 0–4)

## 2019-01-07 PROCEDURE — 74011250637 HC RX REV CODE- 250/637: Performed by: PHYSICIAN ASSISTANT

## 2019-01-07 PROCEDURE — 74011250636 HC RX REV CODE- 250/636: Performed by: ORTHOPAEDIC SURGERY

## 2019-01-07 PROCEDURE — 74011250637 HC RX REV CODE- 250/637: Performed by: INTERNAL MEDICINE

## 2019-01-07 PROCEDURE — 65270000029 HC RM PRIVATE

## 2019-01-07 PROCEDURE — 97530 THERAPEUTIC ACTIVITIES: CPT

## 2019-01-07 PROCEDURE — 74011250636 HC RX REV CODE- 250/636: Performed by: HOSPITALIST

## 2019-01-07 PROCEDURE — 74011000250 HC RX REV CODE- 250: Performed by: ORTHOPAEDIC SURGERY

## 2019-01-07 PROCEDURE — 94760 N-INVAS EAR/PLS OXIMETRY 1: CPT

## 2019-01-07 PROCEDURE — 74011250637 HC RX REV CODE- 250/637: Performed by: ORTHOPAEDIC SURGERY

## 2019-01-07 PROCEDURE — 77030011943

## 2019-01-07 PROCEDURE — 81001 URINALYSIS AUTO W/SCOPE: CPT

## 2019-01-07 PROCEDURE — 74011000258 HC RX REV CODE- 258: Performed by: INTERNAL MEDICINE

## 2019-01-07 RX ORDER — HYDRALAZINE HYDROCHLORIDE 20 MG/ML
20 INJECTION INTRAMUSCULAR; INTRAVENOUS ONCE
Status: COMPLETED | OUTPATIENT
Start: 2019-01-07 | End: 2019-01-07

## 2019-01-07 RX ADMIN — LEVOTHYROXINE SODIUM 150 MCG: 150 TABLET ORAL at 07:38

## 2019-01-07 RX ADMIN — ACETAMINOPHEN 650 MG: 325 TABLET ORAL at 06:02

## 2019-01-07 RX ADMIN — PANTOPRAZOLE SODIUM 40 MG: 40 TABLET, DELAYED RELEASE ORAL at 18:15

## 2019-01-07 RX ADMIN — POLYETHYLENE GLYCOL 3350 17 G: 17 POWDER, FOR SOLUTION ORAL at 09:24

## 2019-01-07 RX ADMIN — HYDRALAZINE HYDROCHLORIDE 20 MG: 20 INJECTION INTRAMUSCULAR; INTRAVENOUS at 06:04

## 2019-01-07 RX ADMIN — ACETAMINOPHEN 650 MG: 325 TABLET ORAL at 13:59

## 2019-01-07 RX ADMIN — CALCIUM CARBONATE-VITAMIN D TAB 500 MG-200 UNIT 1 TABLET: 500-200 TAB at 18:15

## 2019-01-07 RX ADMIN — ENOXAPARIN SODIUM 40 MG: 40 INJECTION SUBCUTANEOUS at 09:24

## 2019-01-07 RX ADMIN — TRAMADOL HYDROCHLORIDE 50 MG: 50 TABLET, FILM COATED ORAL at 06:09

## 2019-01-07 RX ADMIN — BACLOFEN 5 MG: 10 TABLET ORAL at 00:43

## 2019-01-07 RX ADMIN — MEMANTINE 10 MG: 10 TABLET ORAL at 18:15

## 2019-01-07 RX ADMIN — STANDARDIZED SENNA CONCENTRATE AND DOCUSATE SODIUM 1 TABLET: 8.6; 5 TABLET, FILM COATED ORAL at 18:15

## 2019-01-07 RX ADMIN — PANTOPRAZOLE SODIUM 40 MG: 40 TABLET, DELAYED RELEASE ORAL at 07:38

## 2019-01-07 RX ADMIN — DONEPEZIL HYDROCHLORIDE 10 MG: 10 TABLET ORAL at 09:25

## 2019-01-07 RX ADMIN — AMLODIPINE BESYLATE 5 MG: 5 TABLET ORAL at 09:25

## 2019-01-07 RX ADMIN — BACLOFEN 5 MG: 10 TABLET ORAL at 06:02

## 2019-01-07 RX ADMIN — SIMVASTATIN 20 MG: 20 TABLET, FILM COATED ORAL at 21:27

## 2019-01-07 RX ADMIN — STANDARDIZED SENNA CONCENTRATE AND DOCUSATE SODIUM 1 TABLET: 8.6; 5 TABLET, FILM COATED ORAL at 09:25

## 2019-01-07 RX ADMIN — SERTRALINE HYDROCHLORIDE 100 MG: 50 TABLET ORAL at 09:25

## 2019-01-07 RX ADMIN — QUETIAPINE FUMARATE 25 MG: 25 TABLET ORAL at 18:15

## 2019-01-07 RX ADMIN — CALCIUM CARBONATE-VITAMIN D TAB 500 MG-200 UNIT 1 TABLET: 500-200 TAB at 09:25

## 2019-01-07 RX ADMIN — DEXTROSE MONOHYDRATE AND SODIUM CHLORIDE 75 ML/HR: 5; .45 INJECTION, SOLUTION INTRAVENOUS at 22:55

## 2019-01-07 RX ADMIN — TAMSULOSIN HYDROCHLORIDE 0.4 MG: 0.4 CAPSULE ORAL at 09:25

## 2019-01-07 RX ADMIN — MEMANTINE 10 MG: 10 TABLET ORAL at 09:25

## 2019-01-07 RX ADMIN — BACLOFEN 5 MG: 10 TABLET ORAL at 13:58

## 2019-01-07 RX ADMIN — ACETAMINOPHEN 650 MG: 325 TABLET ORAL at 00:43

## 2019-01-07 RX ADMIN — BACLOFEN 5 MG: 10 TABLET ORAL at 18:14

## 2019-01-07 RX ADMIN — CETIRIZINE HYDROCHLORIDE 10 MG: 10 TABLET, FILM COATED ORAL at 09:25

## 2019-01-07 RX ADMIN — HYDROCODONE BITARTRATE AND ACETAMINOPHEN 1 TABLET: 5; 325 TABLET ORAL at 09:25

## 2019-01-07 RX ADMIN — ACETAMINOPHEN 650 MG: 325 TABLET ORAL at 18:00

## 2019-01-07 RX ADMIN — CALCIUM CARBONATE-VITAMIN D TAB 500 MG-200 UNIT 1 TABLET: 500-200 TAB at 13:59

## 2019-01-07 RX ADMIN — QUETIAPINE FUMARATE 25 MG: 25 TABLET ORAL at 09:25

## 2019-01-07 NOTE — PROGRESS NOTES
Bedside and Verbal shift change report given to Teresa Valencia RN (oncoming nurse) by Sara Adkins RN (offgoing nurse). Report included the following information SBAR, Kardex, Procedure Summary, Intake/Output, MAR, Accordion and Recent Results.

## 2019-01-07 NOTE — ROUTINE PROCESS
Wheelchair order sent to [employer]. [facility]. [phone] Pt found lying in bed without c/o, pleasant and engaging and agreeable to PT

## 2019-01-07 NOTE — PROGRESS NOTES
GI PROGRESS NOTE 
 
NAME:             Samara Blizzard :              1940 MRN:              [id number] Admit Date:     2018 Todays Date:  2019 Subjective:  
     
  Hiccups continue, but none overnight. About to eat breakfast. 
 
Medications-reviewed Current Facility-Administered Medications Medication Dose Route Frequency  amLODIPine (NORVASC) tablet 5 mg  5 mg Oral DAILY  simvastatin (ZOCOR) tablet 20 mg  20 mg Oral QHS  pantoprazole (PROTONIX) tablet 40 mg  40 mg Oral ACB&D  
 baclofen (LIORESAL) tablet 5 mg  5 mg Oral Q6H  
 sodium chloride (NS) flush 5-10 mL  5-10 mL IntraVENous Q8H  
 sodium chloride (NS) flush 5-10 mL  5-10 mL IntraVENous PRN  
 naloxone (NARCAN) injection 0.4 mg  0.4 mg IntraVENous PRN  
 calcium-vitamin D (OS-CHERYL) 500 mg-200 unit tablet  1 Tab Oral TID WITH MEALS  senna-docusate (PERICOLACE) 8.6-50 mg per tablet 1 Tab  1 Tab Oral BID  polyethylene glycol (MIRALAX) packet 17 g  17 g Oral DAILY  bisacodyl (DULCOLAX) suppository 10 mg  10 mg Rectal DAILY PRN  
 enoxaparin (LOVENOX) injection 40 mg  40 mg SubCUTAneous DAILY  levothyroxine (SYNTHROID) tablet 150 mcg  150 mcg Oral ACB  cetirizine (ZYRTEC) tablet 10 mg  10 mg Oral DAILY  donepezil (ARICEPT) tablet 10 mg  10 mg Oral DAILY  memantine (NAMENDA) tablet 10 mg  10 mg Oral BID  QUEtiapine (SEROquel) tablet 25 mg  25 mg Oral BID  sertraline (ZOLOFT) tablet 100 mg  100 mg Oral DAILY  tamsulosin (FLOMAX) capsule 0.4 mg  0.4 mg Oral DAILY  sodium chloride (NS) flush 5-10 mL  5-10 mL IntraVENous Q8H  
 sodium chloride (NS) flush 5-10 mL  5-10 mL IntraVENous PRN  
 dextrose 5 % - 0.45% NaCl infusion  75 mL/hr IntraVENous CONTINUOUS  
 acetaminophen (TYLENOL) tablet 650 mg  650 mg Oral Q4H PRN  
 ondansetron (ZOFRAN ODT) tablet 4 mg  4 mg Oral Q6H PRN  
 magnesium hydroxide (MILK OF MAGNESIA) 400 mg/5 mL oral suspension 30 mL  30 mL Oral DAILY PRN  
  morphine injection 1 mg  1 mg IntraVENous Q4H PRN  
 acetaminophen (TYLENOL) tablet 650 mg  650 mg Oral Q6H  
 traMADol (ULTRAM) tablet 50 mg  50 mg Oral Q6H PRN  
 HYDROcodone-acetaminophen (NORCO) 5-325 mg per tablet 1 Tab  1 Tab Oral Q4H PRN Objective:  
 
Patient Vitals for the past 8 hrs: 
 BP Temp Pulse Resp SpO2  
01/07/19 0628 144/74      
01/07/19 0614 177/56      
01/07/19 0603 188/81      
01/07/19 0524 181/89      
01/07/19 0500 179/83 98.4 °F (36.9 °C) 65 18 97 % No intake/output data recorded. No intake/output data recorded. EXAM:   
 NEURO-a&o HEENT-wnl LUNGS-clear COR-regular rate and rhythym ABD-soft , no tenderness, no rebound, good bs EXT-no edema Data Review Recent Labs 01/06/19 
0241 WBC 7.2 HGB 10.9* HCT 33.0*  
 Recent Labs 01/06/19 
0241   
K 3.5  CO2 28 BUN 11  
CREA 0.66* * CA 8.1* No results for input(s): SGOT, GPT, AP, TBIL, TP, ALB, GLOB, GGT, AML, LPSE in the last 72 hours. No lab exists for component: AMYP, HLPSE Assessment:  
1. Hiccups 2. GERD 3. Alzheimer's disease Active Problems: S/P right hip fracture (12/30/2018) Plan: 1. Continue Protonix BID and increased Baclofen dosing 2. Will hold off on Reglan due to drug interaction potential 
3. Will sign off. Please call with any questions. Follow-up with Dr. Jamaica Love as an outpatient if needed.

## 2019-01-07 NOTE — PROGRESS NOTES
Ortho: 
Family states he is frustrated by hiccups but no obvious pain right hip. T98.4 
NAD. Sitting up in bed. No pain response gentle PROM right hip. Hgb 10.9 Imp: POD #7 right hip deysi-arthroplasty Plan: 1. Lovenox 2. PT/OT WBAT posterior precautions. 3. Disp planning.  
 
ISMAEL Ford

## 2019-01-07 NOTE — PROGRESS NOTES
Problem: Mobility Impaired (Adult and Pediatric) Goal: *Acute Goals and Plan of Care (Insert Text) Physical Therapy Goals Initiated 1/1/2019 1. Patient will move from supine to sit and sit to supine  in bed with moderate assistance  within 4 days. 2. Patient will perform sit to stand with moderate assistance  within 4 days. 3. Patient will ambulate with moderate assistance  for 15 feet with the least restrictive device within 4 days. 4. Patient will verbalize and demonstrate understanding of posterior hip precautions per protocol within 4 days. 6. Patient will perform hip home exercise program per protocol with moderate assistance  within 4 days. Outcome: Progressing Towards Goal 
physical Therapy TREATMENT Patient: Julia Barnard (89 y.o. male) Date: 1/7/2019 Diagnosis: S/P right hip fracture <principal problem not specified> Procedure(s) (LRB): 
RIGHT HIP HEMIARTHROPLASTY (Right) 7 Days Post-Op Precautions: Fall, Bed Alarm, WBAT, Total hip(posterior THR precautions) Chart, physical therapy assessment, plan of care and goals were reviewed. ASSESSMENT: 
Pt received in bed w/ family friend at bedside ( was assisting pt w/ feeding); HOB elevated. Continues to have hiccups throughout session. Pt bed mobility gradually showing improvement as pt able to transfer to EOB w/ Mod-min A x2 +additional time. Pt sitting balance improved as pt able to maintain static sitting w/ CGA and feet flat on floor. Tolerated approx 2-3 minutes of sitting EOB for W/C measurements. Pt stood w/ Mod-Max Ax2 to RW. Note altered COG and ABBIE along w/ posterior lean. Pt required verbal and tactile cues to side step to [facility]. Pt unable to  either feet off the floor and minimally scooting R foot w/ tactile assist d/t pain when attempting to WB through RLE. Pt unable to complete side-step to [facility] and returned to sitting,then to supine w/ Max Ax2.  Completed heel slides (PROM) (BLE); high guarding/resistance noted of BLE's (R>L). Will continue to follow. Per CM, pt to return to [facility] ([place] and family requesting wheelchair for transfers and for likely transport to dining areas. Progression toward goals: 
[]    Improving appropriately and progressing toward goals [x]    Improving slowly and progressing toward goals 
[]    Not making progress toward goals and plan of care will be adjusted PLAN: 
Patient continues to benefit from skilled intervention to address the above impairments. Continue treatment per established plan of care. Discharge Recommendations:  Westley Lozano Further Equipment Recommendations for Discharge:  If to return to [facility]:wheelchair, gait belt, rolling walker SUBJECTIVE:  
Patient grumbled No. during stand. OBJECTIVE DATA SUMMARY:  
Critical Behavior: 
Neurologic State: Alert, Confused Orientation Level: Oriented to person Cognition: Follows commands Safety/Judgement: Decreased awareness of environment, Decreased awareness of need for assistance, Decreased awareness of need for safety, Decreased insight into deficits Functional Mobility Training: 
Bed Mobility: 
  
Supine to Sit: Minimum assistance; Moderate assistance; Additional time;Assist x2(HOB moderatlely elevated) Sit to Supine: Maximum assistance;Assist x2 Transfers: 
Sit to Stand: Maximum assistance;Assist x2 Stand to Sit: Moderate assistance;Assist x2 Balance: 
Sitting: Impaired Sitting - Static: Good (unsupported) Sitting - Dynamic: Fair (occasional) Standing: Impaired Standing - Static: Constant support;Poor Standing - Dynamic : PoorAmbulation/Gait Training: 
Distance (ft): 1 Feet (ft)(pivoting L foot on floor. Unable to move BLE's w/o TC's) Assistive Device: Gait belt;Walker, rolling Therapeutic Exercises:  
Heel slides Pain: Activity Tolerance:  
Limited Please refer to the flowsheet for vital signs taken during this treatment. After treatment:  
[]    Patient left in no apparent distress sitting up in chair 
[x]    Patient left in no apparent distress in bed 
[x]    Call bell left within reach [x]    Nursing notified 
[]    Caregiver present 
[]    Bed alarm activated COMMUNICATION/COLLABORATION:  
The patients plan of care was discussed with: Registered Nurse Madhavi Frias Time Calculation: 13 mins

## 2019-01-07 NOTE — PROGRESS NOTES
01/07/19 0500 Vital Signs Temp 98.4 °F (36.9 °C) Temp Source Axillary Pulse (Heart Rate) 65 Heart Rate Source Monitor Cardiac Rhythm NSR Resp Rate 18  
O2 Sat (%) 97 % Level of Consciousness Alert /83 MAP (Calculated) 115 BP 1 Method Automatic  
BP 1 Location Right arm BP Patient Position At rest  
MEWS Score 1 Paged Dr David Valero regarding elevated blood pressure. Received verbal orders for one time dose of 20mg IV hydralazine.

## 2019-01-07 NOTE — PROGRESS NOTES
CRM called [facility] for Felipe Frenchn [phone] to give update see when the patient could return/discharge. Will follow. Claudia NICHOLS spoke with Ric Wlaton [phone] with an update. The daughter is concerned about a possible UTI (CRM informed Monserrat Lowe RN.) The daughter would also like the patient to be discharged earlier in the day and prefers tomorrow. She is not available until late this PM. CRM  waiting to hear back from Felipe Power with the [address] [place] [address]. CLAUDIA NICHOLS spoke with Felipe Power with [facility] and gave update. The plan will be for discharge tomorrow [facility] to the [facility] with PT and OT. The daughter would like to be with the patient at discharge. CRM has requested therapy to order a WC. 11am [facility] time requested. [facility] will notify the daughter and Dr. Donna Henson. CLAUDIA 
 
11am discharge time confirmed. CLAUDIA Discharge folder located on the hard chart with report # [phone] with [facility] CLAUDIA

## 2019-01-07 NOTE — PROGRESS NOTES
Bedside and Verbal shift change report given to Demarcus Anderson RN (oncoming nurse) by Sharonda Goldberg RN (offgoing nurse). Report included the following information SBAR, Kardex, Intake/Output, MAR and Recent Results.

## 2019-01-07 NOTE — PROGRESS NOTES
Hospitalist Progress Note Kristine Holly MD 
Call  Date of Service:  2019 NAME:  Indio Soto :  1940 MRN:  [id number] Admission Summary: S/p fall and right hip fracture Interval history / Subjective:  
 
Seen with family friend at the bedside. Still having hiccups. Now family is concerned that patient may have UTI. Urinalysis to be sent. Discussed with nursing. Rufino Leung will take patient tomorrow and transport is set at 11 AM. Family may want to pay for extra PT out of pocket. Discussed with daughter yesterday. Assessment & Plan: Hx of fall and right hip fracture s/p right hemiarthroplasty  
-no complaints of pain 
-on pain and bowel regimen 
-Ortho following 
-PT/OT  
-SNF tomorrow History of pacemaker placement for sick sinus syndrome without any battery life in the past year 
-per prior notes - Cardiology aware Hiccups on 19 
-?GI consulted by prior hospitalist 
-ongoing 
-baclofen was increased 
-thorazine contraindicated in this Geriatric patient Hx of dyslipidemia/hyperlipidemia 
-on statin Hx of hypothyroidism -on replacement History of a pituitary tumor Probable advanced dementia without any current behavioral disturbances. -on Aricept and memantine and other home meds DDNR on the record DVT prophylaxis: Lovenox Care Plan discussed with: family Disposition: Back to facility tomorrow Hospital Problems  Never Reviewed Codes Class Noted POA  
 S/P right hip fracture ICD-10-CM: Z87.81 ICD-9-CM: V15.51  2018 Unknown Review of Systems:  
Unable to obtain due to current state, dementia Vital Signs:  
 Last 24hrs VS reviewed since prior progress note. Most recent are: 
Visit Vitals /71 (BP 1 Location: Right arm, BP Patient Position: At rest) Pulse 77 Temp 98.4 °F (36.9 °C) Resp 18 Ht 5' 7\" (1.702 m) Wt 85.5 kg (188 lb 7.9 oz) SpO2 95% BMI 29.52 kg/m² Intake/Output Summary (Last 24 hours) at 1/7/2019 1156 Last data filed at 1/7/2019 0393 Gross per 24 hour Intake 1140 ml Output  Net 1140 ml Physical Examination:  
 
   
Constitutional:  No acute distress, hiccups ENT:  Neck supple Resp:  No wheezing. No accessory muscle use CV:  Regular rhythm, normal rate GI:  Soft, non distended, non tender, normoactive bowel sounds Musculoskeletal:  No edema, pulses present Neurologic:  Follows some commands, tracks with eyes. Alert Data Review:  
 
Labs, meds, imaging reviewed. Labs:  
 
Recent Labs 01/06/19 
0241 WBC 7.2 HGB 10.9* HCT 33.0*  
 Recent Labs 01/06/19 
0241   
K 3.5  CO2 28 BUN 11  
CREA 0.66* * CA 8.1* No results for input(s): SGOT, GPT, ALT, AP, TBIL, TBILI, TP, ALB, GLOB, GGT, AML, LPSE in the last 72 hours. No lab exists for component: AMYP, HLPSE No results for input(s): INR, PTP, APTT in the last 72 hours. No lab exists for component: INREXT, INREXT Lab Results Component Value Date/Time Color DARK YELLOW 12/31/2018 06:38 AM  
 Appearance CLEAR 12/31/2018 06:38 AM  
 Specific gravity 1.029 12/31/2018 06:38 AM  
 Specific gravity 1.015 10/22/2018 07:03 PM  
 pH (UA) 6.0 12/31/2018 06:38 AM  
 Protein 30 (A) 12/31/2018 06:38 AM  
 Glucose NEGATIVE  12/31/2018 06:38 AM  
 Ketone TRACE (A) 12/31/2018 06:38 AM  
 Bilirubin NEGATIVE  10/19/2018 04:50 PM  
 Urobilinogen 1.0 12/31/2018 06:38 AM  
 Nitrites NEGATIVE  12/31/2018 06:38 AM  
 Leukocyte Esterase NEGATIVE  12/31/2018 06:38 AM  
 Epithelial cells MODERATE (A) 12/31/2018 06:38 AM  
 Bacteria NEGATIVE  12/31/2018 06:38 AM  
 WBC 0-4 12/31/2018 06:38 AM  
 RBC 0-5 12/31/2018 06:38 AM  
 
 
 
Medications Reviewed:  
 
______________________________________________________________________ EXPECTED LENGTH OF STAY: 2d 4h 
 ACTUAL LENGTH OF STAY:          8 Kulwant Walsh MD

## 2019-01-08 VITALS
WEIGHT: 199.74 LBS | RESPIRATION RATE: 16 BRPM | TEMPERATURE: 97.6 F | HEART RATE: 78 BPM | BODY MASS INDEX: 31.35 KG/M2 | OXYGEN SATURATION: 97 % | SYSTOLIC BLOOD PRESSURE: 135 MMHG | DIASTOLIC BLOOD PRESSURE: 85 MMHG | HEIGHT: 67 IN

## 2019-01-08 PROCEDURE — 74011250637 HC RX REV CODE- 250/637: Performed by: INTERNAL MEDICINE

## 2019-01-08 PROCEDURE — 74011250637 HC RX REV CODE- 250/637: Performed by: ORTHOPAEDIC SURGERY

## 2019-01-08 PROCEDURE — 74011250636 HC RX REV CODE- 250/636: Performed by: ORTHOPAEDIC SURGERY

## 2019-01-08 PROCEDURE — 94760 N-INVAS EAR/PLS OXIMETRY 1: CPT

## 2019-01-08 PROCEDURE — 74011250637 HC RX REV CODE- 250/637: Performed by: PHYSICIAN ASSISTANT

## 2019-01-08 RX ORDER — OMEPRAZOLE 40 MG/1
40 CAPSULE, DELAYED RELEASE ORAL 2 TIMES DAILY
Qty: 20 CAP | Refills: 0 | Status: SHIPPED | OUTPATIENT
Start: 2019-01-08

## 2019-01-08 RX ORDER — OMEPRAZOLE 40 MG/1
40 CAPSULE, DELAYED RELEASE ORAL DAILY
Qty: 1 CAP | Refills: 0 | Status: SHIPPED
Start: 2019-01-08 | End: 2019-01-08 | Stop reason: SDUPTHER

## 2019-01-08 RX ORDER — ENOXAPARIN SODIUM 100 MG/ML
40 INJECTION SUBCUTANEOUS DAILY
Qty: 6 SYRINGE | Refills: 0 | Status: SHIPPED | OUTPATIENT
Start: 2019-01-09 | End: 2019-01-08

## 2019-01-08 RX ORDER — FERROUS SULFATE, DRIED 160(50) MG
1 TABLET, EXTENDED RELEASE ORAL
Qty: 1 TAB | Refills: 0 | Status: SHIPPED
Start: 2019-01-08

## 2019-01-08 RX ORDER — ENOXAPARIN SODIUM 100 MG/ML
40 INJECTION SUBCUTANEOUS DAILY
Qty: 6 SYRINGE | Refills: 0 | Status: SHIPPED | OUTPATIENT
Start: 2019-01-09 | End: 2019-01-15

## 2019-01-08 RX ORDER — POLYETHYLENE GLYCOL 3350 17 G/17G
17 POWDER, FOR SOLUTION ORAL
Qty: 5 PACKET | Refills: 0 | Status: SHIPPED | OUTPATIENT
Start: 2019-01-08

## 2019-01-08 RX ORDER — AMLODIPINE BESYLATE 5 MG/1
5 TABLET ORAL DAILY
Qty: 15 TAB | Refills: 0 | Status: SHIPPED | OUTPATIENT
Start: 2019-01-09

## 2019-01-08 RX ORDER — BACLOFEN 10 MG/1
10 TABLET ORAL 3 TIMES DAILY
Qty: 12 TAB | Refills: 0 | Status: SHIPPED
Start: 2019-01-08 | End: 2019-01-08 | Stop reason: SDUPTHER

## 2019-01-08 RX ORDER — BACLOFEN 10 MG/1
10 TABLET ORAL 3 TIMES DAILY
Qty: 15 TAB | Refills: 0 | OUTPATIENT
Start: 2019-01-08 | End: 2019-11-15

## 2019-01-08 RX ORDER — TRAMADOL HYDROCHLORIDE 50 MG/1
50 TABLET ORAL
Qty: 10 TAB | Refills: 0 | Status: SHIPPED | OUTPATIENT
Start: 2019-01-08

## 2019-01-08 RX ORDER — ATORVASTATIN CALCIUM 20 MG/1
20 TABLET, FILM COATED ORAL DAILY
Qty: 15 TAB | Refills: 0 | Status: SHIPPED | OUTPATIENT
Start: 2019-01-08

## 2019-01-08 RX ORDER — ACETAMINOPHEN 325 MG/1
650 TABLET ORAL
Status: SHIPPED | COMMUNITY
Start: 2019-01-08

## 2019-01-08 RX ORDER — ASPIRIN 325 MG
325 TABLET ORAL DAILY
Qty: 14 TAB | Refills: 0 | Status: SHIPPED | OUTPATIENT
Start: 2019-01-16 | End: 2019-01-30

## 2019-01-08 RX ORDER — AMOXICILLIN 250 MG
1 CAPSULE ORAL 2 TIMES DAILY
Qty: 10 TAB | Refills: 0 | Status: SHIPPED | OUTPATIENT
Start: 2019-01-08

## 2019-01-08 RX ADMIN — CALCIUM CARBONATE-VITAMIN D TAB 500 MG-200 UNIT 1 TABLET: 500-200 TAB at 11:29

## 2019-01-08 RX ADMIN — PANTOPRAZOLE SODIUM 40 MG: 40 TABLET, DELAYED RELEASE ORAL at 07:39

## 2019-01-08 RX ADMIN — AMLODIPINE BESYLATE 5 MG: 5 TABLET ORAL at 09:03

## 2019-01-08 RX ADMIN — BACLOFEN 5 MG: 10 TABLET ORAL at 00:20

## 2019-01-08 RX ADMIN — TAMSULOSIN HYDROCHLORIDE 0.4 MG: 0.4 CAPSULE ORAL at 09:04

## 2019-01-08 RX ADMIN — LEVOTHYROXINE SODIUM 150 MCG: 150 TABLET ORAL at 07:39

## 2019-01-08 RX ADMIN — CETIRIZINE HYDROCHLORIDE 10 MG: 10 TABLET, FILM COATED ORAL at 09:04

## 2019-01-08 RX ADMIN — ENOXAPARIN SODIUM 40 MG: 40 INJECTION SUBCUTANEOUS at 09:04

## 2019-01-08 RX ADMIN — ACETAMINOPHEN 650 MG: 325 TABLET ORAL at 11:29

## 2019-01-08 RX ADMIN — ACETAMINOPHEN 650 MG: 325 TABLET ORAL at 00:20

## 2019-01-08 RX ADMIN — DONEPEZIL HYDROCHLORIDE 10 MG: 10 TABLET ORAL at 09:04

## 2019-01-08 RX ADMIN — CALCIUM CARBONATE-VITAMIN D TAB 500 MG-200 UNIT 1 TABLET: 500-200 TAB at 09:04

## 2019-01-08 RX ADMIN — STANDARDIZED SENNA CONCENTRATE AND DOCUSATE SODIUM 1 TABLET: 8.6; 5 TABLET, FILM COATED ORAL at 09:04

## 2019-01-08 RX ADMIN — ACETAMINOPHEN 650 MG: 325 TABLET ORAL at 07:39

## 2019-01-08 RX ADMIN — BACLOFEN 5 MG: 10 TABLET ORAL at 11:29

## 2019-01-08 RX ADMIN — TRAMADOL HYDROCHLORIDE 50 MG: 50 TABLET, FILM COATED ORAL at 11:30

## 2019-01-08 RX ADMIN — QUETIAPINE FUMARATE 25 MG: 25 TABLET ORAL at 09:03

## 2019-01-08 RX ADMIN — BACLOFEN 5 MG: 10 TABLET ORAL at 07:39

## 2019-01-08 RX ADMIN — MEMANTINE 10 MG: 10 TABLET ORAL at 09:04

## 2019-01-08 RX ADMIN — SERTRALINE HYDROCHLORIDE 100 MG: 50 TABLET ORAL at 09:04

## 2019-01-08 NOTE — PROGRESS NOTES
Hospital to SNF SBAR Handoff - Oanh Rich 
                                                                      77 y.o.   male [address] [facility]   Room: [facility] JOINT  Unit Phone# :  [phone] [address] [facility] [address] Dept: [phone] Loc: [phone] SITUATION Admitted:  12/30/2018         Attending Provider:  Ra Clifton MD    
 
Consultations:  IP CONSULT TO ORTHOPEDIC SURGERY 
IP CONSULT TO ORTHOPEDIC SURGERY 
IP CONSULT TO GASTROENTEROLOGY 
 
PCP:  Catherine Dacosta MD   [phone] Treatment Team: Attending Provider: Ra Clifton MD; Consulting Provider: Mohsen Rodrigues; Consulting Provider: Maddy Price MD; Utilization Review: [facility]; Care Manager: Odilon Alonzo BSW; Consulting Provider: Ashley Gagnon MD 
 
Admitting Dx:  S/P right hip fracture Principal Problem: <principal problem not specified> 
 
8 Days Post-Op of  
Procedure(s): RIGHT HIP HEMIARTHROPLASTY BY: Maddy Price MD             ON: 12/31/2018 Code Status: DNR Advance Directives:  
Advance Care Planning 1/3/2019 Confirm Advance Directive Yes, on file (Send w/patient) Yes Not W Pt  
 
 
Isolation:  There are currently no Active Isolations       MDRO: No current active infections Pain Medications given:  Tramadol 50 mg    Last dose: 1/8/2019 at  11:30 Special Equipment needed: no  Type of equipment: 
 
  
  BACKGROUND Allergies: Allergies Allergen Reactions  Exelon [Rivastigmine] Rash Past Medical History:  
Diagnosis Date  Dementia  Hypertension  Hypothyroidism  Pacemaker  Pituitary tumor  Sick sinus syndrome ([facility] [address] Past Surgical History:  
Procedure Laterality Date  HX PACEMAKER Medications Prior to Admission Medication Sig  
  aspirin 81 mg chewable tablet Take 81 mg by mouth daily.  levothyroxine (SYNTHROID) 150 mcg tablet Take 150 mcg by mouth Daily (before breakfast).  QUEtiapine (SEROQUEL) 25 mg tablet Take 25 mg by mouth two (2) times a day.  cetirizine (ZYRTEC) 10 mg tablet Take 10 mg by mouth.  memantine ER (NAMENDA XR) 28 mg capsule Take 28 mg by mouth daily.  donepezil (ARICEPT) 10 mg tablet Take 10 mg by mouth daily.  tamsulosin (FLOMAX) 0.4 mg capsule Take 0.4 mg by mouth daily.  simvastatin (ZOCOR) 40 mg tablet Take 40 mg by mouth daily.  sertraline (ZOLOFT) 100 mg tablet Take 100 mg by mouth daily.  [DISCONTINUED] omeprazole (PRILOSEC) 20 mg capsule Take 20 mg by mouth daily.  [DISCONTINUED] baclofen (LIORESAL) 10 mg tablet Take 0.5 Tabs by mouth three (3) times daily as needed. Hard scripts included in transfer packet yes Vaccinations:   
Immunization History Administered Date(s) Administered  Tdap 09/23/2015 Readmission Risks:Falls  Known Risks: Greater than 72years old The Charlson CoMorbitiy Index tool is an evidenced based tool that has more automatic generated information. The tool looks at many different items such as the age of the patient, how many times they were admitted in the last calendar year, current length of stay in the hospital and their diagnosis. All of these items are pulled automatically from information documented in the chart from various places and will generate a score that predicts whether a patient is at low (less than 13), medium (13-20) or high (21 or greater) risk of being readmitted. ASSESSMENT Temp: 97.6 °F (36.4 °C) (01/08/19 0951) Pulse (Heart Rate): 78 (01/08/19 0951) Resp Rate: 16 (01/08/19 0951)           BP: 135/85 (01/08/19 0951) O2 Sat (%): 97 % (01/08/19 0951) Weight: 90.6 kg (199 lb 11.8 oz)    Height: 5' 7\" (170.2 cm)(per EHR note) (12/31/18 6498) If above not within 1 hour of discharge: 
 
BP:_____  P:____  R:____ T:_____ O2 Sat: ___%  O2: ______ Active Orders Diet DIET FINGER FOOD No options chosen Orientation: only aware of  person Active Behaviors: None Active Lines/Drains:  (Peg Tube / Scott / CL or S/L?): no 
 
Urinary Status: Incontinent briefs     Last BM: Last Bowel Movement Date: 01/08/18 Skin Integrity: Incision (comment)(right hip) Wound Hip Right-DRESSING STATUS: Changed per order Wound Hip Right-DRESSING TYPE: ABD pad Mobility: Slightly limited Weight Bearing Status: WBAT (Weight Bearing as Tolerated) Gait Training Assistive Device: Gait belt, Walker, rolling Ambulation - Level of Assistance: Maximum assistance, Assist x2(+ daughter in front to encourage and help motivate) Distance (ft): 1 Feet (ft)(pivoting L foot on floor. Unable to move BLE's w/o TC's) Lab Results Component Value Date/Time Glucose 114 (H) 01/06/2019 02:41 AM  
 INR 1.1 12/31/2018 02:54 AM  
 INR 1.1 12/30/2018 08:22 PM  
 HGB 10.9 (L) 01/06/2019 02:41 AM  
 HGB 10.8 (L) 01/02/2019 05:10 AM  
  
  RECOMMENDATION See After Visit Summary (AVS) for: · Discharge instructions · [facility] · Special equipment needed (entered pre-discharge by Care Management) · Medication Reconciliation · Follow up Appointment(s) Report given/sent by:  Teo Daly Verbal report given to: Vinayak Merchant  FAXED to:  Haylee Mauro Estimated discharge time:  1/8/2019 at 11:00

## 2019-01-08 NOTE — PROGRESS NOTES
Spiritual Care Partner Volunteer visited patient in room 560/01 on 1.08.19. Documented by: : Rev. Grazyna Solorzano. Berkley [place]; [facility], to contact [phone] [address] call: [phone]

## 2019-01-08 NOTE — PROGRESS NOTES
The patient will discharge today back to [facility] at 11am via [facility]. CRM left message with Lux Waters at the facility to confirm the discharge for today. [phone] and faxed the discharge AVS and Scripts to [phone]. The discharge folder is located on the hard chart with report # [phone] and [facility] P[facility] form. Nursing will need to send completed AVS, Scripts, Kardex, and Mars with the patient. Giorgio Nguyễn RN is aware. The daughter Brianna Merritt is also aware of the discharge time and plan. [facility] has ordered a WC from [facility]. They plan to deliver before 11am today. HAMMAD NICHOLS spoke with Lux Waters with [facility] to confirm the discharge plan.  HAMMAD

## 2019-01-08 NOTE — PROGRESS NOTES
Problem: Falls - Risk of 
Goal: *Absence of Falls Document Sangeeta Fearing Fall Risk and appropriate interventions in the flowsheet. Outcome: Progressing Towards Goal 
Fall Risk Interventions: 
Mobility Interventions: Patient to call before getting OOB, Bed/chair exit alarm Mentation Interventions: Door open when patient unattended, Adequate sleep, hydration, pain control, Evaluate medications/consider consulting pharmacy Medication Interventions: Teach patient to arise slowly Elimination Interventions: Bed/chair exit alarm, Call light in reach History of Falls Interventions: Evaluate medications/consider consulting pharmacy Problem: Pressure Injury - Risk of 
Goal: *Prevention of pressure injury Document Jules Scale and appropriate interventions in the flowsheet. Outcome: Progressing Towards Goal 
Pressure Injury Interventions: 
Sensory Interventions: Assess changes in LOC Moisture Interventions: Absorbent underpads Activity Interventions: Increase time out of bed, Pressure redistribution bed/mattress(bed type) Mobility Interventions: Pressure redistribution bed/mattress (bed type), Turn and reposition approx. every two hours(pillow and wedges) Nutrition Interventions: Document food/fluid/supplement intake Friction and Shear Interventions: Lift sheet

## 2019-01-08 NOTE — PROGRESS NOTES
ORTH FRACTURE PROGRESS NOTE 2019 Admit Date:  
2018 Post Op day: 8 Days Post-Op Subjective:   
Christen Rich remains confused per his baseline. Slow progress with PT but hampered by dementia. Awaiting transfer back to [place] today. No new complaints. Daughter with questions regarding staple removal. 
Tolerating diet Hiccups improved PT/OT:  
Gait:  Gait Base of Support: Narrowed Speed/Angi: Pace decreased (<100 feet/min) Step Length: Right shortened, Left shortened Gait Abnormalities: Decreased step clearance(required verbal and tactile stimulation to move feet/legs) Ambulation - Level of Assistance: Maximum assistance, Assist x2(+ daughter in front to encourage and help motivate) Distance (ft): 1 Feet (ft)(pivoting L foot on floor. Unable to move BLE's w/o TC's) Assistive Device: Gait belt, Walker, rolling Vital Signs:   
Patient Vitals for the past 8 hrs: 
 BP Temp Pulse Resp SpO2 Weight 19 0745      90.6 kg (199 lb 11.8 oz) 19 0259 187/67 97.9 °F (36.6 °C) 76 16 96 % 85.5 kg (188 lb 7.9 oz) Temp (24hrs), Av.9 °F (36.6 °C), Min:97.8 °F (36.6 °C), Max:98 °F (36.7 °C) Pain Control:  
Pain Assessment Pain Scale 1: Visual 
Pain Intensity 1: 0 Pain Location 1: Hip Pain Intervention(s) 1: Medication (see MAR) Meds: 
 
Current Facility-Administered Medications Medication Dose Route Frequency  amLODIPine (NORVASC) tablet 5 mg  5 mg Oral DAILY  simvastatin (ZOCOR) tablet 20 mg  20 mg Oral QHS  pantoprazole (PROTONIX) tablet 40 mg  40 mg Oral ACB&D  
 baclofen (LIORESAL) tablet 5 mg  5 mg Oral Q6H  
 sodium chloride (NS) flush 5-10 mL  5-10 mL IntraVENous Q8H  
 sodium chloride (NS) flush 5-10 mL  5-10 mL IntraVENous PRN  
 naloxone (NARCAN) injection 0.4 mg  0.4 mg IntraVENous PRN  
 calcium-vitamin D (OS-CHERYL) 500 mg-200 unit tablet  1 Tab Oral TID WITH MEALS  
  senna-docusate (PERICOLACE) 8.6-50 mg per tablet 1 Tab  1 Tab Oral BID  polyethylene glycol (MIRALAX) packet 17 g  17 g Oral DAILY  bisacodyl (DULCOLAX) suppository 10 mg  10 mg Rectal DAILY PRN  
 enoxaparin (LOVENOX) injection 40 mg  40 mg SubCUTAneous DAILY  levothyroxine (SYNTHROID) tablet 150 mcg  150 mcg Oral ACB  cetirizine (ZYRTEC) tablet 10 mg  10 mg Oral DAILY  donepezil (ARICEPT) tablet 10 mg  10 mg Oral DAILY  memantine (NAMENDA) tablet 10 mg  10 mg Oral BID  QUEtiapine (SEROquel) tablet 25 mg  25 mg Oral BID  sertraline (ZOLOFT) tablet 100 mg  100 mg Oral DAILY  tamsulosin (FLOMAX) capsule 0.4 mg  0.4 mg Oral DAILY  sodium chloride (NS) flush 5-10 mL  5-10 mL IntraVENous Q8H  
 sodium chloride (NS) flush 5-10 mL  5-10 mL IntraVENous PRN  
 dextrose 5 % - 0.45% NaCl infusion  75 mL/hr IntraVENous CONTINUOUS  
 acetaminophen (TYLENOL) tablet 650 mg  650 mg Oral Q4H PRN  
 ondansetron (ZOFRAN ODT) tablet 4 mg  4 mg Oral Q6H PRN  
 magnesium hydroxide (MILK OF MAGNESIA) 400 mg/5 mL oral suspension 30 mL  30 mL Oral DAILY PRN  
 morphine injection 1 mg  1 mg IntraVENous Q4H PRN  
 acetaminophen (TYLENOL) tablet 650 mg  650 mg Oral Q6H  
 traMADol (ULTRAM) tablet 50 mg  50 mg Oral Q6H PRN  
 HYDROcodone-acetaminophen (NORCO) 5-325 mg per tablet 1 Tab  1 Tab Oral Q4H PRN  
 
 
LAB:   
Recent Labs 01/06/19 
0241 HCT 33.0* HGB 10.9* Transfuse PRBC's:   
 
Assessment & Physician's Comment: 
Dressing is clean, dry, and intact Neurovascular checks within normal limits Orientation:  Disoriented (baseline) Active Problems: S/P right hip fracture (12/30/2018) Plan: 
 
Cont PT/OT Cont current pain management Lovenox for DVT prophylaxis followed by ASA for 2 weeks Isa Amas will need to be removed on day 14 which should be 01/14/2019 Medical management per primary team 
Case Management for disposition planning - [facility] if able Merlin Spurling, PA-C [facility]

## 2019-01-08 NOTE — PROGRESS NOTES
Bedside shift change report given to [facility] (oncoming nurse) by Servando Keith (offgoing nurse). Report included the following information SBAR.

## 2019-01-09 NOTE — NURSE NAVIGATOR
[address] Orthopaedic Pathway Handoff FROM:                                TO: [facility]  [phone] 
                                                    ([address] [place] [address] or Facility name) [address] 
[address] [place] [address] Dept: [phone] Loc: [phone] Room#:  [facility] Nurse Navigator:  Alexia Lawton RN 
  
 
  SITUATION  
  
ASAScore: ASA 3 - Patient with moderate systemic disease with functional limitations Admitted:  12/30/2018  Hospital Day: 10      Attending Provider:  No att. providers found Consultations:  IP CONSULT TO ORTHOPEDIC SURGERY 
IP CONSULT TO ORTHOPEDIC SURGERY 
 
PCP:  Julio Han MD   [phone] Admitting Dx:  S/P right hip fracture Active Problems: S/P right hip fracture (12/30/2018) 9 Days Post-Op of  
Procedure(s): RIGHT HIP HEMIARTHROPLASTY BY: Prateek Grier MD             ON: 12/31/2018 Code Status: Prior             Advance Directive? Yes Not W Pt (Send w/patient) Isolation:  There are currently no Active Isolations       MDRO: No current active infections BACKGROUND Allergies: Allergies Allergen Reactions  Exelon [Rivastigmine] Rash Past Medical History:  
Diagnosis Date  Dementia  Hypertension  Hypothyroidism  Pacemaker  Pituitary tumor  Sick sinus syndrome ([facility] [address] Past Surgical History:  
Procedure Laterality Date  HX PACEMAKER Prior to Admission Medications Prescriptions Last Dose Informant Patient Reported? Taking? QUEtiapine (SEROQUEL) 25 mg tablet   Yes Yes Sig: Take 25 mg by mouth two (2) times a day. acetaminophen (TYLENOL) 325 mg tablet   Yes Yes Sig: Take 2 Tabs by mouth every six (6) hours as needed.   
aspirin 81 mg chewable tablet   Yes No  
 Sig: Take 81 mg by mouth daily. baclofen (LIORESAL) 10 mg tablet   No No  
Sig: Take 0.5 Tabs by mouth three (3) times daily as needed. cetirizine (ZYRTEC) 10 mg tablet   Yes Yes Sig: Take 10 mg by mouth. donepezil (ARICEPT) 10 mg tablet  at AM  Yes Yes Sig: Take 10 mg by mouth daily. levothyroxine (SYNTHROID) 150 mcg tablet   Yes Yes Sig: Take 150 mcg by mouth Daily (before breakfast). memantine ER (NAMENDA XR) 28 mg capsule   Yes Yes Sig: Take 28 mg by mouth daily. omeprazole (PRILOSEC) 20 mg capsule   Yes No  
Sig: Take 20 mg by mouth daily. sertraline (ZOLOFT) 100 mg tablet   Yes Yes Sig: Take 100 mg by mouth daily. simvastatin (ZOCOR) 40 mg tablet   Yes No  
Sig: Take 40 mg by mouth daily. tamsulosin (FLOMAX) 0.4 mg capsule   Yes Yes Sig: Take 0.4 mg by mouth daily. Facility-Administered Medications: None Vaccinations:   
Immunization History Administered Date(s) Administered  Tdap 09/23/2015 ASSESSMENT Age: 77 y.o. Gender: male Height: Height: 5' 7\" (170.2 cm)(per EHR note)                    Weight:Weight: 90.6 kg (199 lb 11.8 oz) No data found. Active Orders There are no active orders of the following type(s): Diet. Orientation: Orientation Level: Oriented to person Active Lines/Drains:  (Peg Tube / Scott / CL or S/L?):no 
 
Urinary Status: Incontinent briefs      Last BM: Last Bowel Movement Date: 01/08/18 Skin Integrity: Incision (comment)(right hip) Wound Hip Right-DRESSING STATUS: Changed per order Wound Hip Right-DRESSING TYPE: ABD pad Mobility: Slightly limited Weight Bearing Status: WBAT (Weight Bearing as Tolerated) Gait Training Assistive Device: Gait belt, Walker, rolling Ambulation - Level of Assistance: Maximum assistance, Assist x2(+ daughter in front to encourage and help motivate) Distance (ft): 1 Feet (ft)(pivoting L foot on floor. Unable to move BLE's w/o TC's) On Anticoagulation? YES  Lovenox  40 mg daily until 1/14 Aspirin 325 mg daily starting on 1/15 Pain Medications given:  Tramadol 50 mg q 6 hours PRN Lab Results Component Value Date/Time Glucose 114 (H) 01/06/2019 02:41 AM  
 INR 1.1 12/31/2018 02:54 AM  
 INR 1.1 12/30/2018 08:22 PM  
 HGB 10.9 (L) 01/06/2019 02:41 AM  
 HGB 10.8 (L) 01/02/2019 05:10 AM  
 HGB 11.7 (L) 01/01/2019 05:00 AM  
 HGB 13.6 12/31/2018 02:54 AM  
 
 
Readmission Risks: 
Score:      
  RECOMMENDATION See After Visit Summary (AVS) for: · Discharge instructions · [facility] · Medication Reconciliation [facility] Orthopaedic Nurse Navigator NIMISHA Romero, RN-BC Office  [phone] Cell      [phone] Fax      [phone] 
[email or web address] Avni [place]

## 2019-01-09 NOTE — DISCHARGE INSTRUCTIONS
Post op Discharge Instructions Partial Hip Replacement   Panfilo Landry MD  [facility]  [phone]    Patient Name: Shayla Jiménez  Date of admission:  12/30/2018  Date of procedure: 12/31/2018   Procedure: Procedure(s):  RIGHT HIP HEMIARTHROPLASTY  PCP: Maico Hahn MD  Date of discharge: 1/8/2019 11:55 AM     Follow up office visit    See Dr. Radha Liu approximately 3-4 weeks from date of surgery. Call [phone] to make an appointment. Activity   Walk with your walker with weight bearing restrictions as instructed by your physical therapist (weight bearing as tolerated on your surgical leg). Continue using your walker until seen for follow-up visit.  Get up frequently and walk (with assistance as needed)   Perform your exercise routine 3 times a day as instructed by the physical therapist.   Harley Price may not drive. Routine Hip Precautions - Maintain for 6 weeks post-surgery date - always get clarification from the physician before discontinuing  o No straight leg raise  o No internal rotation  o No adduction past midline  o No flexion beyond 90 degrees    Incision Care   Keep a dressing on your incision and change daily. Once you incision is not draining, you may leave it open to air  [facility] hands thoroughly before changing the dressing.  You may take a shower when your incision is dry. Do not take a tub bath or go swimming. Amadeo Armstrongyme will need to be removed on Post-op day #14 which should fall on 01/14/19  Dry dressing to wound after staple removal as needed but not required      Preventing blood clots   Lovenox injection 40mg sub q once daily until 14 days post-op then Enteric coated Aspirin 325 mg one tablet once daily for two weeks   Wear elastic stockings (TEDS) for 4 weeks.   Remove them for approximately 1 hour daily for showering/sponge bathing    Pain management   Take pain medication as prescribed; decrease the amount you take as your pain lessens   Avoid alcoholic beverages while taking pain medications   Place an ice bag on the hip for 15-20 minutes after exercising and as needed throughout the day and night    Diet   Resume usual diet; encourage fluids; eat foods high in fiber, calcium and vitamin D   You may want to take a stool softener (such as Senokot-S or Colace) to prevent constipation while you are taking pain medication.  If constipation occurs, take a laxative (such as Dulcolax tablets, Miralax, or a suppository)      When to call your Orthopaedic Surgeon. If you call after 5pm or on a weekend, the on call physician will be contacted   Pain that is not relieved by pain medication, ice, activity   Signs of infection  o Incision is reddened  o Incision continues to drain; drainage has an odor  o Persistent fever over 101 degrees   Signs of a blood clot in your leg  o Calf pain, tenderness, redness and/or swelling of lower leg    When to call your Primary Care Physician   Concerns about medical conditions such as diabetes, high blood pressure, asthma, congestive heart failure   Call if blood sugars are elevated, persistent headache or dizziness, coughing or congestion, constipation or diarrhea, burning with urination, abnormal heart rate (slow or fast)    When to call 911 and go to the nearest emergency room   Acute onset of chest pain, shortness of breath, difficulty breathing         Discharge SNF/Rehab Instructions/LTAC       PATIENT ID: Gerald Ngo  MRN: [id number]   YOB: 1940    DATE OF ADMISSION: 12/30/2018  6:27 PM    DATE OF DISCHARGE: 1/8/2019    PRIMARY CARE PROVIDER: Chase Gunter MD       ATTENDING PHYSICIAN: No att. providers found  DISCHARGING PROVIDER: Robinson Berg MD     To contact this individual call [phone] and ask the  to page. If unavailable ask to be transferred the Adult Hospitalist Department.     CONSULTATIONS: IP CONSULT TO ORTHOPEDIC SURGERY  IP CONSULT TO ORTHOPEDIC SURGERY    PROCEDURES/SURGERIES: Procedure(s):  RIGHT HIP HEMIARTHROPLASTY    [address] COURSE:   48-year-old gentleman with a past medical history significant for advanced Alzheimer dementia, primary hypertension, hypothyroidism, history of pacemaker placement for sick sinus syndrome without any battery life for the past year and a pituitary tumor, was brought to the emergency room by EMS from the [facility] facility for fall with right hip pain. Hx of fall and right hip fracture s/p right hemiarthroplasty on 12/21/2018   -no complaints of pain  -on pain and bowel regimen  -Ortho following -staples removal on 1/14/2019  -s/q lovenox till 1/14/2019 and then 325 mg aspirin for 14 days -DVT prophylaxis. -PT/OT        History of pacemaker placement for sick sinus syndrome without any battery life in the past year  -per prior notes - Cardiology and family  aware     Hiccups on 1/1/19  -he is on 10 mg baclofen TID,if the hiccups are controlled can  Slowly wean off. Follow up with PCP or GI -  -omeprazole increased to 40 mg BID    Hx of dyslipidemia/hyperlipidemia  -on statin     Hx of hypothyroidism  -on replacement     History of a pituitary tumor     Alzheimer's dementia without any current behavioral disturbances.    -on Aricept and memantine and other home meds          PENDING TEST RESULTS:   At the time of discharge the following test results are still pending: none    Xr Chest Sngl V    Result Date: 12/30/2018  IMPRESSION: Clear lungs. Xr Hip Rt W Or Wo Pelv 2-3 Vws    Result Date: 12/30/2018  IMPRESSION: There is a right femoral neck fracture which appears to be a subcapital femoral neck fracture. Ct Low Ext Rt Wo Cont    Result Date: 12/30/2018  IMPRESSION: Comminuted, angulated, and superiorly displaced, right femoral neck fracture. Xr Hip Rt W Or Wo Pelv  1 Vw    Result Date: 12/31/2018  IMPRESSION: Right hip hemiarthroplasty in progress.  INTERPRETATION PROVIDED FOR COMPLIANCE ONLY AT NO CHARGE FOLLOW UP APPOINTMENTS:    Follow-up Information     Follow up With Specialties Details Why Contact Info    Lucia Moffett MD [facility] In 3 days  [address] [place] [id number] [facility]      Arjun Motta MD Gastroenterology In 1 week for hicupps [place]  [address]      Raymond Batres MD Orthopedic Surgery In 2 weeks  [address] [facility]  [address] [place] [address]  [phone]             ADDITIONAL CARE RECOMMENDATIONS:  1)As you have a recent surgery - to prevent blood clots - you should take blood thinner lovenox 40 mg daily till 1/15/2019 and then take 325 mg aspirin from 1/15/2019 to 1/30/2018.  2)follow up with PCP and ortho team      DIET: Regular Diet      ACTIVITY: Activity as tolerated    WOUND CARE: Staples removal on 1/14/2018          DISCHARGE MEDICATIONS:   See Medication Reconciliation Form      NOTIFY YOUR PHYSICIAN FOR ANY OF THE FOLLOWING:   Fever over 101 degrees for 24 hours. Chest pain, shortness of breath, fever, chills, nausea, vomiting, diarrhea, change in mentation, falling, weakness, bleeding. Severe pain or pain not relieved by medications. Or, any other signs or symptoms that you may have questions about. DISPOSITION:    Home With:   OT  PT  HH  RN       SNF/Inpatient Rehab/LTAC    Independent/assisted living    Hospice    Other:       PATIENT CONDITION AT DISCHARGE:     Functional status    Poor    X Deconditioned     Independent      Cognition     Lucid     Forgetful    X Dementia      Catheters/lines (plus indication)    Scott     PICC     PEG    X None      Code status     Full code    X DNR      PHYSICAL EXAMINATION AT DISCHARGE:  Gen: comfortable,no distress  Resp:CTA b/l  Card:S1. S2 wnl  Abd: soft and non tender  Musculoskeletal: RLE -staples present,incision looks dry and clean.   Neuro: has underlying dementia,was able to move extremities      CHRONIC MEDICAL DIAGNOSES:  Problem List as of 1/8/2019 Never Reviewed          Codes Class Noted - Resolved    S/P right hip fracture ICD-10-CM: Z87.81  ICD-9-CM: V15.51  12/30/2018 - Present                  Signed:   Jade Raymond MD  1/8/2019  9:51 AM

## 2019-01-09 NOTE — DISCHARGE SUMMARY
Discharge Summary       PATIENT ID: Polly Mccollum  MRN: [id number]   YOB: 1940    DATE OF ADMISSION: 12/30/2018  6:27 PM    DATE OF DISCHARGE: 1/8/2019   PRIMARY CARE PROVIDER: MD Heath Cohen MD    DISCHARGING PROVIDER: Kristan Jean-Baptiste MD    To contact this individual call [phone] and ask the  to page. If unavailable ask to be transferred the Adult Hospitalist Department. CONSULTATIONS: IP CONSULT TO ORTHOPEDIC SURGERY  IP CONSULT TO ORTHOPEDIC SURGERY    PROCEDURES/SURGERIES: Procedure(s):  RIGHT HIP HEMIARTHROPLASTY    [address] COURSE:   69-year-old gentleman with a past medical history significant for advanced Alzheimer dementia, primary hypertension, hypothyroidism, history of pacemaker placement for sick sinus syndrome without any battery life for the past year and a pituitary tumor, was brought to the emergency room by EMS from the [facility] for fall with right hip pain. Hx of fall and right hip fracture s/p right hemiarthroplasty on 12/31/2018  -no complaints of pain  -on pain meds  and bowel regimen  -Ortho following -staples removal on 1/14/2019  -s/q lovenox till 1/14/2019 and then 325 mg aspirin for 14 days -DVT prophylaxis. -PT/OT -OP ortho follow up.       History of pacemaker placement for sick sinus syndrome without any battery life in the past year  -patient's OP cardiologist and family aware of it.     Hiccups on 1/1/19  -he is on 10 mg baclofen TID,if the hiccups are controlled can  Slowly wean off. Follow up with PCP or GI -  -omeprazole increased to 40 mg BID    Hx of dyslipidemia/hyperlipidemia  -on statin     Hx of hypothyroidism  -on replacement     History of a pituitary tumor     Alzheimer's dementia without any current behavioral disturbances.    -on Aricept and memantine and other home meds      PENDING TEST RESULTS:   At the time of discharge the following test results are still pending: none  Xr Chest Sngl V    Result Date: 12/30/2018  IMPRESSION: Clear lungs. Xr Hip Rt W Or Wo Pelv 2-3 Vws    Result Date: 12/30/2018  IMPRESSION: There is a right femoral neck fracture which appears to be a subcapital femoral neck fracture. Ct Low Ext Rt Wo Cont    Result Date: 12/30/2018  IMPRESSION: Comminuted, angulated, and superiorly displaced, right femoral neck fracture. Xr Hip Rt W Or Wo Pelv  1 Vw    Result Date: 12/31/2018  IMPRESSION: Right hip hemiarthroplasty in progress. INTERPRETATION PROVIDED FOR COMPLIANCE ONLY AT NO CHARGE          FOLLOW UP APPOINTMENTS:    Follow-up Information     Follow up With Specialties Details Why Contact Info    Elvin Howard MD [facility] In 3 days  [address] [facility]      Yadira Christianson MD Gastroenterology In 1 week for hicupps [address] Katiuska Camara MD Orthopedic Surgery In 2 weeks  [facility] [address]  [phone]           ADDITIONAL CARE RECOMMENDATIONS:  1)As you have a recent surgery - to prevent blood clots - you should take blood thinner lovenox 40 mg daily till 1/15/2019 and then take 325 mg aspirin from 1/15/2019 to 1/30/2018.  2)follow up with PCP and ortho team      DIET: Regular Diet      ACTIVITY: Activity as tolerated    WOUND CARE: Staples removal on 1/14/2018        DISCHARGE MEDICATIONS:  Discharge Medication List as of 1/7/2019 11:18 AM      CONTINUE these medications which have NOT CHANGED    Details   levothyroxine (SYNTHROID) 150 mcg tablet Take 150 mcg by mouth Daily (before breakfast). , Historical Med      QUEtiapine (SEROQUEL) 25 mg tablet Take 25 mg by mouth two (2) times a day., Historical Med      cetirizine (ZYRTEC) 10 mg tablet Take 10 mg by mouth., Historical Med      memantine ER (NAMENDA XR) 28 mg capsule Take 28 mg by mouth daily. , Historical Med      donepezil (ARICEPT) 10 mg tablet Take 10 mg by mouth daily. , Historical Med      tamsulosin (FLOMAX) 0.4 mg capsule Take 0.4 mg by mouth daily. , Historical Med      sertraline (ZOLOFT) 100 mg tablet Take 100 mg by mouth daily. , Historical Med      aspirin 81 mg chewable tablet Take 81 mg by mouth daily. , Historical Med      omeprazole (PRILOSEC) 20 mg capsule Take 20 mg by mouth daily. , Historical Med      baclofen (LIORESAL) 10 mg tablet Take 0.5 Tabs by mouth three (3) times daily as needed. , Print, Disp-12 Tab, R-0      simvastatin (ZOCOR) 40 mg tablet Take 40 mg by mouth daily. , Historical Med         STOP taking these medications       aspirin delayed-release 81 mg tablet Comments:   Reason for Stopping:                 NOTIFY YOUR PHYSICIAN FOR ANY OF THE FOLLOWING:   Fever over 101 degrees for 24 hours. Chest pain, shortness of breath, fever, chills, nausea, vomiting, diarrhea, change in mentation, falling, weakness, bleeding. Severe pain or pain not relieved by medications. Or, any other signs or symptoms that you may have questions about. DISPOSITION:    Home With:   OT  PT  HH  RN       Long term SNF/Inpatient Rehab   X Independent/assisted living    Hospice    Other:       PATIENT CONDITION AT DISCHARGE:     Functional status    Poor     Deconditioned     Independent      Cognition     Lucid     Forgetful     Dementia      Catheters/lines (plus indication)    Scott     PICC     PEG     None      Code status     Full code    X DNR      PHYSICAL EXAMINATION AT DISCHARGE:  Gen: comfortable,no distress  Resp:CTA b/l  Card:S1. S2 wnl  Abd: soft and non tender  Musculoskeletal: RLE -staples present,incision looks dry and clean.   Neuro: has underlying dementia,was able to move extremities       CHRONIC MEDICAL DIAGNOSES:  Problem List as of 1/8/2019 Never Reviewed          Codes Class Noted - Resolved    S/P right hip fracture ICD-10-CM: Z87.81  ICD-9-CM: V15.51  12/30/2018 - Present              Greater than 45 minutes were spent with the patient on counseling and coordination of care    Signed:   Santhosh Owens MD  1/8/2019  9:09 PM

## 2019-01-10 ENCOUNTER — DOCUMENTATION ONLY (OUTPATIENT)
Dept: CASE MANAGEMENT | Age: 79
End: 2019-01-10

## 2019-01-11 ENCOUNTER — DOCUMENTATION ONLY (OUTPATIENT)
Dept: CASE MANAGEMENT | Age: 79
End: 2019-01-11

## 2019-01-11 NOTE — PROGRESS NOTES
CM called pt's daughter Alfa Gu,  to ensure that she had gotten follow up with both [address]  [facility] [facility] and also [facility]. [address] [address] has denied pt and currently plan is for them to continue care with [facility] and if they want to switch to [facility], Clint Zimmerman will call [facility] on Monday and they will start care on Tuesday. Pt is currently not advancing with mobility at home. Discussed where to rent a hospital bed if needed short term. Also discussed other IPR referrals vs SNF. Clint Zimmerman not interested in SNF but agreed for [facility] rehab to receive referral.  Speaking with [facility] liasons regarding referral and will ask that they reach out to Clint Zimmerman. Referral sent to [facility] rehab via [email or web address].     MARY ANNE Garces

## 2019-01-11 NOTE — PROGRESS NOTES
CM received call from [facility] who has received referral and evaluating pt for inpt rehab, communicating directly with family, daughter Gianfranco Choi as pt is at memory care in [facility] and discharged from here 1/8/2019. CM also received call from Simeon Mar and updated her that [facility] [address] started service but Gianfranco Choi requested to switch to [facility]. She advised she was notifying [address]. [facility] has this referral \"pended\" d/t ongoing evaluation/referral from [facility] IPR as well as pending snow this weekend. Soonest they could get there is Monday. Simeon Mar is calling daughter directly to discuss these issues and ensure [facility] is aware of outcome of IPR referral as well as potential delay of care with [facility] d/t weekend snow anticipated. Amari will call this CM back if further work needs to be done on this pt for homecare.     MARY ANNE Fernández

## 2019-11-15 ENCOUNTER — APPOINTMENT (OUTPATIENT)
Dept: GENERAL RADIOLOGY | Age: 79
End: 2019-11-15
Attending: EMERGENCY MEDICINE
Payer: MEDICARE

## 2019-11-15 ENCOUNTER — HOSPITAL ENCOUNTER (EMERGENCY)
Age: 79
Discharge: HOME | End: 2019-11-15
Attending: EMERGENCY MEDICINE
Payer: MEDICARE

## 2019-11-15 VITALS
RESPIRATION RATE: 18 BRPM | WEIGHT: 176.37 LBS | DIASTOLIC BLOOD PRESSURE: 96 MMHG | OXYGEN SATURATION: 96 % | BODY MASS INDEX: 27.62 KG/M2 | TEMPERATURE: 100.4 F | SYSTOLIC BLOOD PRESSURE: 147 MMHG | HEART RATE: 63 BPM
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
## 2019-11-15 DIAGNOSIS — R06.6 SINGULTUS: ICD-10-CM

## 2019-11-15 DIAGNOSIS — J20.9 ACUTE BRONCHITIS, UNSPECIFIED ORGANISM: ICD-10-CM

## 2019-11-15 DIAGNOSIS — R50.9 FEBRILE ILLNESS: Primary | ICD-10-CM

## 2019-11-15 DIAGNOSIS — H10.021 OTHER MUCOPURULENT CONJUNCTIVITIS OF RIGHT EYE: ICD-10-CM

## 2019-11-15 DIAGNOSIS — F03.90 DEMENTIA WITHOUT BEHAVIORAL DISTURBANCE, UNSPECIFIED DEMENTIA TYPE: ICD-10-CM

## 2019-11-15 LAB
ALBUMIN SERPL-MCNC: 3.3 G/DL (ref 3.5–5)
ALBUMIN/GLOB SERPL: 0.9 {RATIO} (ref 1.1–2.2)
ALP SERPL-CCNC: 104 U/L (ref 45–117)
ALT SERPL-CCNC: 36 U/L (ref 12–78)
ANION GAP SERPL CALC-SCNC: 10 MMOL/L (ref 5–15)
APPEARANCE UR: CLEAR
AST SERPL-CCNC: 20 U/L (ref 15–37)
BACTERIA URNS QL MICRO: ABNORMAL /HPF
BASOPHILS # BLD: 0 K/UL (ref 0–0.1)
BASOPHILS NFR BLD: 0 % (ref 0–1)
BILIRUB SERPL-MCNC: 0.6 MG/DL (ref 0.2–1)
BILIRUB UR QL: NEGATIVE
BUN SERPL-MCNC: 18 MG/DL (ref 6–20)
BUN/CREAT SERPL: 20 (ref 12–20)
CALCIUM SERPL-MCNC: 8.8 MG/DL (ref 8.5–10.1)
CHLORIDE SERPL-SCNC: 103 MMOL/L (ref 97–108)
CO2 SERPL-SCNC: 27 MMOL/L (ref 21–32)
COLOR UR: ABNORMAL
COMMENT, HOLDF: NORMAL
CREAT SERPL-MCNC: 0.89 MG/DL (ref 0.7–1.3)
DIFFERENTIAL METHOD BLD: ABNORMAL
EOSINOPHIL # BLD: 0 K/UL (ref 0–0.4)
EOSINOPHIL NFR BLD: 0 % (ref 0–7)
EPITH CASTS URNS QL MICRO: ABNORMAL /LPF
ERYTHROCYTE [DISTWIDTH] IN BLOOD BY AUTOMATED COUNT: 12.8 % (ref 11.5–14.5)
FLUAV AG NPH QL IA: NEGATIVE
FLUBV AG NOSE QL IA: NEGATIVE
GLOBULIN SER CALC-MCNC: 3.5 G/DL (ref 2–4)
GLUCOSE SERPL-MCNC: 130 MG/DL (ref 65–100)
GLUCOSE UR STRIP.AUTO-MCNC: NEGATIVE MG/DL
HCT VFR BLD AUTO: 40.1 % (ref 36.6–50.3)
HGB BLD-MCNC: 13.5 G/DL (ref 12.1–17)
HGB UR QL STRIP: NEGATIVE
IMM GRANULOCYTES # BLD AUTO: 0.1 K/UL (ref 0–0.04)
IMM GRANULOCYTES NFR BLD AUTO: 1 % (ref 0–0.5)
KETONES UR QL STRIP.AUTO: NEGATIVE MG/DL
LACTATE SERPL-SCNC: 0.8 MMOL/L (ref 0.4–2)
LEUKOCYTE ESTERASE UR QL STRIP.AUTO: NEGATIVE
LYMPHOCYTES # BLD: 1.1 K/UL (ref 0.8–3.5)
LYMPHOCYTES NFR BLD: 10 % (ref 12–49)
MCH RBC QN AUTO: 30.8 PG (ref 26–34)
MCHC RBC AUTO-ENTMCNC: 33.7 G/DL (ref 30–36.5)
MCV RBC AUTO: 91.6 FL (ref 80–99)
MONOCYTES # BLD: 1.2 K/UL (ref 0–1)
MONOCYTES NFR BLD: 11 % (ref 5–13)
NEUTS SEG # BLD: 8.7 K/UL (ref 1.8–8)
NEUTS SEG NFR BLD: 78 % (ref 32–75)
NITRITE UR QL STRIP.AUTO: NEGATIVE
NRBC # BLD: 0 K/UL (ref 0–0.01)
NRBC BLD-RTO: 0 PER 100 WBC
PH UR STRIP: 5.5 [PH] (ref 5–8)
PLATELET # BLD AUTO: 170 K/UL (ref 150–400)
PMV BLD AUTO: 10.6 FL (ref 8.9–12.9)
POTASSIUM SERPL-SCNC: 3.9 MMOL/L (ref 3.5–5.1)
PROCALCITONIN SERPL-MCNC: <0.1 NG/ML
PROT SERPL-MCNC: 6.8 G/DL (ref 6.4–8.2)
PROT UR STRIP-MCNC: ABNORMAL MG/DL
RBC # BLD AUTO: 4.38 M/UL (ref 4.1–5.7)
RBC #/AREA URNS HPF: ABNORMAL /HPF (ref 0–5)
SAMPLES BEING HELD,HOLD: NORMAL
SODIUM SERPL-SCNC: 140 MMOL/L (ref 136–145)
SP GR UR REFRACTOMETRY: 1.03 (ref 1–1.03)
UROBILINOGEN UR QL STRIP.AUTO: 0.2 EU/DL (ref 0.2–1)
WBC # BLD AUTO: 11.2 K/UL (ref 4.1–11.1)
WBC URNS QL MICRO: ABNORMAL /HPF (ref 0–4)

## 2019-11-15 PROCEDURE — 84145 PROCALCITONIN (PCT): CPT

## 2019-11-15 PROCEDURE — 85025 COMPLETE CBC W/AUTO DIFF WBC: CPT

## 2019-11-15 PROCEDURE — 81001 URINALYSIS AUTO W/SCOPE: CPT

## 2019-11-15 PROCEDURE — 87040 BLOOD CULTURE FOR BACTERIA: CPT

## 2019-11-15 PROCEDURE — 83605 ASSAY OF LACTIC ACID: CPT

## 2019-11-15 PROCEDURE — 80053 COMPREHEN METABOLIC PANEL: CPT

## 2019-11-15 PROCEDURE — 74011250636 HC RX REV CODE- 250/636: Performed by: EMERGENCY MEDICINE

## 2019-11-15 PROCEDURE — 87086 URINE CULTURE/COLONY COUNT: CPT

## 2019-11-15 PROCEDURE — 36415 COLL VENOUS BLD VENIPUNCTURE: CPT

## 2019-11-15 PROCEDURE — 74011250637 HC RX REV CODE- 250/637: Performed by: EMERGENCY MEDICINE

## 2019-11-15 PROCEDURE — 71046 X-RAY EXAM CHEST 2 VIEWS: CPT

## 2019-11-15 PROCEDURE — 99285 EMERGENCY DEPT VISIT HI MDM: CPT

## 2019-11-15 PROCEDURE — 87804 INFLUENZA ASSAY W/OPTIC: CPT

## 2019-11-15 PROCEDURE — 93005 ELECTROCARDIOGRAM TRACING: CPT

## 2019-11-15 RX ORDER — POLYMYXIN B SULFATE AND TRIMETHOPRIM 1; 10000 MG/ML; [USP'U]/ML
1 SOLUTION OPHTHALMIC EVERY 4 HOURS
Qty: 10 ML | Refills: 0 | Status: SHIPPED | OUTPATIENT
Start: 2019-11-15

## 2019-11-15 RX ORDER — BACLOFEN 10 MG/1
5 TABLET ORAL
Status: COMPLETED | OUTPATIENT
Start: 2019-11-15 | End: 2019-11-15

## 2019-11-15 RX ORDER — BACLOFEN 5 MG/1
2 TABLET ORAL
Qty: 42 TAB | Refills: 0 | Status: SHIPPED | OUTPATIENT
Start: 2019-11-15 | End: 2019-11-22

## 2019-11-15 RX ADMIN — BACLOFEN 5 MG: 10 TABLET ORAL at 15:46

## 2019-11-15 RX ADMIN — SODIUM CHLORIDE 1000 ML: 900 INJECTION, SOLUTION INTRAVENOUS at 14:12

## 2019-11-15 NOTE — ED NOTES
Catheterization performed for ua specimen. Pt's depend changed at that time. Pt's daughter at bedside. Pt placed in a position of comfort and given pillow. Will continue to monitor.

## 2019-11-15 NOTE — ED PROVIDER NOTES
79-year-old male with history of dementia, hypertension, hypothyroidism, pituitary tumor, and sick sinus syndrome with a nonfunctional pacemaker is here with his daughter after developing a fever, cough, and falling out of bed earlier today. He lives at an assisted living facility and he developed a cough a couple days ago. An x-ray showed no acute process and he was started on guaifenesin. He also developed hiccups around this time and has them still. He had hiccups once after hip surgery that lasted for a few weeks and resolved on its own. He also had hiccups another time for extended. That also resolved on its own, though they had tried baclofen and either Compazine or Thorazine with limited success. Today he developed fever of 101 or 102 degrees and somehow fell out of bed. He has a small abrasion to his right elbow, but his daughter did not see any other evidence of trauma and the patient was ambulatory. He seems to be at his baseline according to her. She tells me he is not a full code and if you were to have intracranial hemorrhage or other serious pathology she may not pursue treatment. Her main concern is if he were to be septic. If he has no evidence of sepsis and the facility can take care of him, than she would like to have him go back to his assisted living facility. He also developed discharge of the right eye today. Past Medical History:   Diagnosis Date    Dementia ([address]     Hypertension     Hypothyroidism     Pacemaker     Pituitary tumor     Sick sinus syndrome ([facility] [address]        Past Surgical History:   Procedure Laterality Date    HX PACEMAKER           History reviewed. No pertinent family history.     Social History     Socioeconomic History    Marital status:      Spouse name: Not on file    Number of children: Not on file    Years of education: Not on file    Highest education level: Not on file   Occupational History    Not on file   Social Needs    Financial resource strain: Not on file    Food insecurity:     Worry: Not on file     Inability: Not on file    Transportation needs:     Medical: Not on file     Non-medical: Not on file   Tobacco Use    Smoking status: Never Smoker    Smokeless tobacco: Never Used   Substance and Sexual Activity    Alcohol use: No     Frequency: Never    Drug use: No    Sexual activity: Not on file   Lifestyle    Physical activity:     Days per week: Not on file     Minutes per session: Not on file    Stress: Not on file   Relationships    Social connections:     Talks on phone: Not on file     Gets together: Not on file     Attends [employer] service: Not on file     Active member of club or organization: Not on file     Attends meetings of clubs or organizations: Not on file     Relationship status: Not on file    Intimate partner violence:     Fear of current or ex partner: Not on file     Emotionally abused: Not on file     Physically abused: Not on file     Forced sexual activity: Not on file   Other Topics Concern    Not on file   Social History Narrative    Not on file         ALLERGIES: Exelon [rivastigmine]    Review of Systems   Constitutional: Positive for fever. HENT: Negative for trouble swallowing. Eyes: Negative for visual disturbance. Respiratory: Positive for cough. Cardiovascular: Negative for chest pain. Gastrointestinal: Negative for abdominal pain. Genitourinary: Negative for difficulty urinating. Musculoskeletal: Negative for gait problem. Skin: Negative for rash. Neurological: Negative for headaches. Hematological: Does not bruise/bleed easily. Psychiatric/Behavioral: Negative for sleep disturbance.        Vitals:    11/15/19 1200 11/15/19 1215 11/15/19 1245   BP: (!) 151/95 154/69 160/79   Pulse: 73     Resp: 16     Temp: 98.7 °F (37.1 °C)     SpO2: 95% 96% 96%   Weight: 80 kg (176 lb 5.9 oz)              Physical Exam   Constitutional: He appears well-developed and well-nourished. HENT:   Head: Normocephalic and atraumatic. Eyes: Right eye exhibits discharge. Neck: Normal range of motion. Cardiovascular: Normal rate and intact distal pulses. Pulmonary/Chest: Effort normal. No respiratory distress. Transmitted upper airway noises   Abdominal: Soft. Bowel sounds are normal. There is no tenderness. There is no rebound and no guarding. Singultus   Musculoskeletal: Normal range of motion. He exhibits no deformity. Neurological: He is alert. Skin: Skin is warm and dry. Nursing note and vitals reviewed. MDM  Number of Diagnoses or Management Options  Diagnosis management comments: Nonseptic appearing 24-year-old male with severe dementia that significantly limits history and physical exam.  That said, he has been coughing, has developed fever, and has conjunctivitis of the right eye along with singultus. This may well be a viral infection, but I appreciate the daughter's concern for sepsis and plan to look with the chest x-ray, urinalysis, and basic labs. We discussed a head CT and decided against it because she would not pursue surgery should he have intracranial hemorrhage. She understands is possible he could have a subdural hematoma or other injury. We discussed imaging the abdomen to look for sources of his singultus, but we decided to defer that until labs came back.     His EKG that was done at 1210 and shows what looks to be a sinus rhythm, but is difficult to tell because of all the motion artifact  Axis is normal  CA and QRS are within normal limits, but QTc is mildly prolonged at 462 ms  No obvious ST changes         Procedures

## 2019-11-15 NOTE — ED NOTES
Patient was discharged and pt's daughter given instructions by Dr. Avery Metzger. Patient's daughter verbalized good understanding of all discharge instructions, prescriptions and f/u care. All questions answered. Pt in stable condition on discharge. Escorted out by wheelchair.

## 2019-11-15 NOTE — ED TRIAGE NOTES
Pt lives at [address] [facility] [address] and daughter was called due to the patient having a fever of 101 this am and discharge from his right eye and a cough

## 2019-11-15 NOTE — DISCHARGE INSTRUCTIONS
Patient Education        Bronchitis: Care Instructions  Your Care Instructions    Bronchitis is inflammation of the bronchial tubes, which carry air to the lungs. The tubes swell and produce mucus, or phlegm. The mucus and inflamed bronchial tubes make you cough. You may have trouble breathing. Most cases of bronchitis are caused by viruses like those that cause colds. Antibiotics usually do not help and they may be harmful. Bronchitis usually develops rapidly and lasts about 2 to 3 weeks in otherwise healthy people. Follow-up care is a key part of your treatment and safety. Be sure to make and go to all appointments, and call your doctor if you are having problems. It's also a good idea to know your test results and keep a list of the medicines you take. How can you care for yourself at home? · Take all medicines exactly as prescribed. Call your doctor if you think you are having a problem with your medicine. · Get some extra rest.  · Take an over-the-counter pain medicine, such as acetaminophen (Tylenol), ibuprofen (Advil, Motrin), or naproxen (Aleve) to reduce fever and relieve body aches. Read and follow all instructions on the label. · Do not take two or more pain medicines at the same time unless the doctor told you to. Many pain medicines have acetaminophen, which is Tylenol. Too much acetaminophen (Tylenol) can be harmful. · Take an over-the-counter cough medicine that contains dextromethorphan to help quiet a dry, hacking cough so that you can sleep. Avoid cough medicines that have more than one active ingredient. Read and follow all instructions on the label. · Breathe moist air from a humidifier, hot shower, or sink filled with hot water. The heat and moisture will thin mucus so you can cough it out. · Do not smoke. Smoking can make bronchitis worse. If you need help quitting, talk to your doctor about stop-smoking programs and medicines.  These can increase your chances of quitting for good.  When should you call for help? Call 911 anytime you think you may need emergency care. For example, call if:    · You have severe trouble breathing.    Call your doctor now or seek immediate medical care if:    · You have new or worse trouble breathing.     · You cough up dark brown or bloody mucus (sputum).     · You have a new or higher fever.     · You have a new rash.    Watch closely for changes in your health, and be sure to contact your doctor if:    · You cough more deeply or more often, especially if you notice more mucus or a change in the color of your mucus.     · You are not getting better as expected. Where can you learn more? Go to http://shadyOpanga Networkscruz.info/. Enter H333 in the search box to learn more about \"Bronchitis: Care Instructions. \"  Current as of: June 9, 2019  Content Version: 12.2  © 7506-5795 [employer]. Care instructions adapted under license by [employer] (which disclaims liability or warranty for this information). If you have questions about a medical condition or this instruction, always ask your healthcare professional. [place] [address] any warranty or liability for your use of this information. Patient Education        Pinkeye: Care Instructions  Your Care Instructions    Pinkeye is redness and swelling of the eye surface and the conjunctiva (the lining of the eyelid and the covering of the white part of the eye). Pinkeye is also called conjunctivitis. Pinkeye is often caused by infection with bacteria or a virus. Dry air, allergies, smoke, and chemicals are other common causes. Pinkeye often clears on its own in 7 to 10 days. Antibiotics only help if the pinkeye is caused by bacteria. Pinkeye caused by infection spreads easily. If an allergy or chemical is causing pinkeye, it will not go away unless you can avoid whatever is causing it. Follow-up care is a key part of your treatment and safety.  Be sure to make and go to all appointments, and call your doctor if you are having problems. It's also a good idea to know your test results and keep a list of the medicines you take. How can you care for yourself at home? · Wash your hands often. Always wash them before and after you treat pinkeye or touch your eyes or face. · Use moist cotton or a clean, wet cloth to remove crust. Wipe from the inside corner of the eye to the outside. Use a clean part of the cloth for each wipe. · Put cold or warm wet cloths on your eye a few times a day if the eye hurts. · Do not wear contact lenses or eye makeup until the pinkeye is gone. Throw away any eye makeup you were using when you got pinkeye. Clean your contacts and storage case. If you wear disposable contacts, use a new pair when your eye has cleared and it is safe to wear contacts again. · If the doctor gave you antibiotic ointment or eyedrops, use them as directed. Use the medicine for as long as instructed, even if your eye starts looking better soon. Keep the bottle tip clean, and do not let it touch the eye area. · To put in eyedrops or ointment:  ? Tilt your head back, and pull your lower eyelid down with one finger. ? Drop or squirt the medicine inside the lower lid. ? Close your eye for 30 to 60 seconds to let the drops or ointment move around. ? Do not touch the ointment or dropper tip to your eyelashes or any other surface. · Do not share towels, pillows, or washcloths while you have pinkeye. When should you call for help?   Call your doctor now or seek immediate medical care if:    · You have pain in your eye, not just irritation on the surface.     · You have a change in vision or loss of vision.     · You have an increase in discharge from the eye.     · Your eye has not started to improve or begins to get worse within 48 hours after you start using antibiotics.     · Pinkeye lasts longer than 7 days.    Watch closely for changes in your health, and be sure to contact your doctor if you have any problems. Where can you learn more? Go to http://shady-cruz.info/. Enter Y392 in the search box to learn more about \"Pinkeye: Care Instructions. \"  Current as of: June 26, 2019  Content Version: 12.2  © 6317-6792 [employer]. Care instructions adapted under license by [employer] (which disclaims liability or warranty for this information). If you have questions about a medical condition or this instruction, always ask your healthcare professional. [address] any warranty or liability for your use of this information. Patient Education        Hiccups: Care Instructions  Your Care Instructions    Hiccups occur when a spasm contracts the diaphragm, a large sheet of muscle that separates the chest cavity from the abdominal cavity. The spasm causes an intake of breath that is suddenly stopped by the closure of the vocal cords (glottis). This closure causes the \"hiccup\" sound. A very full stomach can cause hiccups that go away on their own. A full stomach can be caused by things like eating too much food too quickly or swallowing too much air. Most hiccups go away on their own within a few minutes to a few hours and do not require any treatment. Hiccups that last longer than 48 hours are called persistent hiccups. Hiccups that last longer than a month are called intractable hiccups. Both persistent and intractable hiccups may be a sign of a more serious health problem. Follow-up care is a key part of your treatment and safety. Be sure to make and go to all appointments, and call your doctor if you are having problems. It's also a good idea to know your test results and keep a list of the medicines you take. How can you care for yourself at home? · Try these safe and easy home remedies if your hiccups are making you uncomfortable. ? Eat a teaspoon of sugar or honey. ?  Hold your breath and count slowly to 10.  ? Quickly drink a glass of cold water. · If your doctor prescribed medicine, take it as directed. Call your doctor if you think you are having a problem with your medicine. To help prevent hiccups  · Take steps to avoid swallowing air:  ? Eat slowly. Avoid gulping food or beverages. ? Chew your food thoroughly before you swallow. ? Avoid drinking through a straw. ? Avoid chewing gum or eating hard candy. ? Do not smoke or use other tobacco products. ? If you wear dentures, check with a dentist to make sure they fit properly. · Do not eat large meals. · Do not drink alcohol. · Avoid sudden changes in stomach temperature, such as drinking a hot beverage and then a cold beverage. · Avoid emotional stress or excitement. When should you call for help? Call your doctor now or seek immediate medical care if:    · You have trouble swallowing and are unable to swallow food or fluids.     · You have hiccups for more than 2 days.     · You have new symptoms, such as belly pain, constipation, diarrhea, heartburn, or vomiting.    Watch closely for changes in your health, and be sure to contact your doctor if:    · You have trouble swallowing but are able to swallow food and fluids.     · Hiccups occur often and get in the way of your activities.     · You think medicine may be causing your symptoms.     · You do not get better as expected. Where can you learn more? Go to http://shady-cruz.info/. Enter K147 in the search box to learn more about \"Hiccups: Care Instructions. \"  Current as of: June 26, 2019  Content Version: 12.2  © 8419-7982 [employer]. Care instructions adapted under license by [employer] (which disclaims liability or warranty for this information).  If you have questions about a medical condition or this instruction, always ask your healthcare professional. [place] [address] any warranty or liability for your use of this information.

## 2019-11-17 LAB
ATRIAL RATE: 75 BPM
BACTERIA SPEC CULT: NORMAL
CALCULATED P AXIS, ECG09: 61 DEGREES
CALCULATED R AXIS, ECG10: 51 DEGREES
CALCULATED T AXIS, ECG11: 67 DEGREES
CC UR VC: NORMAL
DIAGNOSIS, 93000: NORMAL
P-R INTERVAL, ECG05: 190 MS
Q-T INTERVAL, ECG07: 432 MS
QRS DURATION, ECG06: 88 MS
QTC CALCULATION (BEZET), ECG08: 462 MS
SERVICE CMNT-IMP: NORMAL
VENTRICULAR RATE, ECG03: 69 BPM

## 2019-11-20 LAB
BACTERIA SPEC CULT: NORMAL
SERVICE CMNT-IMP: NORMAL

## 2021-04-02 ENCOUNTER — APPOINTMENT (OUTPATIENT)
Dept: GENERAL RADIOLOGY | Age: 81
End: 2021-04-02
Attending: EMERGENCY MEDICINE
Payer: MEDICARE

## 2021-04-02 ENCOUNTER — HOSPITAL ENCOUNTER (EMERGENCY)
Age: 81
Discharge: HOME | End: 2021-04-02
Attending: EMERGENCY MEDICINE
Payer: MEDICARE

## 2021-04-02 ENCOUNTER — APPOINTMENT (OUTPATIENT)
Dept: CT IMAGING | Age: 81
End: 2021-04-02
Attending: EMERGENCY MEDICINE
Payer: MEDICARE

## 2021-04-02 VITALS
DIASTOLIC BLOOD PRESSURE: 100 MMHG | TEMPERATURE: 98.5 F | SYSTOLIC BLOOD PRESSURE: 145 MMHG | WEIGHT: 168.21 LBS | OXYGEN SATURATION: 96 % | BODY MASS INDEX: 26.35 KG/M2 | RESPIRATION RATE: 17 BRPM | HEART RATE: 48 BPM

## 2021-04-02 DIAGNOSIS — R07.9 ACUTE CHEST PAIN: Primary | ICD-10-CM

## 2021-04-02 DIAGNOSIS — R10.817 GENERALIZED ABDOMINAL TENDERNESS WITHOUT REBOUND TENDERNESS: ICD-10-CM

## 2021-04-02 DIAGNOSIS — K59.00 CONSTIPATION, UNSPECIFIED CONSTIPATION TYPE: ICD-10-CM

## 2021-04-02 LAB
ALBUMIN SERPL-MCNC: 3.6 G/DL (ref 3.5–5)
ALBUMIN/GLOB SERPL: 1.1 {RATIO} (ref 1.1–2.2)
ALP SERPL-CCNC: 136 U/L (ref 45–117)
ALT SERPL-CCNC: 31 U/L (ref 12–78)
ANION GAP SERPL CALC-SCNC: 7 MMOL/L (ref 5–15)
AST SERPL-CCNC: 24 U/L (ref 15–37)
ATRIAL RATE: 52 BPM
BASOPHILS # BLD: 0 K/UL (ref 0–0.1)
BASOPHILS NFR BLD: 0 % (ref 0–1)
BILIRUB SERPL-MCNC: 0.5 MG/DL (ref 0.2–1)
BUN SERPL-MCNC: 21 MG/DL (ref 6–20)
BUN/CREAT SERPL: 21 (ref 12–20)
CALCIUM SERPL-MCNC: 8.3 MG/DL (ref 8.5–10.1)
CALCULATED R AXIS, ECG10: 51 DEGREES
CALCULATED T AXIS, ECG11: 40 DEGREES
CHLORIDE SERPL-SCNC: 104 MMOL/L (ref 97–108)
CO2 SERPL-SCNC: 31 MMOL/L (ref 21–32)
CREAT SERPL-MCNC: 1.01 MG/DL (ref 0.7–1.3)
DIAGNOSIS, 93000: NORMAL
DIFFERENTIAL METHOD BLD: NORMAL
EOSINOPHIL # BLD: 0.1 K/UL (ref 0–0.4)
EOSINOPHIL NFR BLD: 2 % (ref 0–7)
ERYTHROCYTE [DISTWIDTH] IN BLOOD BY AUTOMATED COUNT: 14.4 % (ref 11.5–14.5)
GLOBULIN SER CALC-MCNC: 3.4 G/DL (ref 2–4)
GLUCOSE SERPL-MCNC: 108 MG/DL (ref 65–100)
HCT VFR BLD AUTO: 41.1 % (ref 36.6–50.3)
HGB BLD-MCNC: 13.2 G/DL (ref 12.1–17)
IMM GRANULOCYTES # BLD AUTO: 0 K/UL (ref 0–0.04)
IMM GRANULOCYTES NFR BLD AUTO: 0 % (ref 0–0.5)
LYMPHOCYTES # BLD: 1.4 K/UL (ref 0.8–3.5)
LYMPHOCYTES NFR BLD: 19 % (ref 12–49)
MCH RBC QN AUTO: 31.6 PG (ref 26–34)
MCHC RBC AUTO-ENTMCNC: 32.1 G/DL (ref 30–36.5)
MCV RBC AUTO: 98.3 FL (ref 80–99)
MONOCYTES # BLD: 0.6 K/UL (ref 0–1)
MONOCYTES NFR BLD: 8 % (ref 5–13)
NEUTS SEG # BLD: 5.2 K/UL (ref 1.8–8)
NEUTS SEG NFR BLD: 71 % (ref 32–75)
NRBC # BLD: 0 K/UL (ref 0–0.01)
NRBC BLD-RTO: 0 PER 100 WBC
PLATELET # BLD AUTO: 168 K/UL (ref 150–400)
PMV BLD AUTO: 10.9 FL (ref 8.9–12.9)
POTASSIUM SERPL-SCNC: 4 MMOL/L (ref 3.5–5.1)
PROT SERPL-MCNC: 7 G/DL (ref 6.4–8.2)
Q-T INTERVAL, ECG07: 438 MS
QRS DURATION, ECG06: 86 MS
QTC CALCULATION (BEZET), ECG08: 407 MS
RBC # BLD AUTO: 4.18 M/UL (ref 4.1–5.7)
SODIUM SERPL-SCNC: 142 MMOL/L (ref 136–145)
TROPONIN I SERPL-MCNC: <0.05 NG/ML
UR CULT HOLD, URHOLD: NORMAL
VENTRICULAR RATE, ECG03: 52 BPM
WBC # BLD AUTO: 7.3 K/UL (ref 4.1–11.1)

## 2021-04-02 PROCEDURE — 74177 CT ABD & PELVIS W/CONTRAST: CPT

## 2021-04-02 PROCEDURE — 99285 EMERGENCY DEPT VISIT HI MDM: CPT

## 2021-04-02 PROCEDURE — 80053 COMPREHEN METABOLIC PANEL: CPT

## 2021-04-02 PROCEDURE — 74011000636 HC RX REV CODE- 636: Performed by: EMERGENCY MEDICINE

## 2021-04-02 PROCEDURE — 84484 ASSAY OF TROPONIN QUANT: CPT

## 2021-04-02 PROCEDURE — 85025 COMPLETE CBC W/AUTO DIFF WBC: CPT

## 2021-04-02 PROCEDURE — 93005 ELECTROCARDIOGRAM TRACING: CPT

## 2021-04-02 PROCEDURE — 71045 X-RAY EXAM CHEST 1 VIEW: CPT

## 2021-04-02 PROCEDURE — 81001 URINALYSIS AUTO W/SCOPE: CPT

## 2021-04-02 PROCEDURE — 71275 CT ANGIOGRAPHY CHEST: CPT

## 2021-04-02 PROCEDURE — 36415 COLL VENOUS BLD VENIPUNCTURE: CPT

## 2021-04-02 PROCEDURE — 77030019905 HC CATH URETH INTMIT MDII -A

## 2021-04-02 PROCEDURE — 74011250636 HC RX REV CODE- 250/636: Performed by: EMERGENCY MEDICINE

## 2021-04-02 RX ORDER — LORAZEPAM 1 MG/1
TABLET ORAL AS NEEDED
COMMUNITY

## 2021-04-02 RX ORDER — LIDOCAINE 4 G/100G
1 PATCH TOPICAL EVERY 24 HOURS
COMMUNITY

## 2021-04-02 RX ADMIN — SODIUM CHLORIDE 500 ML: 9 INJECTION, SOLUTION INTRAVENOUS at 15:43

## 2021-04-02 RX ADMIN — IOPAMIDOL 100 ML: 755 INJECTION, SOLUTION INTRAVENOUS at 15:29

## 2021-04-02 NOTE — ED NOTES
Patient brief saturated. Some redness noted to the buttock. Perineal care provided and patient straight cathed for urine. Patient tolerated it well. Placed in new brief and repositioned in bed. Daughter now at the bedside.

## 2021-04-02 NOTE — ED NOTES
FIDENCIO present in department. Provided with patient paperwork and discharge paperwork for facility. Patient changed into a fresh brief, paper scrub pants and his t-shirt. Patient wrapped in warm blankets. Belongings placed in bags and at bedside, transport aware. Patient transferred to EMS stretcher without any difficulty.

## 2021-04-02 NOTE — ED TRIAGE NOTES
Triage Note: Patient arrives to ER via EMS from [place] for a productive cough and chest pain. Per staff patient was holding his chest and grimacing. + productive cough. EMS reports stable vital signs en route. Patient at baseline mentation, oriented to self. +dementia.

## 2021-04-02 NOTE — ED PROVIDER NOTES
HPI     Please note that this dictation was completed with [employer], the computer voice recognition software. Quite often unanticipated grammatical, syntax, homophones, and other interpretive errors are inadvertently transcribed by the computer software. Please disregard these errors. Please excuse any errors that have escaped final proofreading. 69-year-old male with a history of sick sinus syndrome, pacemaker, hypothyroidism, hypertension, dementia transferred from [facility] after clutching towards chest today. Positive productive cough. History very limited due to patient's dementia. No reported fevers. Unable to obtain any history from the patient. He had Covid a few months ago and has since received the Covid vaccine. Social history: Resident of [facility]. . Past Medical History:   Diagnosis Date    Dementia ([facility] [address] [facility]     Hypertension     Hypothyroidism     Pacemaker     Pituitary tumor     Sick sinus syndrome ([facility]        Past Surgical History:   Procedure Laterality Date    HX PACEMAKER           No family history on file.     Social History     Socioeconomic History    Marital status:      Spouse name: Not on file    Number of children: Not on file    Years of education: Not on file    Highest education level: Not on file   Occupational History    Not on file   Social Needs    Financial resource strain: Not on file    Food insecurity     Worry: Not on file     Inability: Not on file    Transportation needs     Medical: Not on file     Non-medical: Not on file   Tobacco Use    Smoking status: Never Smoker    Smokeless tobacco: Never Used   Substance and Sexual Activity    Alcohol use: No     Frequency: Never    Drug use: No    Sexual activity: Not on file   Lifestyle    Physical activity     Days per week: Not on file     Minutes per session: Not on file    Stress: Not on file   Relationships    Social connections     Talks on phone: Not on file Gets together: Not on file     Attends Sikhism service: Not on file     Active member of club or organization: Not on file     Attends meetings of clubs or organizations: Not on file     Relationship status: Not on file    Intimate partner violence     Fear of current or ex partner: Not on file     Emotionally abused: Not on file     Physically abused: Not on file     Forced sexual activity: Not on file   Other Topics Concern    Not on file   Social History Narrative    Not on file         ALLERGIES: Exelon [rivastigmine]    Review of Systems   Unable to perform ROS: Dementia       There were no vitals filed for this visit. Physical Exam  Vitals signs and nursing note reviewed. Constitutional:       Appearance: He is well-developed. Comments: elderly   HENT:      Head: Normocephalic and atraumatic. Mouth/Throat:      Pharynx: No oropharyngeal exudate. Eyes:      General: No scleral icterus. Right eye: No discharge. Left eye: No discharge. Conjunctiva/sclera: Conjunctivae normal.      Pupils: Pupils are equal, round, and reactive to light. Neck:      Musculoskeletal: Normal range of motion and neck supple. Cardiovascular:      Rate and Rhythm: Normal rate and regular rhythm. Heart sounds: Normal heart sounds. No murmur. No friction rub. No gallop. Pulmonary:      Effort: Pulmonary effort is normal. No respiratory distress. Breath sounds: No wheezing or rales. Comments: Upper airay congested like cough. Wet sounding cough. Abdominal:      General: Bowel sounds are normal. There is no distension. Palpations: Abdomen is soft. Tenderness: There is abdominal tenderness (mild diffuse). There is guarding (intermittent voluntary). Musculoskeletal: Normal range of motion. General: No tenderness. Comments: 1+ bilateral edema   Lymphadenopathy:      Cervical: No cervical adenopathy. Skin:     General: Skin is warm and dry. Coloration: Skin is not pale. Findings: No rash. Neurological:      Mental Status: He is alert. Comments: Knows name only. Moves arms and legs. [facility]  ED Course as of Apr 02 1627 Fri Apr 02, 2021   1514 Patient with difficult assessment as he appears to be guarding possibly on his abdominal exam and he certainly has a very congested cough. Will do CTA chest as well as abdomen pelvis. [RG]      ED Course User Index  [RG] Naz Ruiz MD     79year-old male here with cough, congestion and clutching at the chest area. Differential diagnosis is very broad. Will check rectal temp, labs, chest x-ray to start. It does appear that he has some abdominal guarding potentially but it intermittent. Procedures        ED EKG interpretation:  Rhythm: junctional; and regular . Rate (approx.): 52; Axis: normal; P wave: normal; QRS interval: normal ; ST/T wave: normal; . This EKG was interpreted by Adonis Swenson MD,ED Provider. 4:27 PM  UPDATED DAUGHTER AT BEDSIDE WITH AVAILABLE RESULTS. She agrees with not testing with another troponin at this point. 4:28 PM  Patient's results have been reviewed with them. Patient and/or family have verbally conveyed their understanding and agreement of the patient's signs, symptoms, diagnosis, treatment and prognosis and additionally agree to follow up as recommended or return to the Emergency Room should their condition change prior to follow-up. Discharge instructions have also been provided to the patient with some educational information regarding their diagnosis as well a list of reasons why they would want to return to the ER prior to their follow-up appointment should their condition change.     Recent Results (from the past 24 hour(s))   EKG, 12 LEAD, INITIAL    Collection Time: 04/02/21  1:58 PM   Result Value Ref Range    Ventricular Rate 52 BPM    Atrial Rate 52 BPM    QRS Duration 86 ms    Q-T Interval 438 ms    QTC Calculation (Bezet) 407 ms    Calculated R Axis 51 degrees    Calculated T Axis 40 degrees    Diagnosis       Junctional rhythm  Abnormal ECG  When compared with ECG of 15-NOV-2019 12:10,  Junctional rhythm has replaced Sinus rhythm  QT has shortened     METABOLIC PANEL, COMPREHENSIVE    Collection Time: 04/02/21  2:18 PM   Result Value Ref Range    Sodium 142 136 - 145 mmol/L    Potassium 4.0 3.5 - 5.1 mmol/L    Chloride 104 97 - 108 mmol/L    CO2 31 21 - 32 mmol/L    Anion gap 7 5 - 15 mmol/L    Glucose 108 (H) 65 - 100 mg/dL    BUN 21 (H) 6 - 20 MG/DL    Creatinine 1.01 0.70 - 1.30 MG/DL    BUN/Creatinine ratio 21 (H) 12 - 20      GFR est AA >60 >60 ml/min/1.73m2    GFR est non-AA >60 >60 ml/min/1.73m2    Calcium 8.3 (L) 8.5 - 10.1 MG/DL    Bilirubin, total 0.5 0.2 - 1.0 MG/DL    ALT (SGPT) 31 12 - 78 U/L    AST (SGOT) 24 15 - 37 U/L    Alk. phosphatase 136 (H) 45 - 117 U/L    Protein, total 7.0 6.4 - 8.2 g/dL    Albumin 3.6 3.5 - 5.0 g/dL    Globulin 3.4 2.0 - 4.0 g/dL    A-G Ratio 1.1 1.1 - 2.2     CBC WITH AUTOMATED DIFF    Collection Time: 04/02/21  2:18 PM   Result Value Ref Range    WBC 7.3 4.1 - 11.1 K/uL    RBC 4.18 4.10 - 5.70 M/uL    HGB 13.2 12.1 - 17.0 g/dL    HCT 41.1 36.6 - 50.3 %    MCV 98.3 80.0 - 99.0 FL    MCH 31.6 26.0 - 34.0 PG    MCHC 32.1 30.0 - 36.5 g/dL    RDW 14.4 11.5 - 14.5 %    PLATELET 545 901 - 689 K/uL    MPV 10.9 8.9 - 12.9 FL    NRBC 0.0 0  WBC    ABSOLUTE NRBC 0.00 0.00 - 0.01 K/uL    NEUTROPHILS 71 32 - 75 %    LYMPHOCYTES 19 12 - 49 %    MONOCYTES 8 5 - 13 %    EOSINOPHILS 2 0 - 7 %    BASOPHILS 0 0 - 1 %    IMMATURE GRANULOCYTES 0 0.0 - 0.5 %    ABS. NEUTROPHILS 5.2 1.8 - 8.0 K/UL    ABS. LYMPHOCYTES 1.4 0.8 - 3.5 K/UL    ABS. MONOCYTES 0.6 0.0 - 1.0 K/UL    ABS. EOSINOPHILS 0.1 0.0 - 0.4 K/UL    ABS. BASOPHILS 0.0 0.0 - 0.1 K/UL    ABS. IMM.  GRANS. 0.0 0.00 - 0.04 K/UL    DF AUTOMATED     TROPONIN I    Collection Time: 04/02/21  2:18 PM   Result Value Ref Range Troponin-I, Qt. <0.05 <0.05 ng/mL   URINALYSIS W/MICROSCOPIC    Collection Time: 04/02/21  2:18 PM   Result Value Ref Range    Color YELLOW/STRAW      Appearance CLEAR CLEAR      Specific gravity 1.031 (H) 1.003 - 1.030      pH (UA) 5.5 5.0 - 8.0      Protein Negative NEG mg/dL    Glucose Negative NEG mg/dL    Ketone Negative NEG mg/dL    Bilirubin Negative NEG      Blood Negative NEG      Urobilinogen 0.2 0.2 - 1.0 EU/dL    Nitrites Negative NEG      Leukocyte Esterase Negative NEG      WBC 0-4 0 - 4 /hpf    RBC 0-5 0 - 5 /hpf    Epithelial cells MODERATE (A) FEW /lpf    Bacteria Negative NEG /hpf    Mucus TRACE (A) NEG /lpf    Hyaline cast 0-2 0 - 5 /lpf   URINE CULTURE HOLD SAMPLE    Collection Time: 04/02/21  2:18 PM    Specimen: Serum; Urine   Result Value Ref Range    Urine culture hold        Urine on hold in Microbiology dept for 2 days. If unpreserved urine is submitted, it cannot be used for addtional testing after 24 hours, recollection will be required. Cta Chest W Or W Wo Cont    Result Date: 4/2/2021  EXAM: CTA CHEST W OR W WO CONT INDICATION: chest pain, congested cough COMPARISON: CT 9/26/2015. CONTRAST: 100 mL of Isovue-370. TECHNIQUE: Precontrast  images were obtained to localize the volume for acquisition. Multislice helical CT arteriography was performed from the diaphragm to the thoracic inlet during uneventful rapid bolus intravenous contrast administration. Lung and soft tissue windows were generated. Coronal and sagittal images were generated and 3D post processing consisting of coronal maximum intensity images was performed. CT dose reduction was achieved through use of a standardized protocol tailored for this examination and automatic exposure control for dose modulation. FINDINGS: A left subclavian pacemaker is in place. THYROID: No nodule. MEDIASTINUM: No mass or lymphadenopathy. WILLIAMS: No mass or lymphadenopathy. THORACIC AORTA: No dissection or aneurysm.  PULMONARY ARTERIES: Normal in caliber. The pulmonary arteries are well enhanced. No evidence of pulmonary embolus. TRACHEA/BRONCHI: Patent. ESOPHAGUS: No wall thickening or dilatation. HEART: Normal in size. PLEURA: No effusion or pneumothorax. LUNGS: No nodule, mass, or airspace disease. INCIDENTALLY IMAGED UPPER ABDOMEN: No focal abnormality. BONES: There is a chronic mild compression deformity of T1. There is a chronic moderate compression deformity of T2. There is a chronic minimal compression deformity of T3. There is a chronic moderate compression deformity of T5. There is chronic deformity of several right-sided ribs. Negative for pulmonary embolus. No evidence of acute process. .    Ct Abd Pelv W Cont    Result Date: 4/2/2021  EXAM: CT ABD PELV W CONT INDICATION: abd tenderness COMPARISON: Earlier same day CONTRAST: 100 mL of Isovue-370. TECHNIQUE: Following the uneventful intravenous administration of contrast, thin axial images were obtained through the abdomen and pelvis. Coronal and sagittal reconstructions were generated. Oral contrast was not administered. CT dose reduction was achieved through use of a standardized protocol tailored for this examination and automatic exposure control for dose modulation. FINDINGS: LOWER THORAX: Bibasilar atelectasis. Small hiatal hernia LIVER: No mass. BILIARY TREE: Gallbladder is within normal limits. CBD is not dilated. SPLEEN: within normal limits. PANCREAS: No mass or ductal dilatation. ADRENALS: Unremarkable. KIDNEYS: No mass, calculus, or hydronephrosis. STOMACH: Unremarkable. SMALL BOWEL: No dilatation or wall thickening. COLON: No dilatation or wall thickening. Prominent amount stool within the right colon APPENDIX: Not identified PERITONEUM: No ascites or pneumoperitoneum. RETROPERITONEUM: No lymphadenopathy or aortic aneurysm.  There are vascular calcifications REPRODUCTIVE ORGANS: Not enlarged URINARY BLADDER: Anterior bladder wall is mildly prominent but the bladder is incompletely distended. BONES: Bones are osteoporotic. Chronic fractures of the right superior and inferior pubic ramus. Chronic wedging of T11 ABDOMINAL WALL: No mass or hernia. ADDITIONAL COMMENTS: N/A     Prominent amount stool within the right colon otherwise no acute abnormality    Xr Chest Port    Result Date: 4/2/2021  INDICATION:  chest pain Exam: Portable chest 1436. Comparison: 11/15/2019. Findings: Cardiomediastinal silhouette is within normal limits. Pulmonary vasculature is not engorged. There are no focal parenchymal opacities, effusions, or pneumothorax. Lung volumes are decreased with minimal atelectasis. Left chest pacer partially obscured the left chest     1.  No acute disease

## 2021-04-02 NOTE — ED NOTES
SBAR report given to [facility] from [facility]. He is aware that patient is coming back to the facility. No further questions at this time.

## 2021-04-05 LAB
APPEARANCE UR: CLEAR
BACTERIA URNS QL MICRO: NEGATIVE /HPF
BILIRUB UR QL: NEGATIVE
COLOR UR: ABNORMAL
EPITH CASTS URNS QL MICRO: ABNORMAL /LPF
GLUCOSE UR STRIP.AUTO-MCNC: NEGATIVE MG/DL
HGB UR QL STRIP: NEGATIVE
HYALINE CASTS URNS QL MICRO: ABNORMAL /LPF (ref 0–5)
KETONES UR QL STRIP.AUTO: NEGATIVE MG/DL
LEUKOCYTE ESTERASE UR QL STRIP.AUTO: NEGATIVE
MUCOUS THREADS URNS QL MICRO: ABNORMAL /LPF
NITRITE UR QL STRIP.AUTO: NEGATIVE
PH UR STRIP: 5.5 [PH] (ref 5–8)
PROT UR STRIP-MCNC: NEGATIVE MG/DL
RBC #/AREA URNS HPF: ABNORMAL /HPF (ref 0–5)
SP GR UR REFRACTOMETRY: >1.03 (ref 1–1.03)
UROBILINOGEN UR QL STRIP.AUTO: 0.2 EU/DL (ref 0.2–1)
WBC URNS QL MICRO: ABNORMAL /HPF (ref 0–4)

## 2021-12-22 ENCOUNTER — APPOINTMENT (OUTPATIENT)
Dept: GENERAL RADIOLOGY | Age: 81
End: 2021-12-22
Attending: EMERGENCY MEDICINE
Payer: MEDICARE

## 2021-12-22 ENCOUNTER — HOSPITAL ENCOUNTER (EMERGENCY)
Age: 81
Discharge: NURSING, REHAB OR LONG-TERM CARE | End: 2021-12-22
Attending: EMERGENCY MEDICINE
Payer: MEDICARE

## 2021-12-22 VITALS
HEART RATE: 78 BPM | OXYGEN SATURATION: 98 % | DIASTOLIC BLOOD PRESSURE: 83 MMHG | SYSTOLIC BLOOD PRESSURE: 134 MMHG | RESPIRATION RATE: 13 BRPM

## 2021-12-22 DIAGNOSIS — Z51.5 END OF LIFE CARE: ICD-10-CM

## 2021-12-22 DIAGNOSIS — F03.90 DEMENTIA WITHOUT BEHAVIORAL DISTURBANCE, UNSPECIFIED DEMENTIA TYPE: Primary | ICD-10-CM

## 2021-12-22 DIAGNOSIS — J90 PLEURAL EFFUSION: ICD-10-CM

## 2021-12-22 LAB
ALBUMIN SERPL-MCNC: 3.6 G/DL (ref 3.5–5)
ALBUMIN/GLOB SERPL: 1 {RATIO} (ref 1.1–2.2)
ALP SERPL-CCNC: 107 U/L (ref 45–117)
ALT SERPL-CCNC: 27 U/L (ref 12–78)
ANION GAP SERPL CALC-SCNC: 2 MMOL/L (ref 5–15)
AST SERPL-CCNC: 22 U/L (ref 15–37)
BASOPHILS # BLD: 0 K/UL (ref 0–0.1)
BASOPHILS NFR BLD: 0 % (ref 0–1)
BILIRUB SERPL-MCNC: 0.7 MG/DL (ref 0.2–1)
BUN SERPL-MCNC: 14 MG/DL (ref 6–20)
BUN/CREAT SERPL: 15 (ref 12–20)
CALCIUM SERPL-MCNC: 8.9 MG/DL (ref 8.5–10.1)
CHLORIDE SERPL-SCNC: 106 MMOL/L (ref 97–108)
CO2 SERPL-SCNC: 28 MMOL/L (ref 21–32)
COVID-19 RAPID TEST, COVR: NOT DETECTED
CREAT SERPL-MCNC: 0.96 MG/DL (ref 0.7–1.3)
DIFFERENTIAL METHOD BLD: NORMAL
EOSINOPHIL # BLD: 0.1 K/UL (ref 0–0.4)
EOSINOPHIL NFR BLD: 1 % (ref 0–7)
ERYTHROCYTE [DISTWIDTH] IN BLOOD BY AUTOMATED COUNT: 14.1 % (ref 11.5–14.5)
GLOBULIN SER CALC-MCNC: 3.7 G/DL (ref 2–4)
GLUCOSE SERPL-MCNC: 99 MG/DL (ref 65–100)
HCT VFR BLD AUTO: 42.9 % (ref 36.6–50.3)
HGB BLD-MCNC: 14.2 G/DL (ref 12.1–17)
IMM GRANULOCYTES # BLD AUTO: 0 K/UL (ref 0–0.04)
IMM GRANULOCYTES NFR BLD AUTO: 0 % (ref 0–0.5)
LYMPHOCYTES # BLD: 1.6 K/UL (ref 0.8–3.5)
LYMPHOCYTES NFR BLD: 23 % (ref 12–49)
MAGNESIUM SERPL-MCNC: 2.1 MG/DL (ref 1.6–2.4)
MCH RBC QN AUTO: 30.9 PG (ref 26–34)
MCHC RBC AUTO-ENTMCNC: 33.1 G/DL (ref 30–36.5)
MCV RBC AUTO: 93.5 FL (ref 80–99)
MONOCYTES # BLD: 0.6 K/UL (ref 0–1)
MONOCYTES NFR BLD: 10 % (ref 5–13)
NEUTS SEG # BLD: 4.4 K/UL (ref 1.8–8)
NEUTS SEG NFR BLD: 66 % (ref 32–75)
NRBC # BLD: 0 K/UL (ref 0–0.01)
NRBC BLD-RTO: 0 PER 100 WBC
PLATELET # BLD AUTO: 184 K/UL (ref 150–400)
PMV BLD AUTO: 10.9 FL (ref 8.9–12.9)
POTASSIUM SERPL-SCNC: 3.9 MMOL/L (ref 3.5–5.1)
PROT SERPL-MCNC: 7.3 G/DL (ref 6.4–8.2)
RBC # BLD AUTO: 4.59 M/UL (ref 4.1–5.7)
SODIUM SERPL-SCNC: 136 MMOL/L (ref 136–145)
SOURCE, COVRS: NORMAL
WBC # BLD AUTO: 6.7 K/UL (ref 4.1–11.1)

## 2021-12-22 PROCEDURE — 80053 COMPREHEN METABOLIC PANEL: CPT

## 2021-12-22 PROCEDURE — 87635 SARS-COV-2 COVID-19 AMP PRB: CPT

## 2021-12-22 PROCEDURE — 71045 X-RAY EXAM CHEST 1 VIEW: CPT

## 2021-12-22 PROCEDURE — 36415 COLL VENOUS BLD VENIPUNCTURE: CPT

## 2021-12-22 PROCEDURE — 85025 COMPLETE CBC W/AUTO DIFF WBC: CPT

## 2021-12-22 PROCEDURE — 74011250636 HC RX REV CODE- 250/636: Performed by: EMERGENCY MEDICINE

## 2021-12-22 PROCEDURE — 96374 THER/PROPH/DIAG INJ IV PUSH: CPT

## 2021-12-22 PROCEDURE — 83735 ASSAY OF MAGNESIUM: CPT

## 2021-12-22 PROCEDURE — 99285 EMERGENCY DEPT VISIT HI MDM: CPT

## 2021-12-22 PROCEDURE — 96376 TX/PRO/DX INJ SAME DRUG ADON: CPT

## 2021-12-22 RX ORDER — MORPHINE SULFATE 2 MG/ML
2 INJECTION, SOLUTION INTRAMUSCULAR; INTRAVENOUS
Status: DISCONTINUED | OUTPATIENT
Start: 2021-12-22 | End: 2021-12-22 | Stop reason: HOSPADM

## 2021-12-22 RX ORDER — MORPHINE SULFATE 2 MG/ML
2 INJECTION, SOLUTION INTRAMUSCULAR; INTRAVENOUS
Status: COMPLETED | OUTPATIENT
Start: 2021-12-22 | End: 2021-12-22

## 2021-12-22 RX ADMIN — MORPHINE SULFATE 2 MG: 2 INJECTION, SOLUTION INTRAMUSCULAR; INTRAVENOUS at 16:38

## 2021-12-22 RX ADMIN — MORPHINE SULFATE 2 MG: 2 INJECTION, SOLUTION INTRAMUSCULAR; INTRAVENOUS at 15:11

## 2021-12-22 RX ADMIN — MORPHINE SULFATE 2 MG: 2 INJECTION, SOLUTION INTRAMUSCULAR; INTRAVENOUS at 12:26

## 2021-12-22 NOTE — ED TRIAGE NOTES
Pt to ED from SNF for SOB r/t pulmonary effusion. Pt was found by SNF staff to have SpO2 of 77% on RA. EMS placed pt on NRB at 10L, pt now has SpO2 of 100%. Pt has advanced dementia and is non-verbal. Daughter, who is medical decision-maker, is at bedside.

## 2021-12-22 NOTE — ED PROVIDER NOTES
Patient is an 11-year-old male with past medical history significant for dementia, hypertension, sick sinus syndrome status post pacemaker presents emergency department via EMS for evaluation of increased work of breathing and hypoxia. Daughter at bedside is in the medical field and is power of . She states that she is DNR/DNI and request evaluation by hospice. Does not want any invasive procedures done. She states that they had gotten a chest x-ray at the facility and found that he had a large pleural effusion however would like to defer thoracentesis due to his chronically debilitated state. She would like to pursue comfort measures. Patient unable to contribute to his history due to his chronic dementia. Past Medical History:   Diagnosis Date    Dementia ([facility] [address]     Hypertension     Hypothyroidism     Pacemaker     Pituitary tumor     Sick sinus syndrome ([facility] [address]        Past Surgical History:   Procedure Laterality Date    HX PACEMAKER           No family history on file. Social History     Socioeconomic History    Marital status:      Spouse name: Not on file    Number of children: Not on file    Years of education: Not on file    Highest education level: Not on file   Occupational History    Not on file   Tobacco Use    Smoking status: Never Smoker    Smokeless tobacco: Never Used   Substance and Sexual Activity    Alcohol use: No    Drug use: No    Sexual activity: Not on file   Other Topics Concern    Not on file   Social History Narrative    Not on file     Social Determinants of Health     Financial Resource Strain:     Difficulty of Paying Living Expenses: Not on file   Food Insecurity:     Worried About Running Out of Food in the Last Year: Not on file    Benito of Food in the Last Year: Not on file   Transportation Needs:     Lack of Transportation (Medical): Not on file    Lack of Transportation (Non-Medical):  Not on file   Physical Activity:     Days of Exercise per Week: Not on file    Minutes of Exercise per Session: Not on file   Stress:     Feeling of Stress : Not on file   Social Connections:     Frequency of Communication with Friends and Family: Not on file    Frequency of Social Gatherings with Friends and Family: Not on file    Attends Zoroastrian Services: Not on file    Active Member of [address] [place] [address] or Organizations: Not on file    Attends Club or Organization Meetings: Not on file    Marital Status: Not on file   Intimate Partner Violence:     Fear of Current or Ex-Partner: Not on file    Emotionally Abused: Not on file    Physically Abused: Not on file    Sexually Abused: Not on file   Housing Stability:     Unable to Pay for Housing in the Last Year: Not on file    Number of [place] in the Last Year: Not on file    Unstable Housing in the Last Year: Not on file         ALLERGIES: Exelon [rivastigmine]    Review of Systems   Unable to perform ROS: Dementia       Vitals:    12/22/21 1320 12/22/21 1401 12/22/21 1406 12/22/21 1406   BP:  (!) 161/125     Pulse: 75 62 64    Resp: 17 17 12    SpO2:    98%            Physical Exam  Vitals and nursing note reviewed. Constitutional:       Appearance: He is not toxic-appearing or diaphoretic. Comments: Elderly, deconditioned   HENT:      Head: Atraumatic. Neck:      Trachea: No tracheal deviation. Cardiovascular:      Rate and Rhythm: Normal rate. Heart sounds: No friction rub. Pulmonary:      Effort: Tachypnea present. Breath sounds: No stridor. Examination of the right-upper field reveals decreased breath sounds. Examination of the right-middle field reveals decreased breath sounds. Examination of the right-lower field reveals decreased breath sounds. Decreased breath sounds and rhonchi present. Abdominal:      Palpations: Abdomen is soft. Tenderness: There is no guarding. Skin:     General: Skin is warm and dry.    Neurological:      Mental Status: He is alert.          MDM  Number of Diagnoses or Management Options  Dementia without behavioral disturbance, unspecified dementia type ([facility] [address]): established and worsening  End of life care  Pleural effusion  Diagnosis management comments: Daughter advised of x-ray findings to include consolidation. Would like to forego antibiotics or any medications to prolong his life. Would like to make comfort measures. We will write for low-dose of morphine to help with air hunger and discomfort. Patient seen by  who is involving hospice. Currently he cannot be seen by hospice team in the ER however they report that they will see him later today and write for any medications he may need from that perspective. Amount and/or Complexity of Data Reviewed  Clinical lab tests: reviewed and ordered  Tests in the radiology section of CPT®: reviewed and ordered  Discuss the patient with other providers: yes           Procedures      3:01 PM  Change of shift. Care of patient signed over to [facility]. Bedside handoff complete. Awaiting disposition from  regarding either discharge home to have hospice care versus staying overnight for hospice to set up plan of care. Kishore Marr

## 2021-12-22 NOTE — PROGRESS NOTES
Care Management:    Transition of Care Plan for ED patient:       · Disposition: return to [facility] ([phone])  · [facility] ([phone]) will open him tonight. · Hospice to provide home oxygen (Currently at 4L). Oxygen to be delivered between 5:00 PM and 5:30 PM.   · Transportation: [facility] ([phone]) at 18:30       Received consult to arrange hospice for patient. Spoke with patient's daughter - Elsy Luzt who is a RN with [facility]. Patient resides at [facility]. She spoke with them and they said patient could return to facility if he has hospice. They do have a preferred provider, but she does not know name of agency. She is okay with any agency facility recommends. Called [facility] ([phone]) and spoke with Yuliana Vance LPN. The address is  [address]. Patient resides in [address] [place] [address]. They use [facility] primarily, but family can select any agency. Patient resides in memory care with a diagnosis of Alzheimers Dementia. He does not currently have oxygen or a hospital bed. He is a 2 person transfer to his wheelchair. He eats finger foods. They occasionally have to feed him. He has not been eating well lately. He is incontinent. He is essentially full care. Patient was seen by his home health agency yesterday. He had a low grade fever and SATs were 93-94%. He had a CXR last night around 9:30 PM. The results this morning showed a pleural effusion and she could not get a ready for his SATs as he was very restless and hands were cold. They called ambulance. He has a history of bronchitis on more than one occasion. Called [facility] ([phone]) and spoke with Jazmine Montana in intake. They can accept patient today. Explained patient will need oxygen and possibly a hospital bed. Sent referral via [employer]. She will call daughter. Updated daughter. 11:40 AM Called multiple ambulance companies.  Earliest is [facility] for between 6:00 PM to 7:00 PM. Spoke with Neeraj. Patient is set for 6:30 PM . They will call if they are able to  any sooner. Faxed face sheet to [facility] at [phone]. Updated patient's [place] [facility] in person and Melissa with hospice via [employer]. MARY ANNE Morse    14:25 Called  [facility] to ensure oxygen would be delivered before patient arrives. Spoke with Cuong Espinosa with  [facility]. She said facility told them that they cannot accept a patient on a non-rebreather. Called [facility] and spoke with Jason Perez. She confirmed they have never managed a non-rebreather before. She will message her director of nursing to find out what the highest liter flow is they are able to accept. Spoke with  [facility] Valdemar Portillo, ). The highest liter flow the concentrator can make is 10 liters, but it cannot handle a non-rebreather. If his oxygen needs increase, they will manage his breathing with morphine. They will handle the orders for all of this. They can have his medications delivered to the facility. They will also leave a comfort kit with the facility. They will leave orders for facility to call them if patient has increasing difficulty breathing and not 911. They checked with their [address]. The oxygen will arrive between 5:00 PM and 5:30 PM.     Updated Jason Perez with [facility]. They will be ready for him. Reiterated that daughter does not want patient to return to hospital. Hospice will manage any increasing shortness of breath.      MARY ANNE Morse

## 2022-03-19 PROBLEM — Z87.81 S/P RIGHT HIP FRACTURE: Status: ACTIVE | Noted: 2018-12-30

## 2023-05-21 RX ORDER — AMLODIPINE BESYLATE 5 MG/1
5 TABLET ORAL DAILY
COMMUNITY
Start: 2019-01-09

## 2023-05-21 RX ORDER — TRAMADOL HYDROCHLORIDE 50 MG/1
50 TABLET ORAL EVERY 6 HOURS PRN
COMMUNITY
Start: 2019-01-08

## 2023-05-21 RX ORDER — LORAZEPAM 1 MG/1
TABLET ORAL PRN
COMMUNITY

## 2023-05-21 RX ORDER — QUETIAPINE FUMARATE 25 MG/1
50 TABLET, FILM COATED ORAL 2 TIMES DAILY
COMMUNITY

## 2023-05-21 RX ORDER — AMOXICILLIN 250 MG
1 CAPSULE ORAL 2 TIMES DAILY
COMMUNITY
Start: 2019-01-08

## 2023-05-21 RX ORDER — CETIRIZINE HYDROCHLORIDE 10 MG/1
10 TABLET ORAL
COMMUNITY

## 2023-05-21 RX ORDER — ATORVASTATIN CALCIUM 20 MG/1
20 TABLET, FILM COATED ORAL DAILY
COMMUNITY
Start: 2019-01-08

## 2023-05-21 RX ORDER — ACETAMINOPHEN 325 MG/1
650 TABLET ORAL EVERY 4 HOURS PRN
COMMUNITY
Start: 2019-01-08

## 2023-05-21 RX ORDER — LIDOCAINE 4 G/G
1 PATCH TOPICAL EVERY 24 HOURS
COMMUNITY

## 2023-05-21 RX ORDER — SERTRALINE HYDROCHLORIDE 100 MG/1
100 TABLET, FILM COATED ORAL DAILY
COMMUNITY

## 2023-05-21 RX ORDER — OMEPRAZOLE 40 MG/1
40 CAPSULE, DELAYED RELEASE ORAL 2 TIMES DAILY
COMMUNITY
Start: 2019-01-08

## 2023-05-21 RX ORDER — POLYETHYLENE GLYCOL 3350 17 G/17G
17 POWDER, FOR SOLUTION ORAL DAILY PRN
COMMUNITY
Start: 2019-01-08

## 2023-05-21 RX ORDER — POLYMYXIN B SULFATE AND TRIMETHOPRIM 1; 10000 MG/ML; [USP'U]/ML
1 SOLUTION OPHTHALMIC EVERY 4 HOURS
COMMUNITY
Start: 2019-11-15

## 2023-05-21 RX ORDER — TAMSULOSIN HYDROCHLORIDE 0.4 MG/1
0.4 CAPSULE ORAL DAILY
COMMUNITY

## 2023-05-21 RX ORDER — B-COMPLEX WITH VITAMIN C
1 TABLET ORAL
COMMUNITY
Start: 2019-01-08

## 2023-05-21 RX ORDER — MEMANTINE HYDROCHLORIDE 28 MG/1
28 CAPSULE, EXTENDED RELEASE ORAL DAILY
COMMUNITY

## 2023-05-21 RX ORDER — DONEPEZIL HYDROCHLORIDE 10 MG/1
10 TABLET, FILM COATED ORAL DAILY
COMMUNITY

## 2023-05-21 RX ORDER — LEVOTHYROXINE SODIUM 0.15 MG/1
125 TABLET ORAL
COMMUNITY

## 2024-02-10 NOTE — PROGRESS NOTES
Problem: Falls - Risk of 
Goal: *Absence of Falls Document Astrid Viera Fall Risk and appropriate interventions in the flowsheet. Outcome: Progressing Towards Goal 
Fall Risk Interventions: 
Mobility Interventions: Patient to call before getting OOB, PT Consult for mobility concerns Mentation Interventions: Adequate sleep, hydration, pain control Medication Interventions: Bed/chair exit alarm Elimination Interventions: Toileting schedule/hourly rounds History of Falls Interventions: Door open when patient unattended Problem: Pressure Injury - Risk of 
Goal: *Prevention of pressure injury Document Jules Scale and appropriate interventions in the flowsheet. Outcome: Progressing Towards Goal 
Pressure Injury Interventions: 
Sensory Interventions: Assess changes in LOC Moisture Interventions: Absorbent underpads Activity Interventions: Pressure redistribution bed/mattress(bed type) Mobility Interventions: Assess need for specialty bed, HOB 30 degrees or less Nutrition Interventions: Document food/fluid/supplement intake Friction and Shear Interventions: Lift sheet IV and/or equipment tethered to patient

## (undated) DEVICE — STERILE POLYISOPRENE POWDER-FREE SURGICAL GLOVES WITH EMOLLIENT COATING: Brand: PROTEXIS

## (undated) DEVICE — INFECTION CONTROL KIT SYS

## (undated) DEVICE — SUTURE VCRL SZ 2-0 L36IN ABSRB UD L36MM CT-1 1/2 CIR J945H

## (undated) DEVICE — HANDPIECE SET WITH BONE CLEANING TIP AND SUCTION TUBE: Brand: INTERPULSE

## (undated) DEVICE — REM POLYHESIVE ADULT PATIENT RETURN ELECTRODE: Brand: VALLEYLAB

## (undated) DEVICE — BLADE SURG SAW SAG S STL 85MM LEN 25.4MM W 1.19MM THCK

## (undated) DEVICE — SUT ETHLN 3-0 18IN PS1 BLK --

## (undated) DEVICE — SUTURE VCRL SZ 0 L27IN ABSRB UD L36MM CT-1 1/2 CIR J260H

## (undated) DEVICE — PAD 05IN BASE 3IN PEAK M DENS CONVOLUTED FOAM

## (undated) DEVICE — SOLUTION IV 1000ML 0.9% SOD CHL

## (undated) DEVICE — 3000CC GUARDIAN II: Brand: GUARDIAN

## (undated) DEVICE — SCRUB DRY SURG EZ SCRUB BRUSH PREOPERATIVE GRN

## (undated) DEVICE — SOLUTION IRRIG 3000ML 0.9% SOD CHL FLX CONT 0797208] ICU MEDICAL INC]

## (undated) DEVICE — ABDUCTION PILLOW FOAM POSITIONER: Brand: CARDINAL HEALTH

## (undated) DEVICE — STERILE POLYISOPRENE POWDER-FREE SURGICAL GLOVES: Brand: PROTEXIS

## (undated) DEVICE — 3M™ IOBAN™ 2 ANTIMICROBIAL INCISE DRAPE 6651EZ: Brand: IOBAN™ 2

## (undated) DEVICE — Device

## (undated) DEVICE — SLIM BODY SKIN STAPLER: Brand: APPOSE ULC

## (undated) DEVICE — SOLUTION IRRIG 1000ML H2O STRL BLT

## (undated) DEVICE — DRSG AQUACEL SURG 3.5 X 10IN -- CONVERT TO ITEM 370183

## (undated) DEVICE — DEVON™ KNEE AND BODY STRAP 60" X 3" (1.5 M X 7.6 CM): Brand: DEVON

## (undated) DEVICE — PREP SKN PREVAIL 40ML APPL --

## (undated) DEVICE — SUTURE MCRYL SZ 3-0 L27IN ABSRB UD L19MM PS-2 3/8 CIR PRIM Y427H